# Patient Record
Sex: MALE | Race: WHITE | Employment: OTHER | URBAN - METROPOLITAN AREA
[De-identification: names, ages, dates, MRNs, and addresses within clinical notes are randomized per-mention and may not be internally consistent; named-entity substitution may affect disease eponyms.]

---

## 2023-01-09 NOTE — PROGRESS NOTES
[address]   Cardiac Consultation    Referring Provider:  Jack Richards MD     Chief Complaint   Patient presents with    New Patient    Coronary Artery Disease    Hypertension    Other     No new concerns        History of Present Illness:  Renato Keller 1940 is a previous patient of Dr. Carey Sibley and last seen on 10/10/2016. Referred from Dr. Adrianne Rosales. He has a PMH significant for CAD s/p CABG 2005 (cath 2009 noted Patent LIMA to mid-LAD, Patent SVG to RPDA, Patent SVG to RPL1, Patent SVG to OM1 in 2005), HTN, HLD, DM II, aortic stenosis, CHF, angina pectoris and bilateral Carotid stenosis  Stress Echo 7/9/2012 noted asymptomatic + ROXANA for ischemia; Negative stress echo. Lina-Myoview 6/24/2013 EF=49%; small fixed defect in the inferior wall consistent with old infarct. Carotid Duplex 8/19/2015 noted <50% in the right and left external carotid artery. He presents today with 4-pronged cane. He walks stooped over slightly. He reports that he feels good. He saw cards doc in [place] but does not recall name or place? No records to review. He describes likely AS and consideration for TAVR but was told he didn't need it. Vague historian. He has been losing weight over the past 4-5 years. He notes that he is deconditioned and feels a little weak. He notes that that his BP is usually high. He just moved back from living in [place] for 6 yrs. He notes that he previously Dr. Veto Olea when he lived in [place]. EKG today NSR 64; prolonged 1st degree AVB; anterior infarct; IVCD; ST abnl lateral leads consider ischemia. Patient with no complaints of chest pain, SOB, palpitations, dizziness, edema, or orthopnea/PND. Past Medical History:   has a past medical history of CAD (coronary artery disease), Diabetes mellitus ([address] Hypertension, and Unspecified cerebral artery occlusion with cerebral infarction.     Surgical History:   has a past surgical history that includes Coronary angioplasty with stent; Cardiac surgery (2002); hernia repair; and eye surgery (Left, 11/17/2016). Social History:   reports that he has never smoked. He has never used smokeless tobacco. He reports current alcohol use. He reports that he does not use drugs. He is single in lives in [facility]. He has a daughter lives in [place]. Never smoked or did drugs. Family History:  family history includes Diabetes in his mother. Father passed from breathing issues worked in mines  Mother passed from DM 1      Home Medications:  Prior to Admission medications    Medication Sig Start Date End Date Taking? Authorizing Provider   amLODIPine (NORVASC) 5 MG tablet TAKE 1 TABLET BY MOUTH TWICE DAILY 1/3/23  Yes Historical Provider, MD   atorvastatin (LIPITOR) 10 MG tablet TAKE 1 TABLET BY MOUTH ONCE DAILY 1/3/23  Yes Historical Provider, MD   carvedilol (COREG) 3.125 MG tablet TAKE 1 TABLET BY MOUTH TWICE DAILY WITH FOOD 1/3/23  Yes Historical Provider, MD   JANUVIA 25 MG tablet  1/4/23  Yes Historical Provider, MD   aspirin 325 MG tablet Take 325 mg by mouth daily   Yes Historical Provider, MD   zinc gluconate 50 MG tablet Take 50 mg by mouth daily   Yes Historical Provider, MD   quiNINE Sulfate POWD by Does not apply route   Yes Historical Provider, MD   Multiple Vitamins-Minerals (THERAPEUTIC MULTIVITAMIN-MINERALS) tablet Take 1 tablet by mouth daily   Yes Historical Provider, MD        Allergies:  Isosorbide nitrate and Norvasc [amlodipine]     Review of Systems:   Constitutional: there has been no unanticipated weight loss. There's been no change in energy level, sleep pattern, or activity level. Eyes: No visual changes or diplopia. No scleral icterus. ENT: No Headaches, hearing loss or vertigo. No mouth sores or sore throat. Cardiovascular: Reviewed in HPI  Respiratory: No cough or wheezing, no sputum production. No hematemesis. Gastrointestinal: No abdominal pain, appetite loss, blood in stools.  No change in bowel or bladder habits.  Genitourinary: No dysuria, trouble voiding, or hematuria.  Musculoskeletal:  No gait disturbance, weakness or joint complaints.  Integumentary: No rash or pruritis.  Neurological: No headache, diplopia, change in muscle strength, numbness or tingling. No change in gait, balance, coordination, mood, affect, memory, mentation, behavior.  Psychiatric: No anxiety, no depression.  Endocrine: No malaise, fatigue or temperature intolerance. No excessive thirst, fluid intake, or urination. No tremor.  Hematologic/Lymphatic: No abnormal bruising or bleeding, blood clots or swollen lymph nodes.  Allergic/Immunologic: No nasal congestion or hives.      Physical Examination:    Vitals:    01/12/23 1254   BP: (!) 167/70   Pulse: 74   Temp: 98.6 °F (37 °C)   SpO2: 93%        Constitutional and General Appearance: NAD   Respiratory:  Normal excursion and expansion without use of accessory muscles  Resp Auscultation: Normal breath sounds without dullness Soft breatht sounds over the left base.  Cardiovascular:  The apical impulses not displaced  Heart tones are crisp and normal  Cervical veins are not engorged  The carotid upstroke is normal in amplitude and contour without delay or bruit  Normal S1S2, No S3, 2/6 PATI over left sternal border that radiates into carotids.   Peripheral pulses are symmetrical and full  There is no clubbing, cyanosis of the extremities.  No edema  Femoral Arteries: 2+ and equal  Pedal Pulses: 2+ and equal   Abdomen:  No masses or tenderness  Liver/Spleen: No Abnormalities Noted  Neurological/Psychiatric:  Alert and oriented in all spheres  Moves all extremities well  Exhibits normal gait balance and coordination  No abnormalities of mood, affect, memory, mentation, or behavior are noted  Skin:  Skin: warm and dry.    LABS:    Lab Results   Component Value Date     (L) 03/10/2016    K 3.6 03/10/2016    CL 91 (L) 03/10/2016    CO2 29 03/10/2016    BUN 18 03/10/2016    CREATININE 1.2  03/10/2016    GLUCOSE 119 (H) 03/10/2016    CALCIUM 9.1 03/10/2016    PROT 6.5 03/10/2016    LABALBU 4.3 03/10/2016    BILITOT 0.6 03/10/2016    ALKPHOS 77 03/10/2016    AST 22 03/10/2016    ALT 18 03/10/2016    LABGLOM 59 (A) 03/10/2016    GFRAA >60 03/10/2016    AGRATIO 2.0 03/10/2016    GLOB 2.2 03/10/2016     Lab Results   Component Value Date    WBC 8.0 03/10/2016    HGB 11.8 (L) 03/10/2016    HCT 34.5 (L) 03/10/2016    MCV 91.7 03/10/2016     03/10/2016     No results found for: TSH, L0VSQPL, N5AXHUF, THYROIDAB, FT3, T4FREE  No results found for: CHOL  No results found for: TRIG  No results found for: HDL  No results found for: LDLCHOLESTEROL, LDLCALC  No results found for: LABVLDL, VLDL  No results found for: CHOLHDLRATIO  No results found for: LABA1C      Assessment:         1. Primary hypertension: Suboptimal blood pressure control and will need adjustment of antihypertensive medical regimen. Will add lisinopril 10mg qd (on 5mg prior in 2016) and continue norvasc 5mg BID, coreg 3.125mg BID,. 2. Coronary artery disease involving native coronary artery of native heart, unspecified whether angina present: S/P remote CABG and cath in 2009 with patent grafts. There are no concerning symptoms for angina currently. Continue adult aspirin (he does not want baby dose) and lipitor 10mg daily. 3. Murmur, cardiac: Likely aortic stenosis and he reports what sounds like possible TAVR consideration but ultimately told he did not need it. No concerning symptoms at this time. Reports no testing for 2-3 years. Will check ECHO. 4.     Lipids: Need to check FLP near future and adjust PRN. Continue lipitor 10mg daily for now. Plan:  Start lisinopril 10 mg daily  Check fasting labs in 1 week at [facility]  I recommend ECHO to evaluate LV function and size, wall motion, and valves for any structural abnormalities.      Plan to follow up 4 months    This note was scribed in the presence of Mendoza Archer MD by Karen Philip RN. I, Dr. Shivam Romero, personally performed the services described in this documentation, as scribed by the above signed scribe in my presence. It is both accurate and complete to my knowledge. I agree with the details independently gathered by the clinical support staff, while the remaining scribed note accurately describes my personal service to the patient. Cost of prescription medications and patient compliance have been reviewed with patient. All questions answered. Thank you for allowing me to participate in the care of this individual.    Bhavin Martínez.  Serene Eldridge M.D., [facility]

## 2023-01-12 ENCOUNTER — OFFICE VISIT (OUTPATIENT)
Dept: CARDIOLOGY CLINIC | Age: 83
End: 2023-01-12

## 2023-01-12 VITALS
SYSTOLIC BLOOD PRESSURE: 167 MMHG | DIASTOLIC BLOOD PRESSURE: 70 MMHG | HEIGHT: 62 IN | OXYGEN SATURATION: 93 % | TEMPERATURE: 98.6 F | BODY MASS INDEX: 28.34 KG/M2 | HEART RATE: 74 BPM | WEIGHT: 154 LBS

## 2023-01-12 DIAGNOSIS — I25.10 CORONARY ARTERY DISEASE INVOLVING NATIVE CORONARY ARTERY OF NATIVE HEART, UNSPECIFIED WHETHER ANGINA PRESENT: ICD-10-CM

## 2023-01-12 DIAGNOSIS — I25.10 CORONARY ARTERY DISEASE INVOLVING NATIVE HEART WITHOUT ANGINA PECTORIS, UNSPECIFIED VESSEL OR LESION TYPE: Primary | ICD-10-CM

## 2023-01-12 DIAGNOSIS — R01.1 MURMUR, CARDIAC: ICD-10-CM

## 2023-01-12 DIAGNOSIS — R01.1 CARDIAC MURMUR: ICD-10-CM

## 2023-01-12 DIAGNOSIS — I10 PRIMARY HYPERTENSION: ICD-10-CM

## 2023-01-12 RX ORDER — ASPIRIN 325 MG
325 TABLET ORAL DAILY
COMMUNITY

## 2023-01-12 RX ORDER — AMLODIPINE BESYLATE 5 MG/1
TABLET ORAL
COMMUNITY
Start: 2023-01-03

## 2023-01-12 RX ORDER — M-VIT,TX,IRON,MINS/CALC/FOLIC 27MG-0.4MG
1 TABLET ORAL DAILY
COMMUNITY

## 2023-01-12 RX ORDER — SITAGLIPTIN 25 MG/1
TABLET, FILM COATED ORAL
COMMUNITY
Start: 2023-01-04

## 2023-01-12 RX ORDER — LISINOPRIL 10 MG/1
10 TABLET ORAL DAILY
Qty: 30 TABLET | Refills: 5 | Status: SHIPPED | OUTPATIENT
Start: 2023-01-12

## 2023-01-12 RX ORDER — CARVEDILOL 3.12 MG/1
TABLET ORAL
COMMUNITY
Start: 2023-01-03

## 2023-01-12 RX ORDER — ATORVASTATIN CALCIUM 10 MG/1
TABLET, FILM COATED ORAL
COMMUNITY
Start: 2023-01-03

## 2023-01-12 RX ORDER — ZINC GLUCONATE 50 MG
50 TABLET ORAL DAILY
COMMUNITY

## 2023-01-12 NOTE — PATIENT INSTRUCTIONS
Plan:  Start lisinopril 10 mg daily  Check fasting labs in 1 week at [facility]  I recommend ECHO to evaluate LV function and size, wall motion, and valves for any structural abnormalities. Your provider has ordered testing for further evaluation. An order/prescription has been included in your paper work. To schedule outpatient testing, contact Central Scheduling by calling Shidonni ([phone]).         Plan to follow up 4 months

## 2023-01-24 ENCOUNTER — HOSPITAL ENCOUNTER (OUTPATIENT)
Age: 83
Discharge: HOME | End: 2023-01-24
Payer: MEDICARE

## 2023-01-24 ENCOUNTER — OFFICE VISIT (OUTPATIENT)
Dept: SURGERY | Age: 83
End: 2023-01-24
Payer: MEDICARE

## 2023-01-24 VITALS
WEIGHT: 160 LBS | BODY MASS INDEX: 29.44 KG/M2 | DIASTOLIC BLOOD PRESSURE: 76 MMHG | SYSTOLIC BLOOD PRESSURE: 130 MMHG | HEIGHT: 62 IN

## 2023-01-24 DIAGNOSIS — K64.4 RESIDUAL HEMORRHOIDAL SKIN TAGS: Primary | ICD-10-CM

## 2023-01-24 DIAGNOSIS — I25.10 CORONARY ARTERY DISEASE INVOLVING NATIVE HEART WITHOUT ANGINA PECTORIS, UNSPECIFIED VESSEL OR LESION TYPE: ICD-10-CM

## 2023-01-24 DIAGNOSIS — R01.1 MURMUR, CARDIAC: ICD-10-CM

## 2023-01-24 DIAGNOSIS — I10 PRIMARY HYPERTENSION: ICD-10-CM

## 2023-01-24 LAB
A/G RATIO: 1.8 (ref 1.1–2.2)
ALBUMIN SERPL-MCNC: 4.3 G/DL (ref 3.4–5)
ALP BLD-CCNC: 85 U/L (ref 40–129)
ALT SERPL-CCNC: 19 U/L (ref 10–40)
ANION GAP SERPL CALCULATED.3IONS-SCNC: 16 MMOL/L (ref 3–16)
AST SERPL-CCNC: 24 U/L (ref 15–37)
BASOPHILS ABSOLUTE: 0.1 K/UL (ref 0–0.2)
BASOPHILS RELATIVE PERCENT: 0.8 %
BILIRUB SERPL-MCNC: 0.6 MG/DL (ref 0–1)
BUN BLDV-MCNC: 31 MG/DL (ref 7–20)
CALCIUM SERPL-MCNC: 9.7 MG/DL (ref 8.3–10.6)
CHLORIDE BLD-SCNC: 104 MMOL/L (ref 99–110)
CHOLESTEROL, TOTAL: 125 MG/DL (ref 0–199)
CO2: 23 MMOL/L (ref 21–32)
CREAT SERPL-MCNC: 1.5 MG/DL (ref 0.8–1.3)
EOSINOPHILS ABSOLUTE: 0.3 K/UL (ref 0–0.6)
EOSINOPHILS RELATIVE PERCENT: 3.5 %
GFR SERPL CREATININE-BSD FRML MDRD: 45 ML/MIN/{1.73_M2}
GLUCOSE BLD-MCNC: 101 MG/DL (ref 70–99)
HCT VFR BLD CALC: 32.3 % (ref 40.5–52.5)
HDLC SERPL-MCNC: 59 MG/DL (ref 40–60)
HEMOGLOBIN: 10.9 G/DL (ref 13.5–17.5)
LDL CHOLESTEROL CALCULATED: 48 MG/DL
LYMPHOCYTES ABSOLUTE: 1.1 K/UL (ref 1–5.1)
LYMPHOCYTES RELATIVE PERCENT: 14.6 %
MCH RBC QN AUTO: 32.9 PG (ref 26–34)
MCHC RBC AUTO-ENTMCNC: 33.6 G/DL (ref 31–36)
MCV RBC AUTO: 97.9 FL (ref 80–100)
MONOCYTES ABSOLUTE: 0.8 K/UL (ref 0–1.3)
MONOCYTES RELATIVE PERCENT: 9.6 %
NEUTROPHILS ABSOLUTE: 5.6 K/UL (ref 1.7–7.7)
NEUTROPHILS RELATIVE PERCENT: 71.5 %
PDW BLD-RTO: 14 % (ref 12.4–15.4)
PLATELET # BLD: 139 K/UL (ref 135–450)
PMV BLD AUTO: 8.7 FL (ref 5–10.5)
POTASSIUM SERPL-SCNC: 4.3 MMOL/L (ref 3.5–5.1)
RBC # BLD: 3.3 M/UL (ref 4.2–5.9)
SODIUM BLD-SCNC: 143 MMOL/L (ref 136–145)
T4 FREE: 1.3 NG/DL (ref 0.9–1.8)
TOTAL PROTEIN: 6.7 G/DL (ref 6.4–8.2)
TRIGL SERPL-MCNC: 89 MG/DL (ref 0–150)
TSH REFLEX FT4: 5.39 UIU/ML (ref 0.27–4.2)
VLDLC SERPL CALC-MCNC: 18 MG/DL
WBC # BLD: 7.8 K/UL (ref 4–11)

## 2023-01-24 PROCEDURE — 84443 ASSAY THYROID STIM HORMONE: CPT

## 2023-01-24 PROCEDURE — 3075F SYST BP GE 130 - 139MM HG: CPT | Performed by: SURGERY

## 2023-01-24 PROCEDURE — G8427 DOCREV CUR MEDS BY ELIG CLIN: HCPCS | Performed by: SURGERY

## 2023-01-24 PROCEDURE — 85025 COMPLETE CBC W/AUTO DIFF WBC: CPT

## 2023-01-24 PROCEDURE — 84439 ASSAY OF FREE THYROXINE: CPT

## 2023-01-24 PROCEDURE — G8484 FLU IMMUNIZE NO ADMIN: HCPCS | Performed by: SURGERY

## 2023-01-24 PROCEDURE — 80053 COMPREHEN METABOLIC PANEL: CPT

## 2023-01-24 PROCEDURE — 99202 OFFICE O/P NEW SF 15 MIN: CPT | Performed by: SURGERY

## 2023-01-24 PROCEDURE — 1036F TOBACCO NON-USER: CPT | Performed by: SURGERY

## 2023-01-24 PROCEDURE — G8417 CALC BMI ABV UP PARAM F/U: HCPCS | Performed by: SURGERY

## 2023-01-24 PROCEDURE — 3078F DIAST BP <80 MM HG: CPT | Performed by: SURGERY

## 2023-01-24 PROCEDURE — 80061 LIPID PANEL: CPT

## 2023-01-24 PROCEDURE — 1123F ACP DISCUSS/DSCN MKR DOCD: CPT | Performed by: SURGERY

## 2023-01-24 PROCEDURE — 36415 COLL VENOUS BLD VENIPUNCTURE: CPT

## 2023-01-24 NOTE — PROGRESS NOTES
New Patient Via Joey Kilgore MD    [address], [address] [facility]  [place], [facility]  [phone]    Trinh Morris   YOB: 1940    Date of Visit:  1/24/2023      Anurag Mulligan MD    Chief Complaint: Hemorrhoid    HPI: Patient presents for evaluation of a hemorrhoid. He states he has had it for years. It does not bother him. It does not get inflamed. It does not bleed. He has no pain related to this.   He states that she just an extra piece of skin there that he thought maybe he should have removed    Allergies   Allergen Reactions    Isosorbide Nitrate Other (See Comments)     Headache    Norvasc [Amlodipine] Rash     Outpatient Medications Marked as Taking for the 1/24/23 encounter (Office Visit) with Chuck Fong MD   Medication Sig Dispense Refill    amLODIPine (NORVASC) 5 MG tablet TAKE 1 TABLET BY MOUTH TWICE DAILY      atorvastatin (LIPITOR) 10 MG tablet TAKE 1 TABLET BY MOUTH ONCE DAILY      carvedilol (COREG) 3.125 MG tablet TAKE 1 TABLET BY MOUTH TWICE DAILY WITH FOOD      JANUVIA 25 MG tablet       aspirin 325 MG tablet Take 325 mg by mouth daily      zinc gluconate 50 MG tablet Take 50 mg by mouth daily      quiNINE Sulfate POWD by Does not apply route      Multiple Vitamins-Minerals (THERAPEUTIC MULTIVITAMIN-MINERALS) tablet Take 1 tablet by mouth daily      lisinopril (PRINIVIL;ZESTRIL) 10 MG tablet Take 1 tablet by mouth daily 30 tablet 5       Past Medical History:   Diagnosis Date    CAD (coronary artery disease)     Diabetes mellitus ([address]     Hypertension     Unspecified cerebral artery occlusion with cerebral infarction      Past Surgical History:   Procedure Laterality Date    CARDIAC SURGERY  2002    5 vessel bypass    CORONARY ANGIOPLASTY WITH STENT PLACEMENT      EYE SURGERY Left 11/17/2016    cataract removal with lens implant    HERNIA REPAIR       Family History   Problem Relation Age of Onset Diabetes Mother      Social History     Socioeconomic History    Marital status:      Spouse name: Not on file    Number of children: Not on file    Years of education: Not on file    Highest education level: Not on file   Occupational History    Not on file   Tobacco Use    Smoking status: Never    Smokeless tobacco: Never   Substance and Sexual Activity    Alcohol use: Yes     Comment: 1 x year    Drug use: No    Sexual activity: Not on file   Other Topics Concern    Not on file   Social History Narrative    Not on file     Social Determinants of Health     Financial Resource Strain: Not on file   Food Insecurity: Not on file   Transportation Needs: Not on file   Physical Activity: Not on file   Stress: Not on file   Social Connections: Not on file   Intimate Partner Violence: Not on file   Housing Stability: Not on file          Vitals:    01/24/23 1027   BP: 130/76   Site: Left Upper Arm   Position: Sitting   Cuff Size: Large Adult   Weight: 160 lb (72.6 kg)   Height: 5' 2\" (1.575 m)     Body mass index is 29.26 kg/m². Wt Readings from Last 3 Encounters:   01/24/23 160 lb (72.6 kg)   01/12/23 154 lb (69.9 kg)   11/14/16 175 lb (79.4 kg)     BP Readings from Last 3 Encounters:   01/24/23 130/76   01/12/23 (!) 167/70   11/17/16 (!) 196/83        REVIEW OF SYSTEMS:  CONSTITUTIONAL:  negative  HEENT:  Negative  RESPIRATORY:  negative  CARDIOVASCULAR:  negative  GASTROINTESTINAL:  negative  GENITOURINARY:  negative  HEMATOLOGIC/LYMPHATIC:  negative  ENDOCRINE:  Negative  NEUROLOGICAL:  Negative  * All other ROS reviewed and negative.      PE:  Constitutional:  Well developed, well nourished, no acute distress, non-toxic appearance   Eyes:  PERRL, conjunctiva normal   HENT:  Atraumatic, external ears normal, nose normal. Neck- normal range of motion, no tenderness, supple   Respiratory:  No respiratory distress, normal breath sounds, no rales, no wheezing   Cardiovascular:  Normal rate, normal rhythm  GI: Bowel sounds positive, soft, nontender. On perianal exam he has an exterior hemorrhoidal skin tag that is not inflamed  :  No costovertebral angle tenderness   Integument:  Well hydrated, no rash   Lymphatic:  No lymphadenopathy noted   Neurologic:  Alert & oriented x 3, no focal deficits noted   Psychiatric:  Speech and behavior appropriate       DATA:  N/A      Assessment:  1. Residual hemorrhoidal skin tags        Plan: We discussed excision of the hemorrhoidal skin tag. I explained that it is much more than a simple snip that he thought it would entail. If it is not bothering him, I do not recommend surgery for this.   He will continue his routine care and follow-up as needed

## 2023-01-26 ENCOUNTER — TELEPHONE (OUTPATIENT)
Dept: CARDIOLOGY CLINIC | Age: 83
End: 2023-01-26

## 2023-01-26 PROBLEM — H90.2 CONDUCTIVE HEARING LOSS: Status: ACTIVE | Noted: 2023-01-26

## 2023-01-26 PROBLEM — N40.0 ENLARGED PROSTATE: Status: ACTIVE | Noted: 2023-01-26

## 2023-01-26 NOTE — TELEPHONE ENCOUNTER
----- Message from Zaria Mercado MD sent at 1/25/2023  5:14 PM EST -----  Blood test shows he is slightly dehydrated drink more fluids  He is anemic slightly please have him see Dr Ruth Ann Ashley his PCP re that.

## 2023-01-26 NOTE — TELEPHONE ENCOUNTER
Patient informed and VU. CBC routed to pcp. Patient gave me information of [facility] in [address]  [phone] to obtain past medical records.

## 2023-01-27 ENCOUNTER — TELEPHONE (OUTPATIENT)
Dept: CARDIOLOGY CLINIC | Age: 83
End: 2023-01-27

## 2023-01-27 NOTE — TELEPHONE ENCOUNTER
----- Message from Joseph Mohr MD sent at 1/26/2023  2:11 PM EST -----  We'll contact him. Iron studies are normal. He has anemia of chronic diease, likely from chronic renal insufficiency.  Am going to refer to Dr. Camila Stanley    ----- Message -----  From: Pablo Winkler RN  Sent: 1/26/2023  11:40 AM EST  To: Joseph Mohr MD

## 2023-02-21 ENCOUNTER — HOSPITAL ENCOUNTER (OUTPATIENT)
Dept: NON INVASIVE DIAGNOSTICS | Age: 83
Discharge: HOME | End: 2023-02-21
Payer: MEDICARE

## 2023-02-21 DIAGNOSIS — I25.10 CORONARY ARTERY DISEASE INVOLVING NATIVE HEART WITHOUT ANGINA PECTORIS, UNSPECIFIED VESSEL OR LESION TYPE: ICD-10-CM

## 2023-02-21 DIAGNOSIS — R01.1 MURMUR, CARDIAC: ICD-10-CM

## 2023-02-21 DIAGNOSIS — I10 PRIMARY HYPERTENSION: ICD-10-CM

## 2023-02-21 LAB
LV EF: 63 %
LVEF MODALITY: NORMAL

## 2023-02-21 PROCEDURE — 93306 TTE W/DOPPLER COMPLETE: CPT

## 2023-02-22 ENCOUNTER — TELEPHONE (OUTPATIENT)
Dept: CARDIOLOGY CLINIC | Age: 83
End: 2023-02-22

## 2023-02-22 NOTE — TELEPHONE ENCOUNTER
Pt returned call and is requesting returned call from [facility] to discuss.  Pt wondering if smm could set up a time for pt to call to speak to him due to pt not answering unknown numbers, please advise

## 2023-02-22 NOTE — TELEPHONE ENCOUNTER
----- Message from Frances Garcia MD sent at 2/21/2023  7:28 PM EST -----  Please let Mr. Parveen Devlinws know that I tried to call him but no answer and did not want to just leave . His ECHO shows severe aortic stenosis and regurgitation. Valve is severely tight and leaky and significantly diseased. I recommend TAVR and would refer to TAVR clinic at [facility] if he is willing. Let me know and I am glad to talk in person if he wants. Just let me know again. Thanks.

## 2023-02-23 NOTE — TELEPHONE ENCOUNTER
02/23-Called ([phone]) spoke to pt, pt scheduled for 03/14/2023 at 9:30 am w/DCE at [facility]. Pt has multiple questions I think someone from medical staff needs to intervene & help explain what this appointment is truly for. Pt is very confused.

## 2023-02-24 ENCOUNTER — TELEPHONE (OUTPATIENT)
Dept: CARDIOLOGY CLINIC | Age: 83
End: 2023-02-24

## 2023-02-24 NOTE — TELEPHONE ENCOUNTER
Pt has already been scheduled for 03/14/2023 at 9:30 am at [facility] w/DCE

## 2023-02-24 NOTE — TELEPHONE ENCOUNTER
I called and went over the ECHO results and explained TAVR to him. Please call him and make appt for TAVR clinic at [facility]. He is older and will need explaining of things but I believe he can ultimately understand. He is willing to do TAVR. Please keep me informed of when and by whom he will be seen. Thanks.

## 2023-02-24 NOTE — TELEPHONE ENCOUNTER
Fernando Mccall    9:34 AM  Note    2/24 Pt called Angela Ohara regarding recent ECHO that was performed. He was informed that he has to have a heart valve replaced and he stated that he is scared to death. Pt would like to speak with SMM personally to ask questions and to state his concerns about the next steps. Dr. Lilia Clemons, I spoke with him and answered some of his questions,  but I told him that you would call him to help ease his worries and better explain the procedure and results to him.

## 2023-02-24 NOTE — TELEPHONE ENCOUNTER
2/24 Pt called [phone] regarding recent ECHO that was performed. He was informed that he has to have a heart valve replaced and he stated that he is scared to death. Pt would like to speak with SMM personally to ask questions and to state his concerns about the next steps.

## 2023-03-13 NOTE — PROGRESS NOTES
[address]  H+P  Consult  OP Visit  FU Visit   CC/HPI   CC Followup visit for cardiac conditions detailed in assessment and plan below. Intervention CABG   General Doing fair. Concerned with worsening symptoms last few months. Cardiac Sx -CP, -syncope, +SOB, +dizziness, +edema, +orthopnea, +pnd   HISTORY/ALLERGY/ROS   MEDHx  has a past medical history of CAD (coronary artery disease), Diabetes mellitus ([address] Hypertension, and Unspecified cerebral artery occlusion with cerebral infarction. SURGHx  has a past surgical history that includes Coronary angioplasty with stent; Cardiac surgery (2002); hernia repair; and eye surgery (Left, 11/17/2016). SOCHx  reports that he has never smoked. He has never used smokeless tobacco. He reports current alcohol use. He reports that he does not use drugs. FAMHx family history includes Diabetes in his mother. ALLERG Isosorbide nitrate and Norvasc [amlodipine]   ROS Full ROS obtained and negative except as mentioned in HPI   MEDICATIONS   Current Outpatient Medications   Medication Sig Dispense Refill    amLODIPine (NORVASC) 5 MG tablet TAKE 1 TABLET BY MOUTH TWICE DAILY      atorvastatin (LIPITOR) 10 MG tablet TAKE 1 TABLET BY MOUTH ONCE DAILY      carvedilol (COREG) 3.125 MG tablet TAKE 1 TABLET BY MOUTH TWICE DAILY WITH FOOD      JANUVIA 25 MG tablet       aspirin 325 MG tablet Take 325 mg by mouth daily      zinc gluconate 50 MG tablet Take 50 mg by mouth daily      quiNINE Sulfate POWD by Does not apply route      Multiple Vitamins-Minerals (THERAPEUTIC MULTIVITAMIN-MINERALS) tablet Take 1 tablet by mouth daily      lisinopril (PRINIVIL;ZESTRIL) 10 MG tablet Take 1 tablet by mouth daily 30 tablet 5     No current facility-administered medications for this visit.       PHYSICAL EXAM   Vitals BP (!) 166/84   Pulse 81   Ht 5' 2\" (1.575 m)   Wt 162 lb (73.5 kg)   SpO2 92%   BMI 29.63 kg/m²    Gen Alert, coop, no distress Heart  RRR, 3/6   Head NC, AT, no abnorm Abd  Soft, NT, +BS, no mass, no OM   Eyes PER, conj/corn clear Ext  Ext nl, AT, no C/C/E   Nose Nares nl, no drain, NT Pulse Decr bilat   Throat Lips, mucosa, tongue nl Skin Col/text/turg nl, no vis rash/les   Neck S/S, TM, NT, no bruit/JVD Psych Nl mood and affect   Lung Basilar rales bilat Lymph   No cervical or axillary LA   Ch wall NT, no deform Neuro  Nl gross M/S exam      COMPLIANCE   Discussed and counseled on diet, exercise, weight loss, smoking, alcohol, drugs. All questions answered. CODING   SCI (77137) - 30-39 mins spent reviewing hx/tests/consults, performing exam, counseling/educating, ordering meds/tests/procedures, referring/communicating w/PCP/consultants, documenting in EMR, interpreting results, communicating medical information and plan with family. SCRIBE ATTESTATION   Nurse Scribe Attestation  Opal Kelly am scribing for and in the presence of Payton Harrison MD.   Signed, Kait Holbrook RN. 3/13/2023 3:25 PM    Doctor Kait Holbrook is working as a scribe for and in the presence of randee Harrison MD). Working as a scribe, Kait Holbrook may have prepopulated components of this note with my historical  intellectual property under my direct supervision. Any additions to this intellectual property were performed in my presence and at my direction. Furthermore, the content and accuracy of this note have been reviewed by randee Harrison MD). ASSESSMENT AND PLAN   *AS    Date EF Detail   Sx     Onset: Onset: recent  Duration: months  Temporal: Worsening   CHF Type   Acute on chronic diastolic   NYHA   III   FRAME   CHF Dx (2 Major or 1 Major+2 Minor)  Major: PND/Orthop  Minor: PAMELA, IZQUIERDO   CHF Meds   ACE/ARB/ARNI, ASA, CCB, STATIN   TTE 2/23  65% AS MG 57, Mod-severe AI   Plan     Comprehensive discussion regarding options including Med v TAVR v SAVR  AVR workup including but not limited to:   Angiography - LHC (R/B/O discussed)  Imaging - CTA C/A/P (Risk MADELINE discussed), Carotid US  Consultations - CT Surgery and Dentist  Further recommendations regarding AVR variety pending diagnostic testing  Appreciate referral from Dr. Alvarado Rm    *CAD   Date EF Results   Sx   As above   Hx 2005  CABG   [facility] 2009  Patient LIMA-mid LAD, SVG-RPDA, SVG-RPL1, SVG-OM1,   MPI 7/12 6/13   49% (STTE) negative  Fixed defect c/w scar   Plan   Continue aggressive medical treatment at doses above   *HTN  Status Controlled   Plan Continue current antihypertensives at doses above   *FOLLOWUP  Pending testing

## 2023-03-14 ENCOUNTER — TELEPHONE (OUTPATIENT)
Dept: CARDIOLOGY CLINIC | Age: 83
End: 2023-03-14

## 2023-03-14 ENCOUNTER — OFFICE VISIT (OUTPATIENT)
Dept: CARDIOLOGY CLINIC | Age: 83
End: 2023-03-14
Payer: MEDICARE

## 2023-03-14 VITALS
HEART RATE: 81 BPM | HEIGHT: 62 IN | WEIGHT: 162 LBS | SYSTOLIC BLOOD PRESSURE: 166 MMHG | BODY MASS INDEX: 29.81 KG/M2 | OXYGEN SATURATION: 92 % | DIASTOLIC BLOOD PRESSURE: 84 MMHG

## 2023-03-14 DIAGNOSIS — R42 DIZZINESS: ICD-10-CM

## 2023-03-14 DIAGNOSIS — I10 ESSENTIAL HYPERTENSION: ICD-10-CM

## 2023-03-14 DIAGNOSIS — I25.83 CORONARY ARTERY DISEASE DUE TO LIPID RICH PLAQUE: ICD-10-CM

## 2023-03-14 DIAGNOSIS — I25.10 CORONARY ARTERY DISEASE DUE TO LIPID RICH PLAQUE: ICD-10-CM

## 2023-03-14 DIAGNOSIS — I35.0 AORTIC VALVE STENOSIS, NONRHEUMATIC: Primary | ICD-10-CM

## 2023-03-14 PROCEDURE — G8484 FLU IMMUNIZE NO ADMIN: HCPCS | Performed by: INTERNAL MEDICINE

## 2023-03-14 PROCEDURE — G8417 CALC BMI ABV UP PARAM F/U: HCPCS | Performed by: INTERNAL MEDICINE

## 2023-03-14 PROCEDURE — 3077F SYST BP >= 140 MM HG: CPT | Performed by: INTERNAL MEDICINE

## 2023-03-14 PROCEDURE — G8427 DOCREV CUR MEDS BY ELIG CLIN: HCPCS | Performed by: INTERNAL MEDICINE

## 2023-03-14 PROCEDURE — 3079F DIAST BP 80-89 MM HG: CPT | Performed by: INTERNAL MEDICINE

## 2023-03-14 PROCEDURE — 1036F TOBACCO NON-USER: CPT | Performed by: INTERNAL MEDICINE

## 2023-03-14 PROCEDURE — 1123F ACP DISCUSS/DSCN MKR DOCD: CPT | Performed by: INTERNAL MEDICINE

## 2023-03-14 PROCEDURE — 99214 OFFICE O/P EST MOD 30 MIN: CPT | Performed by: INTERNAL MEDICINE

## 2023-03-14 NOTE — TELEPHONE ENCOUNTER
Paras Rawls, can you schedule Mr. Dorian Trent for a cath with whoever Dr. Candice Bernal prefers. He has no dye allergy, on no blood thinners. Frances, can you schedule him for TAVR CTA chest/abd/pelvis and carotid US at [facility] about a week before of after cath? He has no port and lives at home. Labs ordered.      Thank you, Kimberly CARO

## 2023-03-27 NOTE — TELEPHONE ENCOUNTER
Spoke with patient. Patient is scheduled with Dr. Shivam Wynne for Left Heart Cath on 23 at [facility], arrival time of 9:30am to the Cath Lab. Please have patient arrive to the main entrance of [facility] and check in with the registration desk. Please call patient regarding medication instructions. Remind patient to be NPO after midnight (8 hours prior). Do not apply lotions/creams on skin the day of procedure.     Cardiac Testin/20/23 (arrive @ 12:30pm) to [facility] Main Entrance ([address] [facility])  1:30pm CTA Chest, Abdomen and Pelvis  2pm Carotid Ultrasound  You will have lab work 1st  Do not eat or drink 4 hours prior  No caffeine 12 hours prior  Bring a list of medications

## 2023-03-29 ENCOUNTER — TELEPHONE (OUTPATIENT)
Dept: CARDIOTHORACIC SURGERY | Age: 83
End: 2023-03-29

## 2023-03-30 ENCOUNTER — TELEPHONE (OUTPATIENT)
Dept: CARDIOTHORACIC SURGERY | Age: 83
End: 2023-03-30

## 2023-03-30 NOTE — TELEPHONE ENCOUNTER
Spoke with patient regarding schedule of TAVR consult on 4/27/2023 at 11:00 am with Dr. Cleo Kulkarni.  I have e-mailed this information to the patient at [email or web address].

## 2023-04-08 ENCOUNTER — HOSPITAL ENCOUNTER (INPATIENT)
Age: 83
LOS: 6 days | Discharge: HOME | DRG: 291 | End: 2023-04-14
Attending: STUDENT IN AN ORGANIZED HEALTH CARE EDUCATION/TRAINING PROGRAM | Admitting: INTERNAL MEDICINE
Payer: MEDICARE

## 2023-04-08 ENCOUNTER — APPOINTMENT (OUTPATIENT)
Dept: GENERAL RADIOLOGY | Age: 83
DRG: 291 | End: 2023-04-08
Payer: MEDICARE

## 2023-04-08 DIAGNOSIS — R09.02 HYPOXIA: Primary | ICD-10-CM

## 2023-04-08 DIAGNOSIS — R09.89 PULMONARY VASCULAR CONGESTION: ICD-10-CM

## 2023-04-08 DIAGNOSIS — J90 PLEURAL EFFUSION: ICD-10-CM

## 2023-04-08 DIAGNOSIS — R77.8 ELEVATED TROPONIN: ICD-10-CM

## 2023-04-08 PROBLEM — I50.1 PULMONARY EDEMA CARDIAC CAUSE (HCC): Status: ACTIVE | Noted: 2023-04-08

## 2023-04-08 LAB
ALBUMIN SERPL-MCNC: 4.1 G/DL (ref 3.4–5)
ALBUMIN/GLOB SERPL: 1.8 {RATIO} (ref 1.1–2.2)
ALP SERPL-CCNC: 103 U/L (ref 40–129)
ALT SERPL-CCNC: 42 U/L (ref 10–40)
ANION GAP SERPL CALCULATED.3IONS-SCNC: 12 MMOL/L (ref 3–16)
ANION GAP SERPL CALCULATED.3IONS-SCNC: 13 MMOL/L (ref 3–16)
AST SERPL-CCNC: 32 U/L (ref 15–37)
BASE EXCESS BLDV CALC-SCNC: -3.3 MMOL/L (ref -3–3)
BASOPHILS # BLD: 0.1 K/UL (ref 0–0.2)
BASOPHILS NFR BLD: 0.5 %
BILIRUB SERPL-MCNC: 1 MG/DL (ref 0–1)
BUN SERPL-MCNC: 27 MG/DL (ref 7–20)
BUN SERPL-MCNC: 29 MG/DL (ref 7–20)
CALCIUM SERPL-MCNC: 8.7 MG/DL (ref 8.3–10.6)
CALCIUM SERPL-MCNC: 8.8 MG/DL (ref 8.3–10.6)
CHLORIDE SERPL-SCNC: 88 MMOL/L (ref 99–110)
CHLORIDE SERPL-SCNC: 88 MMOL/L (ref 99–110)
CO2 BLDV-SCNC: 23 MMOL/L
CO2 SERPL-SCNC: 22 MMOL/L (ref 21–32)
CO2 SERPL-SCNC: 25 MMOL/L (ref 21–32)
COHGB MFR BLDV: 1.7 % (ref 0–1.5)
CREAT SERPL-MCNC: 1.3 MG/DL (ref 0.8–1.3)
CREAT SERPL-MCNC: 1.3 MG/DL (ref 0.8–1.3)
DEPRECATED RDW RBC AUTO: 14.3 % (ref 12.4–15.4)
EKG ATRIAL RATE: 58 BPM
EKG DIAGNOSIS: NORMAL
EKG Q-T INTERVAL: 456 MS
EKG QRS DURATION: 148 MS
EKG QTC CALCULATION (BAZETT): 509 MS
EKG R AXIS: -21 DEGREES
EKG T AXIS: 138 DEGREES
EKG VENTRICULAR RATE: 75 BPM
EOSINOPHIL # BLD: 0 K/UL (ref 0–0.6)
EOSINOPHIL NFR BLD: 0.4 %
FLUAV RNA RESP QL NAA+PROBE: NOT DETECTED
FLUBV RNA RESP QL NAA+PROBE: NOT DETECTED
GFR SERPLBLD CREATININE-BSD FMLA CKD-EPI: 54 ML/MIN/{1.73_M2}
GFR SERPLBLD CREATININE-BSD FMLA CKD-EPI: 54 ML/MIN/{1.73_M2}
GLUCOSE BLD-MCNC: 135 MG/DL (ref 70–99)
GLUCOSE BLD-MCNC: 194 MG/DL (ref 70–99)
GLUCOSE SERPL-MCNC: 148 MG/DL (ref 70–99)
GLUCOSE SERPL-MCNC: 155 MG/DL (ref 70–99)
HCO3 BLDV-SCNC: 22.1 MMOL/L (ref 23–29)
HCT VFR BLD AUTO: 32.5 % (ref 40.5–52.5)
HGB BLD-MCNC: 11.2 G/DL (ref 13.5–17.5)
LACTATE BLDV-SCNC: 1.5 MMOL/L (ref 0.4–1.9)
LYMPHOCYTES # BLD: 1.3 K/UL (ref 1–5.1)
LYMPHOCYTES NFR BLD: 11.4 %
MCH RBC QN AUTO: 32.9 PG (ref 26–34)
MCHC RBC AUTO-ENTMCNC: 34.3 G/DL (ref 31–36)
MCV RBC AUTO: 95.9 FL (ref 80–100)
METHGB MFR BLDV: 0.7 %
MONOCYTES # BLD: 1 K/UL (ref 0–1.3)
MONOCYTES NFR BLD: 8.2 %
NEUTROPHILS # BLD: 9.4 K/UL (ref 1.7–7.7)
NEUTROPHILS NFR BLD: 79.5 %
NT-PROBNP SERPL-MCNC: ABNORMAL PG/ML (ref 0–449)
O2 THERAPY: ABNORMAL
PCO2 BLDV: 41.3 MMHG (ref 40–50)
PERFORMED ON: ABNORMAL
PERFORMED ON: ABNORMAL
PH BLDV: 7.35 [PH] (ref 7.35–7.45)
PLATELET # BLD AUTO: 286 K/UL (ref 135–450)
PMV BLD AUTO: 6.9 FL (ref 5–10.5)
PO2 BLDV: 39.1 MMHG (ref 25–40)
POTASSIUM SERPL-SCNC: 3.6 MMOL/L (ref 3.5–5.1)
POTASSIUM SERPL-SCNC: 4 MMOL/L (ref 3.5–5.1)
PROCALCITONIN SERPL IA-MCNC: 0.1 NG/ML (ref 0–0.15)
PROT SERPL-MCNC: 6.4 G/DL (ref 6.4–8.2)
RBC # BLD AUTO: 3.39 M/UL (ref 4.2–5.9)
SAO2 % BLDV: 68 %
SARS-COV-2 RNA RESP QL NAA+PROBE: NOT DETECTED
SODIUM SERPL-SCNC: 122 MMOL/L (ref 136–145)
SODIUM SERPL-SCNC: 126 MMOL/L (ref 136–145)
SPECIMEN STATUS: NORMAL
TROPONIN T SERPL-MCNC: 0.12 NG/ML
TROPONIN T SERPL-MCNC: 0.12 NG/ML
TROPONIN T SERPL-MCNC: 0.13 NG/ML
TROPONIN T SERPL-MCNC: 0.14 NG/ML
WBC # BLD AUTO: 11.8 K/UL (ref 4–11)

## 2023-04-08 PROCEDURE — 83036 HEMOGLOBIN GLYCOSYLATED A1C: CPT

## 2023-04-08 PROCEDURE — 84145 PROCALCITONIN (PCT): CPT

## 2023-04-08 PROCEDURE — 2580000003 HC RX 258: Performed by: INTERNAL MEDICINE

## 2023-04-08 PROCEDURE — 84484 ASSAY OF TROPONIN QUANT: CPT

## 2023-04-08 PROCEDURE — 80053 COMPREHEN METABOLIC PANEL: CPT

## 2023-04-08 PROCEDURE — 93005 ELECTROCARDIOGRAM TRACING: CPT | Performed by: STUDENT IN AN ORGANIZED HEALTH CARE EDUCATION/TRAINING PROGRAM

## 2023-04-08 PROCEDURE — 85025 COMPLETE CBC W/AUTO DIFF WBC: CPT

## 2023-04-08 PROCEDURE — 94761 N-INVAS EAR/PLS OXIMETRY MLT: CPT

## 2023-04-08 PROCEDURE — 6370000000 HC RX 637 (ALT 250 FOR IP): Performed by: STUDENT IN AN ORGANIZED HEALTH CARE EDUCATION/TRAINING PROGRAM

## 2023-04-08 PROCEDURE — 6360000002 HC RX W HCPCS: Performed by: INTERNAL MEDICINE

## 2023-04-08 PROCEDURE — 83880 ASSAY OF NATRIURETIC PEPTIDE: CPT

## 2023-04-08 PROCEDURE — 83605 ASSAY OF LACTIC ACID: CPT

## 2023-04-08 PROCEDURE — 99285 EMERGENCY DEPT VISIT HI MDM: CPT

## 2023-04-08 PROCEDURE — 82803 BLOOD GASES ANY COMBINATION: CPT

## 2023-04-08 PROCEDURE — 87636 SARSCOV2 & INF A&B AMP PRB: CPT

## 2023-04-08 PROCEDURE — 96374 THER/PROPH/DIAG INJ IV PUSH: CPT

## 2023-04-08 PROCEDURE — 2700000000 HC OXYGEN THERAPY PER DAY

## 2023-04-08 PROCEDURE — 80061 LIPID PANEL: CPT

## 2023-04-08 PROCEDURE — 36415 COLL VENOUS BLD VENIPUNCTURE: CPT

## 2023-04-08 PROCEDURE — 71045 X-RAY EXAM CHEST 1 VIEW: CPT

## 2023-04-08 PROCEDURE — 6360000002 HC RX W HCPCS

## 2023-04-08 PROCEDURE — 87040 BLOOD CULTURE FOR BACTERIA: CPT

## 2023-04-08 PROCEDURE — 6370000000 HC RX 637 (ALT 250 FOR IP): Performed by: INTERNAL MEDICINE

## 2023-04-08 PROCEDURE — 93010 ELECTROCARDIOGRAM REPORT: CPT | Performed by: INTERNAL MEDICINE

## 2023-04-08 PROCEDURE — 83540 ASSAY OF IRON: CPT

## 2023-04-08 PROCEDURE — 83550 IRON BINDING TEST: CPT

## 2023-04-08 PROCEDURE — 84443 ASSAY THYROID STIM HORMONE: CPT

## 2023-04-08 PROCEDURE — 1200000000 HC SEMI PRIVATE

## 2023-04-08 RX ORDER — SODIUM CHLORIDE 0.9 % (FLUSH) 0.9 %
5-40 SYRINGE (ML) INJECTION PRN
Status: DISCONTINUED | OUTPATIENT
Start: 2023-04-08 | End: 2023-04-14 | Stop reason: HOSPADM

## 2023-04-08 RX ORDER — POTASSIUM CHLORIDE 7.45 MG/ML
10 INJECTION INTRAVENOUS PRN
Status: DISCONTINUED | OUTPATIENT
Start: 2023-04-08 | End: 2023-04-14 | Stop reason: HOSPADM

## 2023-04-08 RX ORDER — SODIUM CHLORIDE 0.9 % (FLUSH) 0.9 %
5-40 SYRINGE (ML) INJECTION EVERY 12 HOURS SCHEDULED
Status: DISCONTINUED | OUTPATIENT
Start: 2023-04-08 | End: 2023-04-14 | Stop reason: HOSPADM

## 2023-04-08 RX ORDER — ACETAMINOPHEN 650 MG/1
650 SUPPOSITORY RECTAL EVERY 6 HOURS PRN
Status: DISCONTINUED | OUTPATIENT
Start: 2023-04-08 | End: 2023-04-14 | Stop reason: HOSPADM

## 2023-04-08 RX ORDER — INSULIN LISPRO 100 [IU]/ML
0-4 INJECTION, SOLUTION INTRAVENOUS; SUBCUTANEOUS NIGHTLY
Status: DISCONTINUED | OUTPATIENT
Start: 2023-04-08 | End: 2023-04-14 | Stop reason: HOSPADM

## 2023-04-08 RX ORDER — POLYETHYLENE GLYCOL 3350 17 G/17G
17 POWDER, FOR SOLUTION ORAL DAILY PRN
Status: DISCONTINUED | OUTPATIENT
Start: 2023-04-08 | End: 2023-04-14 | Stop reason: HOSPADM

## 2023-04-08 RX ORDER — LISINOPRIL 10 MG/1
10 TABLET ORAL DAILY
Status: DISCONTINUED | OUTPATIENT
Start: 2023-04-08 | End: 2023-04-14 | Stop reason: HOSPADM

## 2023-04-08 RX ORDER — SODIUM CHLORIDE 9 MG/ML
INJECTION, SOLUTION INTRAVENOUS PRN
Status: DISCONTINUED | OUTPATIENT
Start: 2023-04-08 | End: 2023-04-14 | Stop reason: HOSPADM

## 2023-04-08 RX ORDER — DEXTROSE MONOHYDRATE 100 MG/ML
INJECTION, SOLUTION INTRAVENOUS CONTINUOUS PRN
Status: DISCONTINUED | OUTPATIENT
Start: 2023-04-08 | End: 2023-04-14 | Stop reason: HOSPADM

## 2023-04-08 RX ORDER — ALOGLIPTIN 6.25 MG/1
6.25 TABLET, FILM COATED ORAL DAILY
Status: DISCONTINUED | OUTPATIENT
Start: 2023-04-08 | End: 2023-04-14 | Stop reason: HOSPADM

## 2023-04-08 RX ORDER — POTASSIUM CHLORIDE 20 MEQ/1
40 TABLET, EXTENDED RELEASE ORAL PRN
Status: DISCONTINUED | OUTPATIENT
Start: 2023-04-08 | End: 2023-04-14 | Stop reason: HOSPADM

## 2023-04-08 RX ORDER — AMLODIPINE BESYLATE 5 MG/1
5 TABLET ORAL 2 TIMES DAILY
Status: DISCONTINUED | OUTPATIENT
Start: 2023-04-08 | End: 2023-04-14 | Stop reason: HOSPADM

## 2023-04-08 RX ORDER — MAGNESIUM SULFATE IN WATER 40 MG/ML
2000 INJECTION, SOLUTION INTRAVENOUS PRN
Status: DISCONTINUED | OUTPATIENT
Start: 2023-04-08 | End: 2023-04-14 | Stop reason: HOSPADM

## 2023-04-08 RX ORDER — ATORVASTATIN CALCIUM 10 MG/1
10 TABLET, FILM COATED ORAL DAILY
Status: DISCONTINUED | OUTPATIENT
Start: 2023-04-08 | End: 2023-04-14 | Stop reason: HOSPADM

## 2023-04-08 RX ORDER — FUROSEMIDE 10 MG/ML
40 INJECTION INTRAMUSCULAR; INTRAVENOUS 2 TIMES DAILY
Status: DISCONTINUED | OUTPATIENT
Start: 2023-04-08 | End: 2023-04-09

## 2023-04-08 RX ORDER — FUROSEMIDE 10 MG/ML
20 INJECTION INTRAMUSCULAR; INTRAVENOUS ONCE
Status: COMPLETED | OUTPATIENT
Start: 2023-04-08 | End: 2023-04-08

## 2023-04-08 RX ORDER — INSULIN LISPRO 100 [IU]/ML
0-8 INJECTION, SOLUTION INTRAVENOUS; SUBCUTANEOUS
Status: DISCONTINUED | OUTPATIENT
Start: 2023-04-08 | End: 2023-04-14 | Stop reason: HOSPADM

## 2023-04-08 RX ORDER — ENOXAPARIN SODIUM 100 MG/ML
40 INJECTION SUBCUTANEOUS DAILY
Status: DISCONTINUED | OUTPATIENT
Start: 2023-04-08 | End: 2023-04-09

## 2023-04-08 RX ORDER — CARVEDILOL 3.12 MG/1
3.12 TABLET ORAL 2 TIMES DAILY WITH MEALS
Status: DISCONTINUED | OUTPATIENT
Start: 2023-04-08 | End: 2023-04-14 | Stop reason: HOSPADM

## 2023-04-08 RX ORDER — PROCHLORPERAZINE EDISYLATE 5 MG/ML
10 INJECTION INTRAMUSCULAR; INTRAVENOUS EVERY 6 HOURS PRN
Status: DISCONTINUED | OUTPATIENT
Start: 2023-04-08 | End: 2023-04-14 | Stop reason: HOSPADM

## 2023-04-08 RX ORDER — ASPIRIN 325 MG
325 TABLET ORAL DAILY
Status: DISCONTINUED | OUTPATIENT
Start: 2023-04-08 | End: 2023-04-09

## 2023-04-08 RX ORDER — ACETAMINOPHEN 325 MG/1
650 TABLET ORAL EVERY 6 HOURS PRN
Status: DISCONTINUED | OUTPATIENT
Start: 2023-04-08 | End: 2023-04-14 | Stop reason: HOSPADM

## 2023-04-08 RX ORDER — IPRATROPIUM BROMIDE AND ALBUTEROL SULFATE 2.5; .5 MG/3ML; MG/3ML
1 SOLUTION RESPIRATORY (INHALATION) ONCE
Status: COMPLETED | OUTPATIENT
Start: 2023-04-08 | End: 2023-04-08

## 2023-04-08 RX ADMIN — CARVEDILOL 3.12 MG: 3.12 TABLET, FILM COATED ORAL at 16:43

## 2023-04-08 RX ADMIN — ASPIRIN 325 MG: 325 TABLET ORAL at 16:43

## 2023-04-08 RX ADMIN — AMLODIPINE BESYLATE 5 MG: 5 TABLET ORAL at 21:14

## 2023-04-08 RX ADMIN — ATORVASTATIN CALCIUM 10 MG: 10 TABLET, FILM COATED ORAL at 16:43

## 2023-04-08 RX ADMIN — FUROSEMIDE 40 MG: 10 INJECTION, SOLUTION INTRAMUSCULAR; INTRAVENOUS at 18:08

## 2023-04-08 RX ADMIN — Medication 10 ML: at 21:15

## 2023-04-08 RX ADMIN — ALOGLIPTIN 6.25 MG: 6.25 TABLET, FILM COATED ORAL at 18:08

## 2023-04-08 RX ADMIN — LISINOPRIL 10 MG: 10 TABLET ORAL at 16:43

## 2023-04-08 RX ADMIN — FUROSEMIDE 20 MG: 10 INJECTION, SOLUTION INTRAMUSCULAR; INTRAVENOUS at 12:20

## 2023-04-08 RX ADMIN — ENOXAPARIN SODIUM 40 MG: 100 INJECTION SUBCUTANEOUS at 16:43

## 2023-04-08 RX ADMIN — IPRATROPIUM BROMIDE AND ALBUTEROL SULFATE 1 AMPULE: 2.5; .5 SOLUTION RESPIRATORY (INHALATION) at 11:36

## 2023-04-08 ASSESSMENT — ENCOUNTER SYMPTOMS
SHORTNESS OF BREATH: 1
COUGH: 1

## 2023-04-08 ASSESSMENT — PAIN - FUNCTIONAL ASSESSMENT: PAIN_FUNCTIONAL_ASSESSMENT: NONE - DENIES PAIN

## 2023-04-09 LAB
ANION GAP SERPL CALCULATED.3IONS-SCNC: 11 MMOL/L (ref 3–16)
ANTI-XA UNFRAC HEPARIN: 0.37 IU/ML (ref 0.3–0.7)
ANTI-XA UNFRAC HEPARIN: 0.47 IU/ML (ref 0.3–0.7)
APTT BLD: 38.5 SEC (ref 22.7–35.9)
BUN SERPL-MCNC: 30 MG/DL (ref 7–20)
CALCIUM SERPL-MCNC: 8 MG/DL (ref 8.3–10.6)
CHLORIDE SERPL-SCNC: 91 MMOL/L (ref 99–110)
CHOLEST SERPL-MCNC: 85 MG/DL (ref 0–199)
CO2 SERPL-SCNC: 25 MMOL/L (ref 21–32)
CREAT SERPL-MCNC: 1.5 MG/DL (ref 0.8–1.3)
DEPRECATED RDW RBC AUTO: 14.4 % (ref 12.4–15.4)
EST. AVERAGE GLUCOSE BLD GHB EST-MCNC: 108.3 MG/DL
GFR SERPLBLD CREATININE-BSD FMLA CKD-EPI: 45 ML/MIN/{1.73_M2}
GLUCOSE BLD-MCNC: 118 MG/DL (ref 70–99)
GLUCOSE BLD-MCNC: 125 MG/DL (ref 70–99)
GLUCOSE BLD-MCNC: 151 MG/DL (ref 70–99)
GLUCOSE BLD-MCNC: 157 MG/DL (ref 70–99)
GLUCOSE SERPL-MCNC: 131 MG/DL (ref 70–99)
HBA1C MFR BLD: 5.4 %
HCT VFR BLD AUTO: 30.1 % (ref 40.5–52.5)
HDLC SERPL-MCNC: 47 MG/DL (ref 40–60)
HGB BLD-MCNC: 10.6 G/DL (ref 13.5–17.5)
INR PPP: 1.31 (ref 0.84–1.16)
IRON SATN MFR SERPL: 23 % (ref 20–50)
IRON SERPL-MCNC: 66 UG/DL (ref 59–158)
LDLC SERPL CALC-MCNC: 22 MG/DL
MAGNESIUM SERPL-MCNC: 1.9 MG/DL (ref 1.8–2.4)
MCH RBC QN AUTO: 33.4 PG (ref 26–34)
MCHC RBC AUTO-ENTMCNC: 35.1 G/DL (ref 31–36)
MCV RBC AUTO: 95.1 FL (ref 80–100)
PERFORMED ON: ABNORMAL
PLATELET # BLD AUTO: 256 K/UL (ref 135–450)
PMV BLD AUTO: 6.9 FL (ref 5–10.5)
POTASSIUM SERPL-SCNC: 3.3 MMOL/L (ref 3.5–5.1)
PROTHROMBIN TIME: 16.3 SEC (ref 11.5–14.8)
RBC # BLD AUTO: 3.17 M/UL (ref 4.2–5.9)
SODIUM SERPL-SCNC: 127 MMOL/L (ref 136–145)
SODIUM SERPL-SCNC: 131 MMOL/L (ref 136–145)
SODIUM UR-SCNC: 61 MMOL/L
TIBC SERPL-MCNC: 293 UG/DL (ref 260–445)
TRIGL SERPL-MCNC: 78 MG/DL (ref 0–150)
TSH SERPL DL<=0.005 MIU/L-ACNC: 2.67 UIU/ML (ref 0.27–4.2)
VLDLC SERPL CALC-MCNC: 16 MG/DL
WBC # BLD AUTO: 9 K/UL (ref 4–11)

## 2023-04-09 PROCEDURE — 6370000000 HC RX 637 (ALT 250 FOR IP): Performed by: INTERNAL MEDICINE

## 2023-04-09 PROCEDURE — 85520 HEPARIN ASSAY: CPT

## 2023-04-09 PROCEDURE — 85610 PROTHROMBIN TIME: CPT

## 2023-04-09 PROCEDURE — 80048 BASIC METABOLIC PNL TOTAL CA: CPT

## 2023-04-09 PROCEDURE — 83935 ASSAY OF URINE OSMOLALITY: CPT

## 2023-04-09 PROCEDURE — 2700000000 HC OXYGEN THERAPY PER DAY

## 2023-04-09 PROCEDURE — 1200000000 HC SEMI PRIVATE

## 2023-04-09 PROCEDURE — 6360000002 HC RX W HCPCS: Performed by: INTERNAL MEDICINE

## 2023-04-09 PROCEDURE — 85027 COMPLETE CBC AUTOMATED: CPT

## 2023-04-09 PROCEDURE — 99222 1ST HOSP IP/OBS MODERATE 55: CPT | Performed by: INTERNAL MEDICINE

## 2023-04-09 PROCEDURE — 83735 ASSAY OF MAGNESIUM: CPT

## 2023-04-09 PROCEDURE — 36415 COLL VENOUS BLD VENIPUNCTURE: CPT

## 2023-04-09 PROCEDURE — 94761 N-INVAS EAR/PLS OXIMETRY MLT: CPT

## 2023-04-09 PROCEDURE — 84295 ASSAY OF SERUM SODIUM: CPT

## 2023-04-09 PROCEDURE — 84300 ASSAY OF URINE SODIUM: CPT

## 2023-04-09 PROCEDURE — 2580000003 HC RX 258: Performed by: INTERNAL MEDICINE

## 2023-04-09 PROCEDURE — 85730 THROMBOPLASTIN TIME PARTIAL: CPT

## 2023-04-09 RX ORDER — HEPARIN SODIUM 10000 [USP'U]/100ML
12 INJECTION, SOLUTION INTRAVENOUS CONTINUOUS
Status: DISCONTINUED | OUTPATIENT
Start: 2023-04-09 | End: 2023-04-10

## 2023-04-09 RX ORDER — ASPIRIN 81 MG/1
81 TABLET, CHEWABLE ORAL DAILY
Status: DISCONTINUED | OUTPATIENT
Start: 2023-04-10 | End: 2023-04-14 | Stop reason: HOSPADM

## 2023-04-09 RX ORDER — HEPARIN SODIUM 1000 [USP'U]/ML
2000 INJECTION, SOLUTION INTRAVENOUS; SUBCUTANEOUS PRN
Status: DISCONTINUED | OUTPATIENT
Start: 2023-04-09 | End: 2023-04-14

## 2023-04-09 RX ORDER — HEPARIN SODIUM 1000 [USP'U]/ML
4000 INJECTION, SOLUTION INTRAVENOUS; SUBCUTANEOUS PRN
Status: DISCONTINUED | OUTPATIENT
Start: 2023-04-09 | End: 2023-04-14

## 2023-04-09 RX ORDER — HEPARIN SODIUM 1000 [USP'U]/ML
60 INJECTION, SOLUTION INTRAVENOUS; SUBCUTANEOUS ONCE
Status: DISCONTINUED | OUTPATIENT
Start: 2023-04-09 | End: 2023-04-09 | Stop reason: DRUGHIGH

## 2023-04-09 RX ADMIN — CARVEDILOL 3.12 MG: 3.12 TABLET, FILM COATED ORAL at 08:29

## 2023-04-09 RX ADMIN — AMLODIPINE BESYLATE 5 MG: 5 TABLET ORAL at 21:13

## 2023-04-09 RX ADMIN — FUROSEMIDE 40 MG: 10 INJECTION, SOLUTION INTRAMUSCULAR; INTRAVENOUS at 08:29

## 2023-04-09 RX ADMIN — CARVEDILOL 3.12 MG: 3.12 TABLET, FILM COATED ORAL at 16:26

## 2023-04-09 RX ADMIN — HEPARIN SODIUM 12 UNITS/KG/HR: 10000 INJECTION, SOLUTION INTRAVENOUS at 10:24

## 2023-04-09 RX ADMIN — AMLODIPINE BESYLATE 5 MG: 5 TABLET ORAL at 08:30

## 2023-04-09 RX ADMIN — POTASSIUM CHLORIDE 40 MEQ: 1500 TABLET, EXTENDED RELEASE ORAL at 08:41

## 2023-04-09 RX ADMIN — ATORVASTATIN CALCIUM 10 MG: 10 TABLET, FILM COATED ORAL at 08:30

## 2023-04-09 RX ADMIN — ENOXAPARIN SODIUM 40 MG: 100 INJECTION SUBCUTANEOUS at 08:30

## 2023-04-09 RX ADMIN — LISINOPRIL 10 MG: 10 TABLET ORAL at 08:30

## 2023-04-09 RX ADMIN — Medication 10 ML: at 08:30

## 2023-04-09 RX ADMIN — Medication 10 ML: at 21:28

## 2023-04-09 RX ADMIN — ALOGLIPTIN 6.25 MG: 6.25 TABLET, FILM COATED ORAL at 08:41

## 2023-04-09 RX ADMIN — ASPIRIN 325 MG: 325 TABLET ORAL at 08:29

## 2023-04-09 ASSESSMENT — PAIN SCALES - GENERAL
PAINLEVEL_OUTOF10: 0

## 2023-04-10 PROBLEM — I35.0 SEVERE AORTIC VALVE STENOSIS: Status: ACTIVE | Noted: 2023-04-10

## 2023-04-10 LAB
ANION GAP SERPL CALCULATED.3IONS-SCNC: 10 MMOL/L (ref 3–16)
ANTI-XA UNFRAC HEPARIN: 0.25 IU/ML (ref 0.3–0.7)
ANTI-XA UNFRAC HEPARIN: 0.42 IU/ML (ref 0.3–0.7)
ANTI-XA UNFRAC HEPARIN: 0.46 IU/ML (ref 0.3–0.7)
ANTI-XA UNFRAC HEPARIN: 0.52 IU/ML (ref 0.3–0.7)
BUN SERPL-MCNC: 32 MG/DL (ref 7–20)
CALCIUM SERPL-MCNC: 8.6 MG/DL (ref 8.3–10.6)
CHLORIDE SERPL-SCNC: 94 MMOL/L (ref 99–110)
CO2 SERPL-SCNC: 27 MMOL/L (ref 21–32)
CREAT SERPL-MCNC: 1.4 MG/DL (ref 0.8–1.3)
GFR SERPLBLD CREATININE-BSD FMLA CKD-EPI: 49 ML/MIN/{1.73_M2}
GLUCOSE BLD-MCNC: 111 MG/DL (ref 70–99)
GLUCOSE BLD-MCNC: 146 MG/DL (ref 70–99)
GLUCOSE BLD-MCNC: 151 MG/DL (ref 70–99)
GLUCOSE BLD-MCNC: 176 MG/DL (ref 70–99)
GLUCOSE SERPL-MCNC: 109 MG/DL (ref 70–99)
MAGNESIUM SERPL-MCNC: 1.9 MG/DL (ref 1.8–2.4)
PERFORMED ON: ABNORMAL
POTASSIUM SERPL-SCNC: 3.7 MMOL/L (ref 3.5–5.1)
SODIUM SERPL-SCNC: 131 MMOL/L (ref 136–145)

## 2023-04-10 PROCEDURE — 6370000000 HC RX 637 (ALT 250 FOR IP): Performed by: INTERNAL MEDICINE

## 2023-04-10 PROCEDURE — 1200000000 HC SEMI PRIVATE

## 2023-04-10 PROCEDURE — 2700000000 HC OXYGEN THERAPY PER DAY

## 2023-04-10 PROCEDURE — 2580000003 HC RX 258: Performed by: INTERNAL MEDICINE

## 2023-04-10 PROCEDURE — 36415 COLL VENOUS BLD VENIPUNCTURE: CPT

## 2023-04-10 PROCEDURE — 80048 BASIC METABOLIC PNL TOTAL CA: CPT

## 2023-04-10 PROCEDURE — 6360000002 HC RX W HCPCS: Performed by: INTERNAL MEDICINE

## 2023-04-10 PROCEDURE — 99232 SBSQ HOSP IP/OBS MODERATE 35: CPT | Performed by: INTERNAL MEDICINE

## 2023-04-10 PROCEDURE — 94761 N-INVAS EAR/PLS OXIMETRY MLT: CPT

## 2023-04-10 PROCEDURE — 85520 HEPARIN ASSAY: CPT

## 2023-04-10 PROCEDURE — 99223 1ST HOSP IP/OBS HIGH 75: CPT | Performed by: NURSE PRACTITIONER

## 2023-04-10 PROCEDURE — 83735 ASSAY OF MAGNESIUM: CPT

## 2023-04-10 RX ORDER — HEPARIN SODIUM 10000 [USP'U]/100ML
1140 INJECTION, SOLUTION INTRAVENOUS CONTINUOUS
Status: DISCONTINUED | OUTPATIENT
Start: 2023-04-10 | End: 2023-04-14

## 2023-04-10 RX ORDER — HEPARIN SODIUM 1000 [USP'U]/ML
2000 INJECTION, SOLUTION INTRAVENOUS; SUBCUTANEOUS ONCE
Status: COMPLETED | OUTPATIENT
Start: 2023-04-10 | End: 2023-04-10

## 2023-04-10 RX ADMIN — LISINOPRIL 10 MG: 10 TABLET ORAL at 08:27

## 2023-04-10 RX ADMIN — ASPIRIN 81 MG: 81 TABLET, CHEWABLE ORAL at 08:27

## 2023-04-10 RX ADMIN — CARVEDILOL 3.12 MG: 3.12 TABLET, FILM COATED ORAL at 08:27

## 2023-04-10 RX ADMIN — ALOGLIPTIN 6.25 MG: 6.25 TABLET, FILM COATED ORAL at 12:10

## 2023-04-10 RX ADMIN — Medication 10 ML: at 20:48

## 2023-04-10 RX ADMIN — CARVEDILOL 3.12 MG: 3.12 TABLET, FILM COATED ORAL at 18:08

## 2023-04-10 RX ADMIN — ATORVASTATIN CALCIUM 10 MG: 10 TABLET, FILM COATED ORAL at 08:27

## 2023-04-10 RX ADMIN — HEPARIN SODIUM 1000 UNITS/HR: 10000 INJECTION, SOLUTION INTRAVENOUS at 06:26

## 2023-04-10 RX ADMIN — AMLODIPINE BESYLATE 5 MG: 5 TABLET ORAL at 08:27

## 2023-04-10 RX ADMIN — HEPARIN SODIUM 2000 UNITS: 1000 INJECTION INTRAVENOUS; SUBCUTANEOUS at 06:25

## 2023-04-10 RX ADMIN — AMLODIPINE BESYLATE 5 MG: 5 TABLET ORAL at 20:47

## 2023-04-10 ASSESSMENT — PAIN SCALES - GENERAL
PAINLEVEL_OUTOF10: 0
PAINLEVEL_OUTOF10: 0

## 2023-04-10 NOTE — PLAN OF CARE
Problem: Discharge Planning  Goal: Discharge to home or other facility with appropriate resources  Outcome: Progressing  Flowsheets (Taken 4/9/2023 1422 by Jatinder Mtz, RN)  Discharge to home or other facility with appropriate resources:   Identify barriers to discharge with patient and caregiver   Arrange for needed discharge resources and transportation as appropriate   Identify discharge learning needs (meds, wound care, etc)     Problem: Safety - Adult  Goal: Free from fall injury  Outcome: Progressing     Problem: Pain  Goal: Verbalizes/displays adequate comfort level or baseline comfort level  Outcome: Progressing  Flowsheets (Taken 4/9/2023 1623 by Jatinder Mtz, RN)  Verbalizes/displays adequate comfort level or baseline comfort level:   Encourage patient to monitor pain and request assistance   Assess pain using appropriate pain scale   Administer analgesics based on type and severity of pain and evaluate response   Implement non-pharmacological measures as appropriate and evaluate response     Problem: ABCDS Injury Assessment  Goal: Absence of physical injury  Outcome: Progressing

## 2023-04-10 NOTE — DISCHARGE INSTRUCTIONS
Heart Failure Resources:    Heart Failure Interactive Workbook:   Go to [email or web address].com/aha-heartfailure for a Free Heart Failure Interactive Workbook provided by [employer]. This interactive workbook will provide information on Healthier Living with Heart Failure. Please copy and paste link into search bar. Use your mouse to scroll through the pages. HF Hortense clif:   Heart Failure Free smart phone clif available for iPhone and Android download. Use your phone to track sodium intake, fluid intake, symptoms, and weight. Low Sodium Diet:  Go to www. [employer] website for H5 which is Low Sodium! [employer] is a dialysis company, but this website offers free seasonal cookbooks. Each quarter, they will release 25-30 new recipes with a breakdown of calories, sodium, and glucose. Recipes:   Go to www.[employer]/recipes website for free recipes. Home Exercise Program:     Identification of Green/yellow/Red zones: You should be able to identify when you feel good (green zone), if you have 1-2 symptoms of HF (yellow zone), or if you are in need of medical attention (red zone). In your CHF education folder you were provided a stop light tool to outline this information. We want to you to rate your exertion levels: Our therapy team has discussed means of identification with you such as the \"Agustin scale. \"  The Agustin rating scale ranges from 6 to 20, where 6 means \"no exertion at all\" and 20 means \"maximal exertion. \" The goal is to use this to gauge how much effort it is taking for you to do your normal daily tasks. You should be able to recognize when too much exertion is being expended. Elements of Energy Conservation:   Prioritize/Plan: Decide what needs to be done today, and what can wait for a later date, write to do lists, Plan ahead to avoid extra trips, Gather supplies and equipment needed before starting an activity.    Position: Avoid tiring and awkward posture

## 2023-04-10 NOTE — CARE COORDINATION
Case Management Assessment  Initial Evaluation    Date/Time of Evaluation: 4/10/2023 4:27 PM  Assessment Completed by: Misty Allred RN    If patient is discharged prior to next notation, then this note serves as note for discharge by case management. Patient Name: Lovina Cranker                   YOB: 1940  Diagnosis: Pleural effusion [J90]  Pulmonary edema cardiac cause ([address] [I50.1]  Hypoxia [R09.02]  Elevated troponin [R77.8]  Pulmonary vascular congestion [R09.89]                   Date / Time: 4/8/2023 10:57 AM    Patient Admission Status: Inpatient   Readmission Risk (Low < 19, Mod (19-27), High > 27): Readmission Risk Score: 14.2    Current PCP: Daljit Gutierrez MD  PCP verified by CM? Yes    Chart Reviewed: Yes      History Provided by: Patient  Patient Orientation: Alert and Oriented, Person, Place, Situation, Self    Patient Cognition: Alert    Hospitalization in the last 30 days (Readmission):  No    If yes, Readmission Assessment in  Navigator will be completed. Advance Directives:      Code Status: Full Code   Patient's Primary Decision Maker is: Legal Next of Kin      Discharge Planning:    Patient lives with: Alone Type of Home: Apartment  Primary Care Giver: Self  Patient Support Systems include: Family Members   Current Financial resources: Medicare  Current community resources: None  Current services prior to admission: None            Current DME: [facility]             Type of Home Care services:  None    ADLS  Prior functional level: Independent in ADLs/IADLs  Current functional level: Independent in ADLs/IADLs    PT AM-PAC:   /24  OT AM-PAC:   /24    Family can provide assistance at DC: Yes  Would you like Case Management to discuss the discharge plan with any other family members/significant others, and if so, who? Yes  Plans to Return to Present Housing: Yes  Potential Assistance needed at discharge:  Other (Comment) (following for needs)            Potential DME:

## 2023-04-11 ENCOUNTER — APPOINTMENT (OUTPATIENT)
Dept: VASCULAR LAB | Age: 83
DRG: 291 | End: 2023-04-11
Payer: MEDICARE

## 2023-04-11 LAB
ANION GAP SERPL CALCULATED.3IONS-SCNC: 8 MMOL/L (ref 3–16)
ANTI-XA UNFRAC HEPARIN: 0.43 IU/ML (ref 0.3–0.7)
BUN SERPL-MCNC: 34 MG/DL (ref 7–20)
CALCIUM SERPL-MCNC: 8.5 MG/DL (ref 8.3–10.6)
CHLORIDE SERPL-SCNC: 95 MMOL/L (ref 99–110)
CO2 SERPL-SCNC: 27 MMOL/L (ref 21–32)
CREAT SERPL-MCNC: 1.7 MG/DL (ref 0.8–1.3)
DEPRECATED RDW RBC AUTO: 14.3 % (ref 12.4–15.4)
GFR SERPLBLD CREATININE-BSD FMLA CKD-EPI: 39 ML/MIN/{1.73_M2}
GLUCOSE BLD-MCNC: 112 MG/DL (ref 70–99)
GLUCOSE BLD-MCNC: 128 MG/DL (ref 70–99)
GLUCOSE BLD-MCNC: 132 MG/DL (ref 70–99)
GLUCOSE BLD-MCNC: 236 MG/DL (ref 70–99)
GLUCOSE SERPL-MCNC: 115 MG/DL (ref 70–99)
HCT VFR BLD AUTO: 27.2 % (ref 40.5–52.5)
HGB BLD-MCNC: 9.5 G/DL (ref 13.5–17.5)
MAGNESIUM SERPL-MCNC: 2.1 MG/DL (ref 1.8–2.4)
MCH RBC QN AUTO: 33.3 PG (ref 26–34)
MCHC RBC AUTO-ENTMCNC: 34.8 G/DL (ref 31–36)
MCV RBC AUTO: 95.8 FL (ref 80–100)
NT-PROBNP SERPL-MCNC: ABNORMAL PG/ML (ref 0–449)
OSMOLALITY UR: 386 MOSM/KG (ref 390–1070)
PERFORMED ON: ABNORMAL
PLATELET # BLD AUTO: 210 K/UL (ref 135–450)
PMV BLD AUTO: 6.9 FL (ref 5–10.5)
POTASSIUM SERPL-SCNC: 4 MMOL/L (ref 3.5–5.1)
RBC # BLD AUTO: 2.84 M/UL (ref 4.2–5.9)
SODIUM SERPL-SCNC: 130 MMOL/L (ref 136–145)
WBC # BLD AUTO: 7 K/UL (ref 4–11)

## 2023-04-11 PROCEDURE — 1200000000 HC SEMI PRIVATE

## 2023-04-11 PROCEDURE — 6370000000 HC RX 637 (ALT 250 FOR IP): Performed by: INTERNAL MEDICINE

## 2023-04-11 PROCEDURE — 93880 EXTRACRANIAL BILAT STUDY: CPT

## 2023-04-11 PROCEDURE — 83880 ASSAY OF NATRIURETIC PEPTIDE: CPT

## 2023-04-11 PROCEDURE — 2580000003 HC RX 258: Performed by: INTERNAL MEDICINE

## 2023-04-11 PROCEDURE — 85027 COMPLETE CBC AUTOMATED: CPT

## 2023-04-11 PROCEDURE — 99232 SBSQ HOSP IP/OBS MODERATE 35: CPT | Performed by: INTERNAL MEDICINE

## 2023-04-11 PROCEDURE — 36415 COLL VENOUS BLD VENIPUNCTURE: CPT

## 2023-04-11 PROCEDURE — 6360000002 HC RX W HCPCS: Performed by: INTERNAL MEDICINE

## 2023-04-11 PROCEDURE — 85520 HEPARIN ASSAY: CPT

## 2023-04-11 PROCEDURE — 80048 BASIC METABOLIC PNL TOTAL CA: CPT

## 2023-04-11 PROCEDURE — 83735 ASSAY OF MAGNESIUM: CPT

## 2023-04-11 RX ORDER — SODIUM CHLORIDE 9 MG/ML
INJECTION, SOLUTION INTRAVENOUS CONTINUOUS
Status: ACTIVE | OUTPATIENT
Start: 2023-04-11 | End: 2023-04-11

## 2023-04-11 RX ADMIN — ATORVASTATIN CALCIUM 10 MG: 10 TABLET, FILM COATED ORAL at 08:34

## 2023-04-11 RX ADMIN — SODIUM CHLORIDE: 9 INJECTION, SOLUTION INTRAVENOUS at 12:09

## 2023-04-11 RX ADMIN — HEPARIN SODIUM 1000 UNITS/HR: 10000 INJECTION, SOLUTION INTRAVENOUS at 14:54

## 2023-04-11 RX ADMIN — ALOGLIPTIN 6.25 MG: 6.25 TABLET, FILM COATED ORAL at 08:34

## 2023-04-11 RX ADMIN — LISINOPRIL 10 MG: 10 TABLET ORAL at 08:34

## 2023-04-11 RX ADMIN — CARVEDILOL 3.12 MG: 3.12 TABLET, FILM COATED ORAL at 08:34

## 2023-04-11 RX ADMIN — CARVEDILOL 3.12 MG: 3.12 TABLET, FILM COATED ORAL at 16:55

## 2023-04-11 RX ADMIN — Medication 10 ML: at 19:45

## 2023-04-11 RX ADMIN — ASPIRIN 81 MG: 81 TABLET, CHEWABLE ORAL at 08:34

## 2023-04-11 RX ADMIN — AMLODIPINE BESYLATE 5 MG: 5 TABLET ORAL at 19:45

## 2023-04-11 RX ADMIN — AMLODIPINE BESYLATE 5 MG: 5 TABLET ORAL at 08:34

## 2023-04-11 ASSESSMENT — PAIN SCALES - GENERAL
PAINLEVEL_OUTOF10: 0
PAINLEVEL_OUTOF10: 0

## 2023-04-11 NOTE — PLAN OF CARE
Problem: Discharge Planning  Goal: Discharge to home or other facility with appropriate resources  4/11/2023 0925 by Queen Samy RN  Outcome: Progressing  4/11/2023 0044 by Neelam Ramirez RN  Outcome: Progressing     Problem: Safety - Adult  Goal: Free from fall injury  4/11/2023 0925 by Queen Samy RN  Outcome: Progressing  4/11/2023 0044 by Neelam Ramirez RN  Outcome: Progressing     Problem: Pain  Goal: Verbalizes/displays adequate comfort level or baseline comfort level  4/11/2023 0925 by Queen Samy RN  Outcome: Progressing  4/11/2023 0044 by Neelam Ramirez RN  Outcome: Progressing     Problem: ABCDS Injury Assessment  Goal: Absence of physical injury  4/11/2023 0925 by Queen Samy RN  Outcome: Progressing  4/11/2023 0044 by Neelam Ramirez RN  Outcome: Progressing

## 2023-04-12 ENCOUNTER — APPOINTMENT (OUTPATIENT)
Dept: ULTRASOUND IMAGING | Age: 83
DRG: 291 | End: 2023-04-12
Payer: MEDICARE

## 2023-04-12 ENCOUNTER — APPOINTMENT (OUTPATIENT)
Dept: GENERAL RADIOLOGY | Age: 83
DRG: 291 | End: 2023-04-12
Payer: MEDICARE

## 2023-04-12 LAB
ANION GAP SERPL CALCULATED.3IONS-SCNC: 8 MMOL/L (ref 3–16)
ANTI-XA UNFRAC HEPARIN: 0.34 IU/ML (ref 0.3–0.7)
BACTERIA BLD CULT ORG #2: NORMAL
BACTERIA BLD CULT: NORMAL
BUN SERPL-MCNC: 38 MG/DL (ref 7–20)
CALCIUM SERPL-MCNC: 8.4 MG/DL (ref 8.3–10.6)
CHLORIDE SERPL-SCNC: 94 MMOL/L (ref 99–110)
CO2 SERPL-SCNC: 28 MMOL/L (ref 21–32)
CREAT SERPL-MCNC: 1.4 MG/DL (ref 0.8–1.3)
DEPRECATED RDW RBC AUTO: 14.2 % (ref 12.4–15.4)
GFR SERPLBLD CREATININE-BSD FMLA CKD-EPI: 49 ML/MIN/{1.73_M2}
GLUCOSE BLD-MCNC: 108 MG/DL (ref 70–99)
GLUCOSE BLD-MCNC: 129 MG/DL (ref 70–99)
GLUCOSE BLD-MCNC: 138 MG/DL (ref 70–99)
GLUCOSE BLD-MCNC: 183 MG/DL (ref 70–99)
GLUCOSE SERPL-MCNC: 106 MG/DL (ref 70–99)
HCT VFR BLD AUTO: 26 % (ref 40.5–52.5)
HGB BLD-MCNC: 9 G/DL (ref 13.5–17.5)
MAGNESIUM SERPL-MCNC: 2.2 MG/DL (ref 1.8–2.4)
MCH RBC QN AUTO: 33.2 PG (ref 26–34)
MCHC RBC AUTO-ENTMCNC: 34.6 G/DL (ref 31–36)
MCV RBC AUTO: 96 FL (ref 80–100)
PERFORMED ON: ABNORMAL
PLATELET # BLD AUTO: 195 K/UL (ref 135–450)
PMV BLD AUTO: 7.3 FL (ref 5–10.5)
POTASSIUM SERPL-SCNC: 4.2 MMOL/L (ref 3.5–5.1)
RBC # BLD AUTO: 2.71 M/UL (ref 4.2–5.9)
SODIUM SERPL-SCNC: 130 MMOL/L (ref 136–145)
WBC # BLD AUTO: 6.3 K/UL (ref 4–11)

## 2023-04-12 PROCEDURE — 0W993ZZ DRAINAGE OF RIGHT PLEURAL CAVITY, PERCUTANEOUS APPROACH: ICD-10-PCS | Performed by: RADIOLOGY

## 2023-04-12 PROCEDURE — 2580000003 HC RX 258: Performed by: INTERNAL MEDICINE

## 2023-04-12 PROCEDURE — 83735 ASSAY OF MAGNESIUM: CPT

## 2023-04-12 PROCEDURE — 71045 X-RAY EXAM CHEST 1 VIEW: CPT

## 2023-04-12 PROCEDURE — 2580000003 HC RX 258: Performed by: NURSE PRACTITIONER

## 2023-04-12 PROCEDURE — 85520 HEPARIN ASSAY: CPT

## 2023-04-12 PROCEDURE — 32555 ASPIRATE PLEURA W/ IMAGING: CPT

## 2023-04-12 PROCEDURE — 85027 COMPLETE CBC AUTOMATED: CPT

## 2023-04-12 PROCEDURE — 99233 SBSQ HOSP IP/OBS HIGH 50: CPT | Performed by: NURSE PRACTITIONER

## 2023-04-12 PROCEDURE — 6360000002 HC RX W HCPCS: Performed by: INTERNAL MEDICINE

## 2023-04-12 PROCEDURE — 6370000000 HC RX 637 (ALT 250 FOR IP): Performed by: INTERNAL MEDICINE

## 2023-04-12 PROCEDURE — 80048 BASIC METABOLIC PNL TOTAL CA: CPT

## 2023-04-12 PROCEDURE — 36415 COLL VENOUS BLD VENIPUNCTURE: CPT

## 2023-04-12 PROCEDURE — 1200000000 HC SEMI PRIVATE

## 2023-04-12 RX ORDER — SODIUM CHLORIDE 9 MG/ML
INJECTION, SOLUTION INTRAVENOUS CONTINUOUS
Status: ACTIVE | OUTPATIENT
Start: 2023-04-12 | End: 2023-04-12

## 2023-04-12 RX ADMIN — CARVEDILOL 3.12 MG: 3.12 TABLET, FILM COATED ORAL at 09:45

## 2023-04-12 RX ADMIN — AMLODIPINE BESYLATE 5 MG: 5 TABLET ORAL at 09:45

## 2023-04-12 RX ADMIN — LISINOPRIL 10 MG: 10 TABLET ORAL at 09:45

## 2023-04-12 RX ADMIN — ATORVASTATIN CALCIUM 10 MG: 10 TABLET, FILM COATED ORAL at 09:45

## 2023-04-12 RX ADMIN — Medication 10 ML: at 20:27

## 2023-04-12 RX ADMIN — HEPARIN SODIUM 1000 UNITS/HR: 10000 INJECTION, SOLUTION INTRAVENOUS at 20:33

## 2023-04-12 RX ADMIN — ALOGLIPTIN 6.25 MG: 6.25 TABLET, FILM COATED ORAL at 09:45

## 2023-04-12 RX ADMIN — AMLODIPINE BESYLATE 5 MG: 5 TABLET ORAL at 20:27

## 2023-04-12 RX ADMIN — SODIUM CHLORIDE: 9 INJECTION, SOLUTION INTRAVENOUS at 10:36

## 2023-04-12 RX ADMIN — CARVEDILOL 3.12 MG: 3.12 TABLET, FILM COATED ORAL at 16:56

## 2023-04-12 RX ADMIN — ASPIRIN 81 MG: 81 TABLET, CHEWABLE ORAL at 09:45

## 2023-04-12 ASSESSMENT — PAIN SCALES - GENERAL: PAINLEVEL_OUTOF10: 0

## 2023-04-12 NOTE — CARE COORDINATION
Spoke with RN and MD.  Plan is for patient to potentially get left heart cath tomorrow pending creatinine. He is still on a heparin drip. Patient will likely transfer to [facility] for a TAVR. Patient is from home and was independent prior to admission. Patient did not anticipate any needs from CM at initial assessment but CM will continue to follow in the event that needs arise.

## 2023-04-12 NOTE — PLAN OF CARE
Problem: Discharge Planning  Goal: Discharge to home or other facility with appropriate resources  4/12/2023 1012 by Corry Wolf RN  Outcome: Progressing  4/12/2023 0014 by Kota Morris RN  Outcome: Progressing     Problem: Safety - Adult  Goal: Free from fall injury  4/12/2023 1012 by Corry Wolf RN  Outcome: Progressing  4/12/2023 0014 by Kota Morris RN  Outcome: Progressing     Problem: Pain  Goal: Verbalizes/displays adequate comfort level or baseline comfort level  4/12/2023 1012 by Corry Wolf RN  Outcome: Progressing  4/12/2023 0014 by Kota Morris RN  Outcome: Progressing     Problem: ABCDS Injury Assessment  Goal: Absence of physical injury  4/12/2023 0014 by Kota Morris RN  Outcome: Progressing

## 2023-04-13 ENCOUNTER — APPOINTMENT (OUTPATIENT)
Dept: CARDIAC CATH/INVASIVE PROCEDURES | Age: 83
DRG: 291 | End: 2023-04-13
Payer: MEDICARE

## 2023-04-13 ENCOUNTER — APPOINTMENT (OUTPATIENT)
Dept: CT IMAGING | Age: 83
DRG: 291 | End: 2023-04-13
Payer: MEDICARE

## 2023-04-13 ENCOUNTER — APPOINTMENT (OUTPATIENT)
Dept: GENERAL RADIOLOGY | Age: 83
DRG: 291 | End: 2023-04-13
Payer: MEDICARE

## 2023-04-13 LAB
ANION GAP SERPL CALCULATED.3IONS-SCNC: 9 MMOL/L (ref 3–16)
ANTI-XA UNFRAC HEPARIN: 0.24 IU/ML (ref 0.3–0.7)
ANTI-XA UNFRAC HEPARIN: 0.35 IU/ML (ref 0.3–0.7)
ANTI-XA UNFRAC HEPARIN: 0.44 IU/ML (ref 0.3–0.7)
BUN SERPL-MCNC: 35 MG/DL (ref 7–20)
CALCIUM SERPL-MCNC: 8.7 MG/DL (ref 8.3–10.6)
CHLORIDE SERPL-SCNC: 97 MMOL/L (ref 99–110)
CO2 SERPL-SCNC: 25 MMOL/L (ref 21–32)
CREAT SERPL-MCNC: 1.3 MG/DL (ref 0.8–1.3)
DEPRECATED RDW RBC AUTO: 14.6 % (ref 12.4–15.4)
GFR SERPLBLD CREATININE-BSD FMLA CKD-EPI: 54 ML/MIN/{1.73_M2}
GLUCOSE BLD-MCNC: 130 MG/DL (ref 70–99)
GLUCOSE BLD-MCNC: 135 MG/DL (ref 70–99)
GLUCOSE BLD-MCNC: 164 MG/DL (ref 70–99)
GLUCOSE BLD-MCNC: 182 MG/DL (ref 70–99)
GLUCOSE SERPL-MCNC: 105 MG/DL (ref 70–99)
HCT VFR BLD AUTO: 27.1 % (ref 40.5–52.5)
HGB BLD-MCNC: 9.4 G/DL (ref 13.5–17.5)
MAGNESIUM SERPL-MCNC: 2.1 MG/DL (ref 1.8–2.4)
MCH RBC QN AUTO: 33.5 PG (ref 26–34)
MCHC RBC AUTO-ENTMCNC: 34.9 G/DL (ref 31–36)
MCV RBC AUTO: 96 FL (ref 80–100)
PERFORMED ON: ABNORMAL
PLATELET # BLD AUTO: 173 K/UL (ref 135–450)
PMV BLD AUTO: 7.1 FL (ref 5–10.5)
POTASSIUM SERPL-SCNC: 4.3 MMOL/L (ref 3.5–5.1)
RBC # BLD AUTO: 2.82 M/UL (ref 4.2–5.9)
SODIUM SERPL-SCNC: 131 MMOL/L (ref 136–145)
WBC # BLD AUTO: 6.5 K/UL (ref 4–11)

## 2023-04-13 PROCEDURE — 85520 HEPARIN ASSAY: CPT

## 2023-04-13 PROCEDURE — 83735 ASSAY OF MAGNESIUM: CPT

## 2023-04-13 PROCEDURE — 99232 SBSQ HOSP IP/OBS MODERATE 35: CPT | Performed by: NURSE PRACTITIONER

## 2023-04-13 PROCEDURE — 80048 BASIC METABOLIC PNL TOTAL CA: CPT

## 2023-04-13 PROCEDURE — 6370000000 HC RX 637 (ALT 250 FOR IP): Performed by: INTERNAL MEDICINE

## 2023-04-13 PROCEDURE — 36415 COLL VENOUS BLD VENIPUNCTURE: CPT

## 2023-04-13 PROCEDURE — 71045 X-RAY EXAM CHEST 1 VIEW: CPT

## 2023-04-13 PROCEDURE — 74174 CTA ABD&PLVS W/CONTRAST: CPT

## 2023-04-13 PROCEDURE — 1200000000 HC SEMI PRIVATE

## 2023-04-13 PROCEDURE — 6360000002 HC RX W HCPCS: Performed by: INTERNAL MEDICINE

## 2023-04-13 PROCEDURE — 2700000000 HC OXYGEN THERAPY PER DAY

## 2023-04-13 PROCEDURE — 85027 COMPLETE CBC AUTOMATED: CPT

## 2023-04-13 PROCEDURE — 6360000004 HC RX CONTRAST MEDICATION: Performed by: INTERNAL MEDICINE

## 2023-04-13 PROCEDURE — 94761 N-INVAS EAR/PLS OXIMETRY MLT: CPT

## 2023-04-13 RX ORDER — HEPARIN SODIUM 1000 [USP'U]/ML
2000 INJECTION, SOLUTION INTRAVENOUS; SUBCUTANEOUS ONCE
Status: COMPLETED | OUTPATIENT
Start: 2023-04-13 | End: 2023-04-13

## 2023-04-13 RX ADMIN — ASPIRIN 81 MG: 81 TABLET, CHEWABLE ORAL at 08:44

## 2023-04-13 RX ADMIN — ATORVASTATIN CALCIUM 10 MG: 10 TABLET, FILM COATED ORAL at 08:44

## 2023-04-13 RX ADMIN — ALOGLIPTIN 6.25 MG: 6.25 TABLET, FILM COATED ORAL at 08:44

## 2023-04-13 RX ADMIN — POLYETHYLENE GLYCOL 3350 17 G: 17 POWDER, FOR SOLUTION ORAL at 20:28

## 2023-04-13 RX ADMIN — IOPAMIDOL 150 ML: 755 INJECTION, SOLUTION INTRAVENOUS at 12:27

## 2023-04-13 RX ADMIN — AMLODIPINE BESYLATE 5 MG: 5 TABLET ORAL at 20:27

## 2023-04-13 RX ADMIN — HEPARIN SODIUM 1140 UNITS/HR: 10000 INJECTION, SOLUTION INTRAVENOUS at 20:36

## 2023-04-13 RX ADMIN — AMLODIPINE BESYLATE 5 MG: 5 TABLET ORAL at 08:46

## 2023-04-13 RX ADMIN — CARVEDILOL 3.12 MG: 3.12 TABLET, FILM COATED ORAL at 08:46

## 2023-04-13 RX ADMIN — LISINOPRIL 10 MG: 10 TABLET ORAL at 08:46

## 2023-04-13 RX ADMIN — HEPARIN SODIUM 2000 UNITS: 1000 INJECTION INTRAVENOUS; SUBCUTANEOUS at 07:32

## 2023-04-13 ASSESSMENT — PAIN SCALES - GENERAL
PAINLEVEL_OUTOF10: 0
PAINLEVEL_OUTOF10: 0
PAINLEVEL_OUTOF10: 4

## 2023-04-13 ASSESSMENT — PAIN DESCRIPTION - LOCATION: LOCATION: SHOULDER;BACK

## 2023-04-13 ASSESSMENT — PAIN DESCRIPTION - PAIN TYPE: TYPE: CHRONIC PAIN

## 2023-04-13 ASSESSMENT — PAIN DESCRIPTION - ORIENTATION: ORIENTATION: RIGHT;LEFT

## 2023-04-13 ASSESSMENT — PAIN DESCRIPTION - DESCRIPTORS: DESCRIPTORS: ACHING

## 2023-04-13 NOTE — PLAN OF CARE
Problem: Chronic Conditions and Co-morbidities  Goal: Patient's chronic conditions and co-morbidity symptoms are monitored and maintained or improved  Outcome: Progressing    Patient's EF (Ejection Fraction) is greater than 40%    Heart Failure Medications:  Diuretics[de-identified] None    (One of the following REQUIRED for EF </= 40%/SYSTOLIC FAILURE but MAY be used in EF% >40%/DIASTOLIC FAILURE)        ACE[de-identified] Lisinopril        ARB[de-identified] None         ARNI[de-identified] None    (Beta Blockers)  NON- Evidenced Based Beta Blocker (for EF% >40%/DIASTOLIC FAILURE): None    Evidenced Based Beta Blocker::(REQUIRED for EF% <40%/SYSTOLIC FAILURE) Carvedilol- Coreg  . .................................................................................................................................................. Patient's weights and intake/output reviewed: Yes    Patient's Last Weight: 157 lbs obtained by standing scale. Difference of 1 lbs less than last documented weight. Intake/Output Summary (Last 24 hours) at 4/13/2023 0500  Last data filed at 4/13/2023 0442  Gross per 24 hour   Intake 220 ml   Output 1100 ml   Net -880 ml       Daily Weight log at bedside and being used: Jumo Provided: yes    Comorbidities Reviewed Yes    Patient has a past medical history of CAD (coronary artery disease), Diabetes mellitus ([address] Hypertension, and Unspecified cerebral artery occlusion with cerebral infarction. >>For CHF and Comorbidity documentation on Education Time and Topics, please see Education Tab    Progressive Mobility Assessment:  What is this patient's Current Level of Mobility?: Ambulatory- with Assistance  How was this patient Mobilized today?: Edge of Bed, Up to Chair, Bedside Commode,  Up to Toilet/Shower, Up in Room, and Up in Hallway, ambulated 10 ft                 With Whom? Nurse and Self                 Level of Difficulty/Assistance: 1x Assist     Pt sitting in bed at this time on room air.  Pt denies shortness

## 2023-04-13 NOTE — PLAN OF CARE
Problem: Discharge Planning  Goal: Discharge to home or other facility with appropriate resources  4/12/2023 2310 by Kavita Davenport RN  Outcome: Progressing  Flowsheets (Taken 4/12/2023 2300)  Discharge to home or other facility with appropriate resources: Identify barriers to discharge with patient and caregiver  Problem: Safety - Adult  Goal: Free from fall injury  4/12/2023 2310 by Kavita Davenport RN  Outcome: Progressing  Flowsheets (Taken 4/12/2023 2306)  Free From Fall Injury: Instruct family/caregiver on patient safety  Problem: Pain  Goal: Verbalizes/displays adequate comfort level or baseline comfort level

## 2023-04-13 NOTE — PLAN OF CARE
Problem: Discharge Planning  Goal: Discharge to home or other facility with appropriate resources  4/13/2023 1019 by Vaughn Joseph RN  Outcome: Progressing     Problem: Safety - Adult  Goal: Free from fall injury  4/13/2023 1019 by Vaughn Joseph RN  Outcome: Progressing     Problem: Pain  Goal: Verbalizes/displays adequate comfort level or baseline comfort level  Outcome: Progressing     Problem: Chronic Conditions and Co-morbidities  Goal: Patient's chronic conditions and co-morbidity symptoms are monitored and maintained or improved  4/13/2023 1019 by aVughn Joseph RN  Outcome: Progressing  Note: Pt will have accuchecks before meals and at bedtime with sliding scale insulin in place for coverage. Will continue to monitor for signs and symptoms of hypoglycemia and hyperglycemia throughout shift.

## 2023-04-14 ENCOUNTER — APPOINTMENT (OUTPATIENT)
Dept: CARDIAC CATH/INVASIVE PROCEDURES | Age: 83
DRG: 291 | End: 2023-04-14
Payer: MEDICARE

## 2023-04-14 VITALS
DIASTOLIC BLOOD PRESSURE: 51 MMHG | SYSTOLIC BLOOD PRESSURE: 100 MMHG | WEIGHT: 157.3 LBS | HEART RATE: 58 BPM | OXYGEN SATURATION: 96 % | BODY MASS INDEX: 22.02 KG/M2 | HEIGHT: 71 IN | TEMPERATURE: 98.1 F | RESPIRATION RATE: 16 BRPM

## 2023-04-14 LAB
ANION GAP SERPL CALCULATED.3IONS-SCNC: 11 MMOL/L (ref 3–16)
ANTI-XA UNFRAC HEPARIN: 0.44 IU/ML (ref 0.3–0.7)
BUN SERPL-MCNC: 29 MG/DL (ref 7–20)
CALCIUM SERPL-MCNC: 8.6 MG/DL (ref 8.3–10.6)
CHLORIDE SERPL-SCNC: 96 MMOL/L (ref 99–110)
CO2 SERPL-SCNC: 23 MMOL/L (ref 21–32)
CREAT SERPL-MCNC: 1.2 MG/DL (ref 0.8–1.3)
DEPRECATED RDW RBC AUTO: 14.5 % (ref 12.4–15.4)
GFR SERPLBLD CREATININE-BSD FMLA CKD-EPI: 59 ML/MIN/{1.73_M2}
GLUCOSE BLD-MCNC: 108 MG/DL (ref 70–99)
GLUCOSE BLD-MCNC: 117 MG/DL (ref 70–99)
GLUCOSE BLD-MCNC: 181 MG/DL (ref 70–99)
GLUCOSE SERPL-MCNC: 107 MG/DL (ref 70–99)
HCT VFR BLD AUTO: 27.1 % (ref 40.5–52.5)
HGB BLD-MCNC: 9.5 G/DL (ref 13.5–17.5)
MCH RBC QN AUTO: 33.5 PG (ref 26–34)
MCHC RBC AUTO-ENTMCNC: 34.9 G/DL (ref 31–36)
MCV RBC AUTO: 95.9 FL (ref 80–100)
NT-PROBNP SERPL-MCNC: ABNORMAL PG/ML (ref 0–449)
PERFORMED ON: ABNORMAL
PLATELET # BLD AUTO: 163 K/UL (ref 135–450)
PMV BLD AUTO: 7.5 FL (ref 5–10.5)
POTASSIUM SERPL-SCNC: 4.3 MMOL/L (ref 3.5–5.1)
RBC # BLD AUTO: 2.82 M/UL (ref 4.2–5.9)
SODIUM SERPL-SCNC: 130 MMOL/L (ref 136–145)
WBC # BLD AUTO: 7.3 K/UL (ref 4–11)

## 2023-04-14 PROCEDURE — 2580000003 HC RX 258: Performed by: INTERNAL MEDICINE

## 2023-04-14 PROCEDURE — 6370000000 HC RX 637 (ALT 250 FOR IP): Performed by: INTERNAL MEDICINE

## 2023-04-14 PROCEDURE — 85027 COMPLETE CBC AUTOMATED: CPT

## 2023-04-14 PROCEDURE — 85520 HEPARIN ASSAY: CPT

## 2023-04-14 PROCEDURE — 36415 COLL VENOUS BLD VENIPUNCTURE: CPT

## 2023-04-14 PROCEDURE — 80048 BASIC METABOLIC PNL TOTAL CA: CPT

## 2023-04-14 PROCEDURE — 83880 ASSAY OF NATRIURETIC PEPTIDE: CPT

## 2023-04-14 RX ORDER — SODIUM CHLORIDE 9 MG/ML
INJECTION, SOLUTION INTRAVENOUS PRN
Status: DISCONTINUED | OUTPATIENT
Start: 2023-04-14 | End: 2023-04-14 | Stop reason: HOSPADM

## 2023-04-14 RX ORDER — SODIUM CHLORIDE 0.9 % (FLUSH) 0.9 %
5-40 SYRINGE (ML) INJECTION EVERY 12 HOURS SCHEDULED
Status: DISCONTINUED | OUTPATIENT
Start: 2023-04-14 | End: 2023-04-14 | Stop reason: HOSPADM

## 2023-04-14 RX ORDER — FUROSEMIDE 20 MG/1
20 TABLET ORAL DAILY
Qty: 30 TABLET | Refills: 0 | Status: SHIPPED | OUTPATIENT
Start: 2023-04-14

## 2023-04-14 RX ORDER — SODIUM CHLORIDE 0.9 % (FLUSH) 0.9 %
5-40 SYRINGE (ML) INJECTION PRN
Status: DISCONTINUED | OUTPATIENT
Start: 2023-04-14 | End: 2023-04-14 | Stop reason: HOSPADM

## 2023-04-14 RX ORDER — ASPIRIN 325 MG
325 TABLET ORAL ONCE
Status: COMPLETED | OUTPATIENT
Start: 2023-04-14 | End: 2023-04-14

## 2023-04-14 RX ORDER — LORAZEPAM 0.5 MG/1
0.5 TABLET ORAL
Status: DISCONTINUED | OUTPATIENT
Start: 2023-04-14 | End: 2023-04-14 | Stop reason: HOSPADM

## 2023-04-14 RX ORDER — CARVEDILOL 3.12 MG/1
3.12 TABLET ORAL 2 TIMES DAILY WITH MEALS
Qty: 60 TABLET | Refills: 0 | Status: SHIPPED | OUTPATIENT
Start: 2023-04-14

## 2023-04-14 RX ORDER — ASPIRIN 81 MG/1
81 TABLET, CHEWABLE ORAL DAILY
Qty: 30 TABLET | Refills: 0 | Status: SHIPPED | OUTPATIENT
Start: 2023-04-15

## 2023-04-14 RX ORDER — ONDANSETRON 2 MG/ML
4 INJECTION INTRAMUSCULAR; INTRAVENOUS EVERY 6 HOURS PRN
Status: DISCONTINUED | OUTPATIENT
Start: 2023-04-14 | End: 2023-04-14 | Stop reason: HOSPADM

## 2023-04-14 RX ADMIN — AMLODIPINE BESYLATE 5 MG: 5 TABLET ORAL at 08:31

## 2023-04-14 RX ADMIN — Medication 10 ML: at 08:32

## 2023-04-14 RX ADMIN — CARVEDILOL 3.12 MG: 3.12 TABLET, FILM COATED ORAL at 08:31

## 2023-04-14 RX ADMIN — LISINOPRIL 10 MG: 10 TABLET ORAL at 08:31

## 2023-04-14 RX ADMIN — ASPIRIN 325 MG: 325 TABLET ORAL at 08:31

## 2023-04-14 RX ADMIN — ALOGLIPTIN 6.25 MG: 6.25 TABLET, FILM COATED ORAL at 08:36

## 2023-04-14 RX ADMIN — ATORVASTATIN CALCIUM 10 MG: 10 TABLET, FILM COATED ORAL at 08:31

## 2023-04-14 RX ADMIN — APIXABAN 5 MG: 5 TABLET, FILM COATED ORAL at 10:36

## 2023-04-14 ASSESSMENT — PAIN SCALES - GENERAL
PAINLEVEL_OUTOF10: 0
PAINLEVEL_OUTOF10: 0

## 2023-04-14 NOTE — PROGRESS NOTES
Discharge instructions given/explained, all questions answered, IV and telemetry removed, discharged to home in stable condition with all belongings via private transport.   Alfonso Torres RN  4/14/2023
[facility] - Daily Progress/Follow-up Note      Admit Date:  4/8/2023    CHIEF COMPLAINT  CHF, new A-fib      INTERVAL HISTORY:  Mr. Isaac Harris reports no chest pain, improved shortness of breath since admission and improved lethargy. Denies any other new symptoms. He remains in A-fib based on telemetry but rate controlled. Patient denies chest pain/heaviness/pressure, palpitations, orthopnea, edema, lightheadedness, syncope. REVIEW OF SYSTEMS  Constitutional: there has been no unanticipated weight loss. There's been no change in energy level, sleep pattern, or activity level. Eyes: No visual changes or diplopia. No scleral icterus. ENT: No Headaches, hearing loss or vertigo. No mouth sores or sore throat. Cardiovascular: Reviewed in HPI  Respiratory: + Dyspnea at rest and exertion improved, + mild orthopnea, no cough or wheezing, no sputum production. No hematemesis. Gastrointestinal: No abdominal pain, appetite loss, blood in stools. No change in bowel or bladder habits. Genitourinary: No dysuria, trouble voiding, or hematuria. Musculoskeletal:  No gait disturbance, weakness or joint complaints. Integumentary: No rash or pruritis. Neurological: No headache, diplopia, change in muscle strength, numbness or tingling. No change in gait, balance, coordination, mood, affect, memory, mentation, behavior. Psychiatric: No anxiety, no depression. Endocrine: No malaise, fatigue or temperature intolerance. No excessive thirst, fluid intake, or urination. No tremor. Hematologic/Lymphatic: No abnormal bruising or bleeding, blood clots or swollen lymph nodes. Allergic/Immunologic: No nasal congestion or hives. CURRENT CARDIAC MEDICATIONS  Amlodipine 5 twice daily  Aspirin 81  Lipitor 10  Coreg 3.125 twice daily  IV heparin infusion  Lisinopril 10 daily    Family, medical and social history reviewed and updated as necessary.     VITALS  BP (!) 134/46   Pulse 70   Temp 99 °F (37.2 °C)
Hospitalist Progress Note      PCP: Justin Clancy MD    Date of Admission: 4/8/2023    Chief Complaint: SOB    Hospital Course:   78 y.o. male who presented to [facility] with acute shortness of breath that started 24 hours ago and accompanied by blood-tinged sputum with cough that he noticed earlier today. Decided to come to the emergency department when he saw the blood. Shortness of breath is chronic. Has not significantly resolved since early March when he had an upper respiratory viral illness. Patient states he has advanced aortic valve stenosis and is having some evaluation so he can undergo TAVR at [facility].  Work-up is not completed and he does not know at what stage he is at this point. Felt better with oxygen and diuretics. Had orthopnea at the time of evaluation in the ED but currently able to lay flat. No chest pain. No other accompanying symptoms     Nothing else that makes the patient feel better or worse  When he was short of breath, the shortness of breath got worse with walking a few steps, very mild exertion. Subjective: SOB improved today. No new complaints.        Medications:  Reviewed    Infusion Medications    heparin (PORCINE) Infusion 1,000 Units/hr (04/10/23 1330)    dextrose      sodium chloride       Scheduled Medications    aspirin  81 mg Oral Daily    amLODIPine  5 mg Oral BID    atorvastatin  10 mg Oral Daily    carvedilol  3.125 mg Oral BID WC    alogliptin  6.25 mg Oral Daily    lisinopril  10 mg Oral Daily    sodium chloride flush  5-40 mL IntraVENous 2 times per day    insulin lispro  0-8 Units SubCUTAneous TID WC    insulin lispro  0-4 Units SubCUTAneous Nightly     PRN Meds: heparin (porcine), heparin (porcine), glucose, dextrose bolus **OR** dextrose bolus, glucagon (rDNA), dextrose, sodium chloride flush, sodium chloride, polyethylene glycol, acetaminophen **OR** acetaminophen, potassium chloride **OR** potassium alternative oral
Hospitalist Progress Note      PCP: Michael Harper MD    Date of Admission: 4/8/2023    Chief Complaint: SOB    Hospital Course:   78 y.o. male who presented to [facility] with acute shortness of breath that started 24 hours ago and accompanied by blood-tinged sputum with cough that he noticed earlier today. Decided to come to the emergency department when he saw the blood. Shortness of breath is chronic. Has not significantly resolved since early March when he had an upper respiratory viral illness. Patient states he has advanced aortic valve stenosis and is having some evaluation so he can undergo TAVR at [facility].  Work-up is not completed and he does not know at what stage he is at this point. Felt better with oxygen and diuretics. Had orthopnea at the time of evaluation in the ED but currently able to lay flat. No chest pain. No other accompanying symptoms     Nothing else that makes the patient feel better or worse  When he was short of breath, the shortness of breath got worse with walking a few steps, very mild exertion. Subjective: SOB improved today. No new complaints.        Medications:  Reviewed    Infusion Medications    sodium chloride      heparin (PORCINE) Infusion 1,000 Units/hr (04/11/23 9274)    dextrose      sodium chloride       Scheduled Medications    aspirin  81 mg Oral Daily    amLODIPine  5 mg Oral BID    atorvastatin  10 mg Oral Daily    carvedilol  3.125 mg Oral BID WC    alogliptin  6.25 mg Oral Daily    lisinopril  10 mg Oral Daily    sodium chloride flush  5-40 mL IntraVENous 2 times per day    insulin lispro  0-8 Units SubCUTAneous TID WC    insulin lispro  0-4 Units SubCUTAneous Nightly     PRN Meds: heparin (porcine), heparin (porcine), glucose, dextrose bolus **OR** dextrose bolus, glucagon (rDNA), dextrose, sodium chloride flush, sodium chloride, polyethylene glycol, acetaminophen **OR** acetaminophen, potassium chloride **OR** potassium
Hospitalist Progress Note      PCP: Sydnee Iverson MD    Date of Admission: 4/8/2023    Chief Complaint: SOB    Hospital Course:   78 y.o. male who presented to [facility] with acute shortness of breath that started 24 hours ago and accompanied by blood-tinged sputum with cough that he noticed earlier today. Decided to come to the emergency department when he saw the blood. Shortness of breath is chronic. Has not significantly resolved since early March when he had an upper respiratory viral illness. Patient states he has advanced aortic valve stenosis and is having some evaluation so he can undergo TAVR at [facility].  Work-up is not completed and he does not know at what stage he is at this point. Felt better with oxygen and diuretics. Had orthopnea at the time of evaluation in the ED but currently able to lay flat. No chest pain. No other accompanying symptoms     Nothing else that makes the patient feel better or worse  When he was short of breath, the shortness of breath got worse with walking a few steps, very mild exertion. Subjective: SOB improved today. No new complaints.        Medications:  Reviewed    Infusion Medications    heparin (PORCINE) Infusion 1,140 Units/hr (04/13/23 0727)    dextrose      sodium chloride       Scheduled Medications    aspirin  81 mg Oral Daily    amLODIPine  5 mg Oral BID    atorvastatin  10 mg Oral Daily    carvedilol  3.125 mg Oral BID WC    alogliptin  6.25 mg Oral Daily    lisinopril  10 mg Oral Daily    sodium chloride flush  5-40 mL IntraVENous 2 times per day    insulin lispro  0-8 Units SubCUTAneous TID WC    insulin lispro  0-4 Units SubCUTAneous Nightly     PRN Meds: heparin (porcine), heparin (porcine), glucose, dextrose bolus **OR** dextrose bolus, glucagon (rDNA), dextrose, sodium chloride flush, sodium chloride, polyethylene glycol, acetaminophen **OR** acetaminophen, potassium chloride **OR** potassium alternative oral
Hospitalist Progress Note      PCP: Zara Patino MD    Date of Admission: 4/8/2023    Chief Complaint: SOB    Hospital Course:   78 y.o. male who presented to [facility] with acute shortness of breath that started 24 hours ago and accompanied by blood-tinged sputum with cough that he noticed earlier today. Decided to come to the emergency department when he saw the blood. Shortness of breath is chronic. Has not significantly resolved since early March when he had an upper respiratory viral illness. Patient states he has advanced aortic valve stenosis and is having some evaluation so he can undergo TAVR at [facility].  Work-up is not completed and he does not know at what stage he is at this point. Felt better with oxygen and diuretics. Had orthopnea at the time of evaluation in the ED but currently able to lay flat. No chest pain. No other accompanying symptoms     Nothing else that makes the patient feel better or worse  When he was short of breath, the shortness of breath got worse with walking a few steps, very mild exertion. Subjective: SOB improved today. No new complaints.        Medications:  Reviewed    Infusion Medications    sodium chloride 125 mL/hr at 04/11/23 1209    heparin (PORCINE) Infusion 1,000 Units/hr (04/11/23 0830)    dextrose      sodium chloride       Scheduled Medications    aspirin  81 mg Oral Daily    amLODIPine  5 mg Oral BID    atorvastatin  10 mg Oral Daily    carvedilol  3.125 mg Oral BID WC    alogliptin  6.25 mg Oral Daily    lisinopril  10 mg Oral Daily    sodium chloride flush  5-40 mL IntraVENous 2 times per day    insulin lispro  0-8 Units SubCUTAneous TID WC    insulin lispro  0-4 Units SubCUTAneous Nightly     PRN Meds: heparin (porcine), heparin (porcine), glucose, dextrose bolus **OR** dextrose bolus, glucagon (rDNA), dextrose, sodium chloride flush, sodium chloride, polyethylene glycol, acetaminophen **OR** acetaminophen, potassium
[address]   Daily Progress Note      Admit Date:  4/8/2023    Reason for follow up visit: CHF; New onset atrial fib    CC: \"I feel pretty good today. \"    77 y/o male with PMH significant for CVA, type II diabetes mellitus, HTN, CAD/S/P CABG (2002) and aortic stenosis who was admitted to [facility] with c/o SOB which had been worsening over the 2 weeks prior to admit. He developed a cough productive of blood tinged sputum. In ED he was hypoxic with sats in the 70's and with edema in his lower extremities. He was noted to have CHF and R pleural effusion on CXR. He was diuresed and developed SAE. He has severe aortic stenosis and plan is for eventual TAVR. He is to undergo CTA cardiac and R and L heart cath once his Cr improves. Cardiac cath planned for 4/14. Interval History:  Pt. seen and examined; records reviewed  BP stable. Denies chest pain or SOB  CTA cardiac performed today  Cr 1.3 this AM  Net diuresis -5.4L since admit    Subjective:  Pt with no acute overnight cardiac events. Denies chest pain, SOB, cough, palpitations or dizziness    Objective:   /61   Pulse 73   Temp 98.1 °F (36.7 °C) (Oral)   Resp 16   Ht 5' 11\" (1.803 m)   Wt 157 lb 4.8 oz (71.4 kg)   SpO2 91%   BMI 21.94 kg/m²     Intake/Output Summary (Last 24 hours) at 4/13/2023 0854  Last data filed at 4/13/2023 0442  Gross per 24 hour   Intake 170 ml   Output 800 ml   Net -630 ml     Wt Readings from Last 3 Encounters:   04/13/23 157 lb 4.8 oz (71.4 kg)   03/14/23 162 lb (73.5 kg)   01/24/23 160 lb (72.6 kg)       Physical Exam:  General: In no acute distress. Awake, alert, and oriented X4. Resting in bed. Skin:  Warm and dry. Neck:  Supple. No JVD appreciated. + delayed carotid upstroke  Chest: Lungs with diminished breath sounds bilaterally. No wheezes/rhonchi/rales  Cardiovascular:  irreg; III/VI AS murmur which radiates to the carotids   Abdomen:  soft, nontender, +bowel sounds. Extremities:  No LE edema.  No clubbing
Noted  purplish discoloration, Stage 2 pressure Injury on the coccyx area, sacral mepelex border applied.
Patient returned to room at this time. [facility] notified.
Patient to CT at this time. [id number] notified.
Physician Progress Note      PATIENT:               Viridiana Marin  CSN #:                  [id number]  :                       1940  ADMIT DATE:       2023 10:57 AM  DISCH DATE:  Megha Cortes  PROVIDER #:        Carlos Wyatt MD          QUERY TEXT:    Pt admitted with \"Cardiogenic pulmonary edema\". Cardiology notes- \"Severe   aortic stenosis - Diastolic heart failure. If possible, please document in   progress notes and discharge summary further specificity regarding the type   and acuity of CHF:      The medical record reflects the following:  Risk Factors: severe aortic stenosis and regurg-CAD (coronary artery disease),   Diabetes mellitus ([facility] [address] Hypertension, and Unspecified cerebral artery   occlusion with cerebral infarction  Clinical Indicators: BNP is elevated at 18,900, ED-\"He does appear fluid   overloaded with pitting edema of his bilateral lower extremities. He has   rhonchi bilaterally throughout lung fields. Chest x-ray shows pulmonary   vascular congestion and a right-sided pleural effusion. Troponin is elevated   at 0.13. \" Cards-\"Severe aortic stenosis - likely primary source of symptoms   along with pleural effusion- Symptoms somewhat improved with diuresis. Grade   III diastolic dysfunction with elevated filling pressure\"  Treatment: Cardiology consult, Initially received 20 mg Lasix x 1 dose IV with   > 1L net negative but slight increase in creatinine and BUN.   He received   additional 40 mg IV Lasix yesterday, serial labs, cxr, supportive care, daily   wts, I&O's    Thank-You, Kacy Crawley RN, BSN, CCDS  Options provided:  -- Acute Diastolic CHF/HFpEF  -- Acute on Chronic Diastolic CHF/HFpEF  -- Chronic Diastolic CHF/HFpEF  -- Other - I will add my own diagnosis  -- Disagree - Not applicable / Not valid  -- Disagree - Clinically unable to determine / Unknown  -- Refer to Clinical Documentation Reviewer    PROVIDER RESPONSE TEXT:    This patient has chronic diastolic
RADIOLOGY:  Patient status post ultrasound guided [<right>] sided thoracentesis. Patient tolerated the procedure well. Full report to follow.
Reviewed thora with Dr Lazarus Senters. Heparin drip to be held for 3 hours prior to thora.  Nurse is aware
[facility] Prpgress Note              CC:Hyponatremia  HPI  78-year-old male past medical history of hypertension, diabetes, aortic valve stenosis coronary artery disease who presented to the hospital on 4/8/2023 with complaints of coughing with hemoptysis going on for the last 24 hours before presentation. Patient has a history of shortness of breath since a viral infection a month ago.   Patient was noted to be hyponatremic and nephrology is being consulted for that     Patient sodium was noted to be as low as 122 on admission, hence the nephrology consultation  SUBJECTIVE  Feels fine today, no new c/o  SOC: no visitors    Scheduled Meds:   aspirin  81 mg Oral Daily    amLODIPine  5 mg Oral BID    atorvastatin  10 mg Oral Daily    carvedilol  3.125 mg Oral BID WC    alogliptin  6.25 mg Oral Daily    lisinopril  10 mg Oral Daily    sodium chloride flush  5-40 mL IntraVENous 2 times per day    insulin lispro  0-8 Units SubCUTAneous TID WC    insulin lispro  0-4 Units SubCUTAneous Nightly     Continuous Infusions:   heparin (PORCINE) Infusion 1,000 Units/hr (04/11/23 1454)    dextrose      sodium chloride       PRN Meds:heparin (porcine), heparin (porcine), glucose, dextrose bolus **OR** dextrose bolus, glucagon (rDNA), dextrose, sodium chloride flush, sodium chloride, polyethylene glycol, acetaminophen **OR** acetaminophen, potassium chloride **OR** potassium alternative oral replacement **OR** potassium chloride, magnesium sulfate, prochlorperazine      Objective:      Physical Exam  Wt Readings from Last 3 Encounters:   04/12/23 158 lb 12.8 oz (72 kg)   03/14/23 162 lb (73.5 kg)   01/24/23 160 lb (72.6 kg)     Temp Readings from Last 3 Encounters:   04/12/23 99.3 °F (37.4 °C) (Oral)   01/12/23 98.6 °F (37 °C)   11/17/16 97 °F (36.1 °C) (Temporal)     BP Readings from Last 3 Encounters:   04/12/23 (!) 135/51   03/14/23 (!) 166/84   01/24/23 130/76     Pulse Readings
[facility] Prpgress Note              CC:Hyponatremia  HPI  78-year-old male past medical history of hypertension, diabetes, aortic valve stenosis coronary artery disease who presented to the hospital on 4/8/2023 with complaints of coughing with hemoptysis going on for the last 24 hours before presentation. Patient has a history of shortness of breath since a viral infection a month ago.   Patient was noted to be hyponatremic and nephrology is being consulted for that     Patient sodium was noted to be as low as 122 on admission, hence the nephrology consultation  SUBJECTIVE  Feels fine, no new c/o  SOC: no visitors    Scheduled Meds:   aspirin  81 mg Oral Daily    amLODIPine  5 mg Oral BID    atorvastatin  10 mg Oral Daily    carvedilol  3.125 mg Oral BID WC    alogliptin  6.25 mg Oral Daily    lisinopril  10 mg Oral Daily    sodium chloride flush  5-40 mL IntraVENous 2 times per day    insulin lispro  0-8 Units SubCUTAneous TID WC    insulin lispro  0-4 Units SubCUTAneous Nightly     Continuous Infusions:   heparin (PORCINE) Infusion 1,000 Units/hr (04/10/23 0626)    dextrose      sodium chloride       PRN Meds:heparin (porcine), heparin (porcine), glucose, dextrose bolus **OR** dextrose bolus, glucagon (rDNA), dextrose, sodium chloride flush, sodium chloride, polyethylene glycol, acetaminophen **OR** acetaminophen, potassium chloride **OR** potassium alternative oral replacement **OR** potassium chloride, magnesium sulfate, prochlorperazine      Objective:      Physical Exam  Wt Readings from Last 3 Encounters:   04/10/23 155 lb 14.4 oz (70.7 kg)   03/14/23 162 lb (73.5 kg)   01/24/23 160 lb (72.6 kg)     Temp Readings from Last 3 Encounters:   04/10/23 99 °F (37.2 °C) (Oral)   01/12/23 98.6 °F (37 °C)   11/17/16 97 °F (36.1 °C) (Temporal)     BP Readings from Last 3 Encounters:   04/10/23 (!) 134/46   03/14/23 (!) 166/84   01/24/23 130/76     Pulse Readings from
Encounters:   04/11/23 59   03/14/23 81   01/12/23 74    Gen:  awake, nad    Skin: no rash, turgor wnl  Heent:  eomi, mmm  Neck: supple, No jvd noted   Cardiovascular: Irregular, S1, S2  Respiratory: CTAB , symmetric movement  Abdomen:  soft , non tender   Ext: nontraumatic , no pedal edema  Psychiatric: at baseline  Musculoskeletal:  moving extremities          Lab Review   Lab Results   Component Value Date    WBC 7.0 04/11/2023    HGB 9.5 (L) 04/11/2023    HCT 27.2 (L) 04/11/2023    MCV 95.8 04/11/2023     04/11/2023     Lab Results   Component Value Date/Time     04/11/2023 07:46 AM    K 4.0 04/11/2023 07:46 AM    K 3.6 04/08/2023 05:25 PM    CL 95 04/11/2023 07:46 AM    CO2 27 04/11/2023 07:46 AM    BUN 34 04/11/2023 07:46 AM    CREATININE 1.7 04/11/2023 07:46 AM    GLUCOSE 115 04/11/2023 07:46 AM    CALCIUM 8.5 04/11/2023 07:46 AM            Patient Active Problem List    Diagnosis Date Noted    Conductive hearing loss 01/26/2023    Enlarged prostate 01/26/2023    Cardiac murmur 01/12/2023    Hypertension     CAD (coronary artery disease)     S/P CABG (coronary artery bypass graft) 10/10/2016    Carotid stenosis, bilateral 06/03/2012    Combined systolic and diastolic heart failure ([facility] [address] 06/03/2012    Diabetes mellitus ([facility] [address] 10/07/2010    Hyperlipidemia 10/07/2010    Osteoarthrosis 10/07/2010    Cerebrovascular accident ([facility] [address] 03/05/2010    Severe aortic valve stenosis 04/10/2023    Pulmonary edema cardiac cause ([facility] [address] 04/08/2023       ASSESSMENT AND PLAN        Hyponatremia:   - sodium on presentation 122-slowly improving now     - most likely due to fluid overload. ?  In setting of heart failure   - on po fluid restriction  Encourage PO protein intake. Hypokalemia   supplement as needed     #Acute heart failure exacerbation with pulmonary edema  -improved, holding Lasix.      #Severe aortic stenosis  Plans per primary          # Hypertension :  -Well-controlled
Readings from Last 3 Encounters:   04/13/23 73   03/14/23 81   01/12/23 74    Gen:  awake, nad    Skin: no rash, turgor wnl  Heent:  eomi, mmm  Neck: supple, No jvd noted   Cardiovascular: Irregular, S1, S2  Respiratory: CTAB , symmetric movement  Abdomen:  soft , non tender   Ext: nontraumatic , no pedal edema  Psychiatric: at baseline  Musculoskeletal:  moving extremities          Lab Review   Lab Results   Component Value Date    WBC 6.5 04/13/2023    HGB 9.4 (L) 04/13/2023    HCT 27.1 (L) 04/13/2023    MCV 96.0 04/13/2023     04/13/2023     Lab Results   Component Value Date/Time     04/13/2023 06:06 AM    K 4.3 04/13/2023 06:06 AM    K 3.6 04/08/2023 05:25 PM    CL 97 04/13/2023 06:06 AM    CO2 25 04/13/2023 06:06 AM    BUN 35 04/13/2023 06:06 AM    CREATININE 1.3 04/13/2023 06:06 AM    GLUCOSE 105 04/13/2023 06:06 AM    CALCIUM 8.7 04/13/2023 06:06 AM            Patient Active Problem List    Diagnosis Date Noted    Conductive hearing loss 01/26/2023    Enlarged prostate 01/26/2023    Cardiac murmur 01/12/2023    Hypertension     CAD (coronary artery disease)     S/P CABG (coronary artery bypass graft) 10/10/2016    Carotid stenosis, bilateral 06/03/2012    Combined systolic and diastolic heart failure ([address] 06/03/2012    Diabetes mellitus ([address] 10/07/2010    Hyperlipidemia 10/07/2010    Osteoarthrosis 10/07/2010    Cerebrovascular accident ([address] 03/05/2010    Severe aortic valve stenosis 04/10/2023    Pulmonary edema cardiac cause ([address] 04/08/2023       ASSESSMENT AND PLAN        Hyponatremia:   - sodium on presentation 122-slowly improving now     - most likely due to fluid overload. ?  In setting of heart failure   - on po fluid restriction  Encourage PO protein intake. Hypokalemia   supplement as needed     #Acute heart failure exacerbation with pulmonary edema  -improved, holding Lasix.    -S/P R thoracentesis 4/12    #Severe aortic stenosis  Plans per cardiology (cardiac cath pre
04/08/2023 11:12 AM    LABGLOM 39 04/11/2023 07:46 AM    GLUCOSE 115 04/11/2023 07:46 AM    PROT 6.4 04/08/2023 11:12 AM    CALCIUM 8.5 04/11/2023 07:46 AM    BILITOT 1.0 04/08/2023 11:12 AM    ALKPHOS 103 04/08/2023 11:12 AM    AST 32 04/08/2023 11:12 AM    ALT 42 04/08/2023 11:12 AM     Lab Results   Component Value Date    TROPONINI 0.14 (H) 04/08/2023     Lab Results   Component Value Date    CHOL 85 04/08/2023    CHOL 125 01/24/2023     Lab Results   Component Value Date    TRIG 78 04/08/2023    TRIG 89 01/24/2023     Lab Results   Component Value Date    LDLCALC 22 04/08/2023    [address] 01/24/2023         CARDIAC STUDIES    EKG -atrial fibrillation, left bundle branch block     Echo - 2/2023  Left ventricular systolic function is normal with ejection fraction   estimated at 60-65 %. No regional wall motion abnormalities are noted. There is mild concentric left ventricular hypertrophy. Grade III diastolic dysfunction with elevated filling pressure. Biatrial enlargement. Heavy mitral annular calcification is present. Mild mitral regurgitation and stenosis. The maximal transaortic velocity is 4.87m/s which gives peak pressure   gradient= 95mmHg and mean pressure gradient= 57mmHg which is c/w severe   aortic stenosis. Moderate-to-severe aortic regurgitation is present. Mild tricuspid regurgitation. Normal systolic pulmonary artery pressure (SPAP) estimated at 39 mmHg (RA   pressure 8 mmHg). 6- [address] HAKAN, Severe AS     Chest - x-ray - large right-sided pleural effusion    Stress - n/a     Cath - pending     High complexity/medical decision making due to extensive data review, extensive history review, independent review of data. High risk due to acute illness, evaluation of drug-drug interactions, medication management and diagnostic interventions.         ASSESSMENT & PLAN:     Severe aortic stenosis - likely primary source of symptoms along with pleural
stenosis  Plans per cardiology (cardiac cath pre TAVR)  Had CTA yesterday  Plans for outpatient cardiac cath noted       # Hypertension :  -Well-controlled
regarding thoracentesis  -ok to hold heparin for procedure    6) SAE  -diuretics on hold  -gentle hydration today  -nephrology following    7) Chronic anemia  -iron deficiency + aortic stenosis  -has been on iron before  -has followed with [facility]    Discussed cardiac cath/angiogram/PCI with patient. Discussed indications for procedure, risks and complications (including bleeding, infection, kidney injury, CVA, MI, dissection and death). Verbalizes understanding and agrees to proceed. Will plan to proceed once Cr has improved. Will keep him NPO after midnight for possible cardiac cath tomorrow if Cr improved. Discussed plan with Dr. Sabra Gonsalez and Dr. Chilo Nolan    Time Based Itemization  A total of 50 minutes was spent on today's patient encounter.   If applicable, non-patient-facing activities:  ( x)Preparing to see the patient and reviewing records  ( ) Individual interpretation of results  ( X) Discussion or coordination of care with other health care professionals  ( x) Ordering of unique tests, medications, or procedures  ( x) Documentation within the EHR     MDM: High    Electronically signed by CARL Mitchell CNP on 4/12/2023 at 8:38 AM

## 2023-04-14 NOTE — CONSULTS
Comprehensive Nutrition Assessment    Type and Reason for Visit:  Initial, Consult    Nutrition Recommendations/Plan:   Modify 4 cho/JENNIFER diet to 5 cho/JENNIFER diet  Continue ensure BID and encourage intake  Monitor nutrition adequacy, pertinent labs, bowel habits, wt changes, and clinical progress     Malnutrition Assessment:  Malnutrition Status: At risk for malnutrition (Comment) (weight loss) (04/10/23 1150)    Context:  Acute Illness       Nutrition Assessment:    Consult for CHF education and +IP screen: 79 yo male with PMH DM admitted for pulmonary edema from severe aortic stenosis. Cardiology following for possible TAVR. Pt currently on 4 cho controlled/JENNIFER diet and ensure TID; x4 % PO intakes per EMR. Wts difficult to assess due to limited hx but pt reports being below his usual wt. Pt refused diet education and handouts. Pt reported eating all of his meals and supplements and having an appetite. Will continue to monitor. Nutrition Related Findings:    Na 131. Generalized edema. Wound Type: None       Current Nutrition Intake & Therapies:    Average Meal Intake: %  Average Supplements Intake: %  ADULT ORAL NUTRITION SUPPLEMENT; Breakfast, Lunch, Dinner; Standard High Calorie/High Protein Oral Supplement  ADULT DIET; Regular; 4 carb choices (60 gm/meal); No Added Salt (3-4 gm); 1000 ml    Anthropometric Measures:  Height: 5' 11\" (180.3 cm)  Ideal Body Weight (IBW): 172 lbs (78 kg)       Current Body Weight: 155 lb 13.8 oz (70.7 kg),   IBW.  Weight Source: Standing Scale  Current BMI (kg/m2): 21.7  Usual Body Weight: 160 lb (72.6 kg) (pt reported)  % Weight Change (Calculated): -2.6  Weight Adjustment For: No Adjustment                 BMI Categories: Underweight (BMI less than 22) age over 72    Estimated Daily Nutrient Needs:  Energy Requirements Based On: Kcal/kg (25-30)  Weight Used for Energy Requirements: Ideal  Energy (kcal/day): 8943-5247  Weight Used for Protein Requirements: Ideal
[address] FAILURE PROGRAM      Padmini Davis 1940    History:  Past Medical History:   Diagnosis Date    CAD (coronary artery disease)     Diabetes mellitus ([facility] [address]     Hypertension     Unspecified cerebral artery occlusion with cerebral infarction        ECHO:  2/2/23    EF 60-65%  HgA1C:4/8/23   5.4  Iron Saturation:  4/8/23 23  Ferritin:     ACE/ARB/ARNI: Lisinopril 10 mg daily   BB: Carvedilol 3.125 mg BID  Spironolactone:  Amlodipine 5 mg BID  SGLT2:    Last Hospital Admission:  7/12/12     Discharge plans: Likely Transfer to [facility] for TAVR    Advanced Directives: patient  full code    Chart review completed. Patient a 78year old male, admitted for pleural effusions. Hospital day 6, currently admitted on A2 level of care. Current recommendations are for TAVR. Pt undergoing preop testing and likely transfer to [facility] for surgery. CT scan completed yesterday. Next is cardiac cath today.        Patient recent weights and intake/output reviewed:    Patient Vitals for the past 96 hrs (Last 3 readings):   Weight   04/13/23 0411 157 lb 4.8 oz (71.4 kg)   04/12/23 0441 158 lb 12.8 oz (72 kg)   04/11/23 0404 156 lb 1.6 oz (70.8 kg)         Intake/Output Summary (Last 24 hours) at 4/14/2023 0808  Last data filed at 4/14/2023 0500  Gross per 24 hour   Intake 240 ml   Output 760 ml   Net -520 ml     Education Time: chart review completed    Pierce Roberson RN     4/14/2023 8:08 AM
Mild mitral regurgitation. Left Atrium   The left atrium is severely dilated. Aortic Valve   The aortic valve is thickened/calcified with decreased leaflet mobility   consistent with aortic stenosis. The maximal transaortic velocity is 4.87m/s which gives peak pressure   gradient= 95mmHg and mean pressure gradient= 57mmHg which is c/w severe   aortic stenosis. Moderate-to-severe aortic regurgitation is present. Aorta   The aortic root is normal in size. Right Ventricle   The right ventricle is normal in size. Right ventricular systolic function is bprderline normal.      Tricuspid Valve   Tricuspid valve is structurally normal.   Tricuspid valve leaflet mobility is normal.   Mild tricuspid regurgitation. Normal systolic pulmonary artery pressure (SPAP) estimated at 39 mmHg (RA   pressure 8 mmHg). Right Atrium   The right atrium is mildly dilated. Pulmonic Valve   The pulmonic valve is not well visualized. No evidence of pulmonic valve stenosis. No evidence of pulmonic valve regurgitation. Pericardial Effusion   No pericardial effusion noted. Pleural Effusion   No pleural effusion. Miscellaneous   The IVC is normal (< = 2.1 cm) and collapses > 50% with respiration   consistent with normal RA pressure (3 mmHg). No obvious masses, thrombi, or vegetations are noted.      M-Mode/2D Measurements (cm)      LV Diastolic Dimension: 0.34 cm LV Systolic Dimension: 1.82 cm   LV Septum Diastolic: 6.34 cm   LV PW Diastolic: 5.78 cm        AO Root Dimension: 3.5 cm                                      LA Area: 29.8 cm2   LVOT: 2.1 cm                    LA volume/Index: 102 ml /59 ml/m2     Doppler Measurements      AV Peak Velocity: 487 cm/s     MV Peak E-Wave: 124 cm/s   AV Peak Gradient: 94.87 mmHg   MV Peak A-Wave: 56.7 cm/s   AV Mean Gradient: 57 mmHg      MV E/A Ratio: 2.19   LVOT Peak Velocity: 98.6 cm/s  MV Mean Gradient: 5 mmHg   AV Area (Continuity):0.7 cm2   MV

## 2023-04-14 NOTE — PLAN OF CARE
Problem: Chronic Conditions and Co-morbidities  Goal: Patient's chronic conditions and co-morbidity symptoms are monitored and maintained or improved  Outcome: Progressing     Patient's EF (Ejection Fraction) is greater than 40%    Heart Failure Medications:  Diuretics[de-identified] None    (One of the following REQUIRED for EF </= 40%/SYSTOLIC FAILURE but MAY be used in EF% >40%/DIASTOLIC FAILURE)        ACE[de-identified] Lisinopril        ARB[de-identified] None         ARNI[de-identified] None    (Beta Blockers)  NON- Evidenced Based Beta Blocker (for EF% >40%/DIASTOLIC FAILURE): None    Evidenced Based Beta Blocker::(REQUIRED for EF% <40%/SYSTOLIC FAILURE) Carvedilol- Coreg  . .................................................................................................................................................. Patient's weights and intake/output reviewed: Yes    Patient's Last Weight: 157 lbs obtained by standing scale. Difference of 1 lbs less than last documented weight. Intake/Output Summary (Last 24 hours) at 4/14/2023 0640  Last data filed at 4/14/2023 0500  Gross per 24 hour   Intake 240 ml   Output 760 ml   Net -520 ml       Daily Weight log at bedside and being used: Vortal Provided: yes    Comorbidities Reviewed Yes    Patient has a past medical history of CAD (coronary artery disease), Diabetes mellitus ([address] Hypertension, and Unspecified cerebral artery occlusion with cerebral infarction. >>For CHF and Comorbidity documentation on Education Time and Topics, please see Education Tab    Progressive Mobility Assessment:  What is this patient's Current Level of Mobility?: Ambulatory- with Assistance  How was this patient Mobilized today?: Edge of Bed, Up to Chair, Bedside Commode,  Up to Toilet/Shower, and Up in Room, ambulated 10 ft                 With Whom? Nurse, PCA, and Self                 Level of Difficulty/Assistance: 1x Assist     Pt resting in bed at this time on room air.  Pt denies shortness of

## 2023-04-14 NOTE — PLAN OF CARE
Problem: Safety - Adult  Goal: Free from fall injury  4/13/2023 2230 by Candelario Waggoner RN  Outcome: Progressing  Flowsheets (Taken 4/13/2023 2230)  Free From Fall Injury:   Instruct family/caregiver on patient safety   Based on caregiver fall risk screen, instruct family/caregiver to ask for assistance with transferring infant if caregiver noted to have fall risk factors

## 2023-04-14 NOTE — DISCHARGE SUMMARY
Hospital Medicine Discharge Summary    Patient ID: Lovina Cranker      Patient's PCP: Daljit Gutierrez MD    Admit Date: 4/8/2023     Discharge Date: 4/14/2023      Admitting Provider: Yancy Deshpande MD     Discharge Provider: Raj Call MD     Discharge Diagnoses: Active Hospital Problems    Diagnosis     Severe aortic valve stenosis [I35.0]     Pulmonary edema cardiac cause (HCC) [I50.1]        The patient was seen and examined on day of discharge and this discharge summary is in conjunction with any daily progress note from day of discharge. Hospital Course:   78 y.o. male who presented to [facility] with acute shortness of breath that started 24 hours ago and accompanied by blood-tinged sputum with cough that he noticed earlier today. Decided to come to the emergency department when he saw the blood. Shortness of breath is chronic. Has not significantly resolved since early March when he had an upper respiratory viral illness. Patient states he has advanced aortic valve stenosis and is having some evaluation so he can undergo TAVR at [facility].  Work-up is not completed and he does not know at what stage he is at this point. Felt better with oxygen and diuretics. Had orthopnea at the time of evaluation in the ED but currently able to lay flat. No chest pain. No other accompanying symptoms     Nothing else that makes the patient feel better or worse  When he was short of breath, the shortness of breath got worse with walking a few steps, very mild exertion.     Pulmonary edema 2/2 aortic stenosis  - cardiology consulted  - CT surgery consulted  - CTA completed  - plan for [facility] as outpatient prior to TAVR  - s/p thoracentesis with 1.7L removed     Acute hypoxic respiratory failure  - 2/2 above  - weaned to room air     SAE  - 2/2 diuresis  - nephrology consulted  - improved with gentle diuresis  - continue to monitor     Hyponatremia  - 2/2 hypervolemia  - nephrology

## 2023-04-14 NOTE — CARE COORDINATION
CASE MANAGEMENT DISCHARGE SUMMARY      Discharge to: home with family, denies needs, per RN patient d/c home no transfer to [facility]    Precertification completed: [facility] Exemption Notification (HENS) completed: NA    IMM given: 4/14/23     New Durable Medical Equipment ordered/agency: NA    Transportation:    Family/car: family       Confirmed discharge plan with:     Patient: yes     Family:  yes, per patient          RN, name: Mendoza    Note: Discharging nurse to complete HILARIA, reconcile AVS, and place final copy with patient's discharge packet. RN to ensure that written prescriptions for  Level II medications are sent with patient to the facility as per protocol.

## 2023-04-17 ENCOUNTER — FOLLOWUP TELEPHONE ENCOUNTER (OUTPATIENT)
Dept: TELEMETRY | Age: 83
End: 2023-04-17

## 2023-04-17 NOTE — TELEPHONE ENCOUNTER
Care Transitions Initial Follow Up Call    Call within 2 business days of discharge: Yes     Patient: Vivian Avery Patient : 1940 MRN: [id number]    [unfilled]    RARS: Readmission Risk Score: 14.7       Spoke with: patient    Discharge department/facility: home    Non-face-to-face services provided:  Scheduled appointment with PCP-7day    Spoke to patient for hospital follow up. Pt states he is doing well since returning home however he is attempting to schedule cardiac cath that he needs to have prior to TAVR procedure. States that he would prefer the cath be doen at [facility]. He has placed call to Cardiology and is awaiting returned call.       Follow Up  Future Appointments   Date Time Provider Margo Nichols   2023  1:00 PM Manuel Meyers MD [facility] DALTON Spivey RN

## 2023-04-25 ENCOUNTER — HOSPITAL ENCOUNTER (OUTPATIENT)
Dept: CARDIAC CATH/INVASIVE PROCEDURES | Age: 83
Discharge: HOME | End: 2023-04-25
Attending: INTERNAL MEDICINE | Admitting: INTERNAL MEDICINE
Payer: MEDICARE

## 2023-04-25 VITALS — HEIGHT: 71 IN | BODY MASS INDEX: 20.86 KG/M2 | WEIGHT: 149 LBS

## 2023-04-25 PROBLEM — I35.0 SEVERE AORTIC STENOSIS: Status: ACTIVE | Noted: 2023-04-25

## 2023-04-25 PROBLEM — Z01.818 PRE-OP EVALUATION: Status: ACTIVE | Noted: 2023-04-25

## 2023-04-25 LAB
ANION GAP SERPL CALCULATED.3IONS-SCNC: 11 MMOL/L (ref 3–16)
BUN SERPL-MCNC: 38 MG/DL (ref 7–20)
CALCIUM SERPL-MCNC: 9.5 MG/DL (ref 8.3–10.6)
CHLORIDE SERPL-SCNC: 100 MMOL/L (ref 99–110)
CO2 SERPL-SCNC: 27 MMOL/L (ref 21–32)
CREAT SERPL-MCNC: 1.3 MG/DL (ref 0.8–1.3)
DEPRECATED RDW RBC AUTO: 14.1 % (ref 12.4–15.4)
EKG ATRIAL RATE: 37 BPM
EKG DIAGNOSIS: NORMAL
EKG Q-T INTERVAL: 460 MS
EKG QRS DURATION: 142 MS
EKG QTC CALCULATION (BAZETT): 496 MS
EKG R AXIS: -27 DEGREES
EKG T AXIS: 114 DEGREES
EKG VENTRICULAR RATE: 70 BPM
GFR SERPLBLD CREATININE-BSD FMLA CKD-EPI: 54 ML/MIN/{1.73_M2}
GLUCOSE SERPL-MCNC: 108 MG/DL (ref 70–99)
HCT VFR BLD AUTO: 33.3 % (ref 40.5–52.5)
HGB BLD-MCNC: 11.2 G/DL (ref 13.5–17.5)
MCH RBC QN AUTO: 32.5 PG (ref 26–34)
MCHC RBC AUTO-ENTMCNC: 33.6 G/DL (ref 31–36)
MCV RBC AUTO: 96.7 FL (ref 80–100)
PLATELET # BLD AUTO: 203 K/UL (ref 135–450)
PMV BLD AUTO: 7.3 FL (ref 5–10.5)
POTASSIUM SERPL-SCNC: 3.9 MMOL/L (ref 3.5–5.1)
RBC # BLD AUTO: 3.44 M/UL (ref 4.2–5.9)
SODIUM SERPL-SCNC: 138 MMOL/L (ref 136–145)
WBC # BLD AUTO: 7.3 K/UL (ref 4–11)

## 2023-04-25 PROCEDURE — 2709999900 HC NON-CHARGEABLE SUPPLY

## 2023-04-25 PROCEDURE — 6360000002 HC RX W HCPCS

## 2023-04-25 PROCEDURE — C1887 CATHETER, GUIDING: HCPCS

## 2023-04-25 PROCEDURE — 6370000000 HC RX 637 (ALT 250 FOR IP)

## 2023-04-25 PROCEDURE — 99999 PR OFFICE/OUTPT VISIT,PROCEDURE ONLY: CPT | Performed by: INTERNAL MEDICINE

## 2023-04-25 PROCEDURE — 93005 ELECTROCARDIOGRAM TRACING: CPT | Performed by: INTERNAL MEDICINE

## 2023-04-25 PROCEDURE — 6360000004 HC RX CONTRAST MEDICATION

## 2023-04-25 PROCEDURE — 93457 R HRT ART/GRFT ANGIO: CPT

## 2023-04-25 PROCEDURE — 80048 BASIC METABOLIC PNL TOTAL CA: CPT

## 2023-04-25 PROCEDURE — 85027 COMPLETE CBC AUTOMATED: CPT

## 2023-04-25 PROCEDURE — C1769 GUIDE WIRE: HCPCS

## 2023-04-25 PROCEDURE — C1894 INTRO/SHEATH, NON-LASER: HCPCS

## 2023-04-25 PROCEDURE — 2500000003 HC RX 250 WO HCPCS

## 2023-04-25 PROCEDURE — 80061 LIPID PANEL: CPT

## 2023-04-25 RX ORDER — FENTANYL CITRATE 50 UG/ML
INJECTION, SOLUTION INTRAMUSCULAR; INTRAVENOUS
Status: COMPLETED | OUTPATIENT
Start: 2023-04-25 | End: 2023-04-25

## 2023-04-25 RX ORDER — ATORVASTATIN CALCIUM 20 MG/1
20 TABLET, FILM COATED ORAL DAILY
Qty: 90 TABLET | Refills: 3 | Status: SHIPPED | OUTPATIENT
Start: 2023-04-25

## 2023-04-25 RX ORDER — ONDANSETRON 2 MG/ML
4 INJECTION INTRAMUSCULAR; INTRAVENOUS EVERY 6 HOURS PRN
Status: DISCONTINUED | OUTPATIENT
Start: 2023-04-25 | End: 2023-04-25 | Stop reason: HOSPADM

## 2023-04-25 RX ORDER — LORAZEPAM 0.5 MG/1
0.5 TABLET ORAL
Status: DISCONTINUED | OUTPATIENT
Start: 2023-04-25 | End: 2023-04-25 | Stop reason: HOSPADM

## 2023-04-25 RX ORDER — ASPIRIN 325 MG
325 TABLET ORAL ONCE
Status: DISCONTINUED | OUTPATIENT
Start: 2023-04-25 | End: 2023-04-25

## 2023-04-25 RX ORDER — ACETAMINOPHEN 325 MG/1
650 TABLET ORAL EVERY 4 HOURS PRN
Status: DISCONTINUED | OUTPATIENT
Start: 2023-04-25 | End: 2023-04-25 | Stop reason: HOSPADM

## 2023-04-25 RX ORDER — SODIUM CHLORIDE 9 MG/ML
INJECTION, SOLUTION INTRAVENOUS PRN
Status: DISCONTINUED | OUTPATIENT
Start: 2023-04-25 | End: 2023-04-25 | Stop reason: HOSPADM

## 2023-04-25 RX ORDER — ASPIRIN 81 MG/1
243 TABLET, CHEWABLE ORAL ONCE
Status: DISCONTINUED | OUTPATIENT
Start: 2023-04-25 | End: 2023-04-25

## 2023-04-25 RX ORDER — SODIUM CHLORIDE 0.9 % (FLUSH) 0.9 %
5-40 SYRINGE (ML) INJECTION EVERY 12 HOURS SCHEDULED
Status: DISCONTINUED | OUTPATIENT
Start: 2023-04-25 | End: 2023-04-25 | Stop reason: HOSPADM

## 2023-04-25 RX ORDER — MIDAZOLAM HYDROCHLORIDE 1 MG/ML
INJECTION INTRAMUSCULAR; INTRAVENOUS
Status: COMPLETED | OUTPATIENT
Start: 2023-04-25 | End: 2023-04-25

## 2023-04-25 RX ORDER — SODIUM CHLORIDE 0.9 % (FLUSH) 0.9 %
5-40 SYRINGE (ML) INJECTION PRN
Status: DISCONTINUED | OUTPATIENT
Start: 2023-04-25 | End: 2023-04-25 | Stop reason: HOSPADM

## 2023-04-25 RX ORDER — ASPIRIN 81 MG/1
81 TABLET, CHEWABLE ORAL ONCE
Status: COMPLETED | OUTPATIENT
Start: 2023-04-25 | End: 2023-04-25

## 2023-04-25 RX ORDER — SODIUM CHLORIDE 9 MG/ML
INJECTION, SOLUTION INTRAVENOUS CONTINUOUS
Status: ACTIVE | OUTPATIENT
Start: 2023-04-25 | End: 2023-04-25

## 2023-04-25 RX ORDER — ATORVASTATIN CALCIUM 40 MG/1
40 TABLET, FILM COATED ORAL DAILY
Qty: 90 TABLET | Refills: 3 | Status: SHIPPED | OUTPATIENT
Start: 2023-04-25 | End: 2023-04-25 | Stop reason: SDUPTHER

## 2023-04-25 RX ADMIN — ASPIRIN 81 MG: 81 TABLET, CHEWABLE ORAL at 12:29

## 2023-04-25 RX ADMIN — MIDAZOLAM HYDROCHLORIDE 0.5 MG: 1 INJECTION INTRAMUSCULAR; INTRAVENOUS at 13:28

## 2023-04-25 RX ADMIN — FENTANYL CITRATE 12.5 MCG: 50 INJECTION, SOLUTION INTRAMUSCULAR; INTRAVENOUS at 12:57

## 2023-04-25 RX ADMIN — SODIUM CHLORIDE: 9 INJECTION, SOLUTION INTRAVENOUS at 15:16

## 2023-04-25 RX ADMIN — FENTANYL CITRATE 25 MCG: 50 INJECTION, SOLUTION INTRAMUSCULAR; INTRAVENOUS at 14:22

## 2023-04-25 RX ADMIN — Medication 10 ML: at 12:27

## 2023-04-25 RX ADMIN — MIDAZOLAM HYDROCHLORIDE 0.5 MG: 1 INJECTION INTRAMUSCULAR; INTRAVENOUS at 12:57

## 2023-04-25 RX ADMIN — FENTANYL CITRATE 25 MCG: 50 INJECTION, SOLUTION INTRAMUSCULAR; INTRAVENOUS at 14:10

## 2023-04-25 RX ADMIN — FENTANYL CITRATE 12.5 MCG: 50 INJECTION, SOLUTION INTRAMUSCULAR; INTRAVENOUS at 13:28

## 2023-04-25 NOTE — H&P
Brief Pre-Op Note/Sedation Assessment      Dat Zuleta  1940  [id number]  5:36 PM    Planned Procedure: Cardiac Catheterization Procedure  Post Procedure Plan: Return to same level of care  Consent: I have discussed with the patient and/or the patient representative the indication, alternatives, and the possible risks and/or complications of the planned procedure and the anesthesia methods. The patient and/or patient representative appear to understand and agree to proceed. Chief Complaint:   Severe aortic stenosis, pre-operative evaluation    Indications for Cath Procedure:  Presentation:  Valvular Disease - Aortic Stenosis; Stenosis Severity: Severe and Pre-Operative Evaluation - Surgery Type: Cardiac Surgery, Functional Capacity: < 4 METS, Surgical Risk: Intermediate, Solid Organ Transplant Surgery:  No  2. Anginal Classification within 2 weeks:  No symptoms  3. Angina Symptoms Assessment:  Asymptomatic  4. Heart Failure Class within last 2 weeks:  Yes:  Heart Failure Type: Diastolic Severity:  Class III - Symptoms of HF on less-than-ordinary exertion  5. Cardiovascular Instability:  No    Prior Ischemic Workup/Eval:  Pre-Procedural Medications: Yes: ACE/ARB/ARNI, Aspirin, Beta Blockers, Ca Channel Blockers, and STATIN  2. Stress Test Completed? No    Does Patient need surgery? Cath Valve Surgery:  Yes:  Aortic Stenosis; Stenosis Severity: Severe Intermediate < 4 METS    Pre-Procedure Medical History:  Vital Signs:  Ht 5' 11\" (1.803 m)   Wt 149 lb (67.6 kg)   BMI 20.78 kg/m²     Allergies:     Allergies   Allergen Reactions    Isosorbide Nitrate Other (See Comments)     Headache    Norvasc [Amlodipine] Rash     Medications:    Current Facility-Administered Medications   Medication Dose Route Frequency Provider Last Rate Last Admin    sodium chloride flush 0.9 % injection 5-40 mL  5-40 mL IntraVENous 2 times per day Kevin Prather MD   10 mL at 04/25/23 1227    sodium chloride flush 0.9 %

## 2023-04-25 NOTE — PROCEDURES
CARDIAC CATHETERIZATION REPORT    Date of Procedure: 5/25/2023  : Jeffrey Mclaughlin DO  Primary Indication: Severe aortic stenosis, pre-operative evaluation    Procedures Performed:  1. Coronary angiography  2. Left subclavian angiography  3. LIMA angiography  4. Saphenous vein graft angiography  5. Right heart catheterization  6. Ultrasound-guided right femoral artery access  7. Ultrasound-guided right femoral vein access  8. Right femoral angiography  9. Moderate conscious sedation    Procedural Details:  Access: Local anesthetic was given and access was obtained in the right femoral artery using a micropuncture technique and ultrasound guidance and a 6F sheath was placed without difficulty. Access was also obtained in the right femoral vein using a micropuncture technique and ultrasound guidance and a 5F sheath was placed without difficulty. Diagnostic: A 5F De Jesus Alexander catheter was used to perform the right heart catheterization. 5F JR4 and 5F JL4 catheters were used to perform selective right and left coronary angiography, respectively. The 5F JR4 catheter was used to engage the SVG to diagonal and SVG to OM. A 5F AR1 catheter was used to engage the SVG to RPDA. The 5F JR4 catheter was also used to perform left subclavian angiography. A 5F ROLAND catheter was used to perform LIMA angiography. Hemostasis: At the end of the procedure, the right femoral arterial and right femoral venous sheaths were removed and manual pressure applied to maintain hemostasis. Findings:  Hemodynamics:     A.  Opening arterial pressure: 158/50 (81) mmHg                   1. RA: 7 mmHg                   2. RV: 50/7 mmHg                   3. PA: 50/18 (22) mmHg                   4. PCWP: 15 mmHg                   5. Saturations: AO 92%, RA 65%, PA 63%                   6. Luther CO: 6.2 L/min                   7. Luther CI: 3.23 L/min*m2                   8. Luther SVR: 955 dyne*sec/cm5                   9. Luther PVR: 219

## 2023-04-26 NOTE — DISCHARGE INSTRUCTIONS
Cath Lab at  [facility]    Discharge Instructions        4/25/2023  Zhanna Gamino   Date of Birth 1940     Activity:  No driving for 24 hours. No Tub baths, swimming or hot tubs for 5 days. In 24 hours you may remove dressing and shower. Wash site with soap and water and leave open to air  No lotions, powders or ointments near site for 5 days. No lifting over 5 pounds for 5 days. Return to work/school in 5 days unless instructed otherwise by your doctor. Diet:  Resume previous diet unless instructed otherwise by your doctor, if so general guidelines for a cardiac diet will be provided to you. Drink extra non-alcoholic/decaffienated fluids for first 24 hours after your procedure. Groin Management:  If you have stairs to walk up, pretend you have a cast on the affected leg walk up them without bending affected leg. When you go home go to the bed or sofa, do not get up except to go to the bathroom. Avoid strenuous activity for 5 days. For example, lifting heavy objects greater than 10 lbs, bicycling, jogging, climbing stairs, or walking long distances. If bleeding occurs from the site or a hematoma (lump), begins to increase in size, apply pressure directly over the site and call 911 to return to the hospital.     Special Instructions:  Report any coolness or numbness in the leg where the cath was done to the MD or call 911  Report any chills, fever, itching, red bumps or rash. Report any of the following to the MD: drainage from the cath site, redness and/or swelling at the site, increased tenderness at the site. If you are currently taking Metformin or Metformin combination medications for Diabetes, hold your dose for 48 hours after your procedure. Do not stop taking Plavix, Brilinta or Effient, without first consulting your cardiologist.      Sedation Discharge Instructions:   For the next 24 hours do not drive a car, operate machinery, power tools or

## 2023-04-27 ENCOUNTER — TELEPHONE (OUTPATIENT)
Dept: CARDIOLOGY CLINIC | Age: 83
End: 2023-04-27

## 2023-04-27 LAB
CHOLEST SERPL-MCNC: 119 MG/DL (ref 0–199)
HDLC SERPL-MCNC: 79 MG/DL (ref 40–60)
LDLC SERPL CALC-MCNC: 30 MG/DL
TRIGL SERPL-MCNC: 49 MG/DL (ref 0–150)
VLDLC SERPL CALC-MCNC: 10 MG/DL

## 2023-04-27 NOTE — TELEPHONE ENCOUNTER
----- Message from Renaldo Kowalski MD sent at 4/23/2023 12:53 PM EDT -----  Let patient know their lab tests are abnl, rec: f/u w/ pcp for this. Thanks.

## 2023-05-08 ENCOUNTER — TELEPHONE (OUTPATIENT)
Dept: CARDIOLOGY CLINIC | Age: 83
End: 2023-05-08

## 2023-05-08 NOTE — TELEPHONE ENCOUNTER
Pt states that he needs to have several teeth pulled. States he will call me with the date so that we can schedule his TAVR procedure once completed.  Kimberly CARO

## 2023-05-25 PROBLEM — Z01.818 PRE-OP EVALUATION: Status: RESOLVED | Noted: 2023-04-25 | Resolved: 2023-05-25

## 2023-05-30 ENCOUNTER — TELEPHONE (OUTPATIENT)
Dept: CARDIOLOGY | Age: 83
End: 2023-05-30

## 2023-05-30 NOTE — TELEPHONE ENCOUNTER
Pt returned call and does not have date set for dental work. He states he will call me once date is scheduled.  Kimberly CARO

## 2023-06-05 ENCOUNTER — TELEPHONE (OUTPATIENT)
Dept: CARDIOLOGY | Age: 83
End: 2023-06-05

## 2023-08-01 ENCOUNTER — TELEPHONE (OUTPATIENT)
Dept: CARDIOLOGY CLINIC | Age: 83
End: 2023-08-01

## 2023-08-01 NOTE — TELEPHONE ENCOUNTER
Pt called stating he has had all of his teeth pulled and is ready for TAVR.  Aware that he is scheduled for TAVR on 8/29/23 with PAT on 8/17/23 at 9:45am. [facility] RN

## 2023-08-14 NOTE — PROGRESS NOTES
FELECIA SANTIAGO   CHF Meds     ACE/ARB/ARNI, ASA, CCB, STATIN   TTE 2/23  65% AS MG 57, Mod-severe AI   Plan     TAVR soon   PAT today    *CAD    Date EF Results   Sx     As above   Hx 2005   CABG   [facility] 2009 4/23   Patient LIMA-mid LAD, SVG-RPDA, SVG-RPL1, SVG-OM1,  Natives 100%, L-LAD ok, S-OM 70%, S-D ok, S-RCA ok ([place])   MPI 7/12 6/13    49% (STTE) negative  Fixed defect c/w scar   Plan     Continue aggressive medical treatment at doses above   *HTN  Status Advice Plan   Controlled Diet/salt/xcise/wt counseling Continue meds at doses above   *FOLLOWUP  After TAVR

## 2023-08-17 ENCOUNTER — HOSPITAL ENCOUNTER (OUTPATIENT)
Dept: PREADMISSION TESTING | Age: 83
Discharge: HOME | End: 2023-08-17
Payer: MEDICARE

## 2023-08-17 ENCOUNTER — OFFICE VISIT (OUTPATIENT)
Dept: CARDIOLOGY CLINIC | Age: 83
End: 2023-08-17
Payer: MEDICARE

## 2023-08-17 ENCOUNTER — ANESTHESIA EVENT (OUTPATIENT)
Dept: OPERATING ROOM | Age: 83
DRG: 267 | End: 2023-08-17
Payer: MEDICARE

## 2023-08-17 VITALS
DIASTOLIC BLOOD PRESSURE: 58 MMHG | HEART RATE: 73 BPM | WEIGHT: 146 LBS | TEMPERATURE: 97.9 F | BODY MASS INDEX: 20.44 KG/M2 | HEIGHT: 71 IN | SYSTOLIC BLOOD PRESSURE: 137 MMHG | RESPIRATION RATE: 16 BRPM

## 2023-08-17 VITALS
OXYGEN SATURATION: 99 % | DIASTOLIC BLOOD PRESSURE: 70 MMHG | HEIGHT: 71 IN | HEART RATE: 64 BPM | WEIGHT: 146.6 LBS | BODY MASS INDEX: 20.52 KG/M2 | SYSTOLIC BLOOD PRESSURE: 130 MMHG

## 2023-08-17 DIAGNOSIS — I25.10 CORONARY ARTERY DISEASE DUE TO LIPID RICH PLAQUE: ICD-10-CM

## 2023-08-17 DIAGNOSIS — I10 ESSENTIAL HYPERTENSION: ICD-10-CM

## 2023-08-17 DIAGNOSIS — I35.0 AORTIC VALVE STENOSIS, NONRHEUMATIC: Primary | ICD-10-CM

## 2023-08-17 DIAGNOSIS — I25.83 CORONARY ARTERY DISEASE DUE TO LIPID RICH PLAQUE: ICD-10-CM

## 2023-08-17 LAB
ABO + RH BLD: NORMAL
ALBUMIN SERPL-MCNC: 4.8 G/DL (ref 3.4–5)
ALP SERPL-CCNC: 86 U/L (ref 40–129)
ALT SERPL-CCNC: 19 U/L (ref 10–40)
ANION GAP SERPL CALCULATED.3IONS-SCNC: 10 MMOL/L (ref 3–16)
AST SERPL-CCNC: 25 U/L (ref 15–37)
BACTERIA URNS QL MICRO: NORMAL /HPF
BASOPHILS # BLD: 0.1 K/UL (ref 0–0.2)
BASOPHILS NFR BLD: 1.3 %
BILIRUB DIRECT SERPL-MCNC: <0.2 MG/DL (ref 0–0.3)
BILIRUB INDIRECT SERPL-MCNC: NORMAL MG/DL (ref 0–1)
BILIRUB SERPL-MCNC: 0.6 MG/DL (ref 0–1)
BILIRUB UR QL STRIP.AUTO: NEGATIVE
BLD GP AB SCN SERPL QL: NORMAL
BUN SERPL-MCNC: 31 MG/DL (ref 7–20)
CALCIUM SERPL-MCNC: 10.1 MG/DL (ref 8.3–10.6)
CHLORIDE SERPL-SCNC: 101 MMOL/L (ref 99–110)
CLARITY UR: CLEAR
CO2 SERPL-SCNC: 26 MMOL/L (ref 21–32)
COLOR UR: YELLOW
CREAT SERPL-MCNC: 1.6 MG/DL (ref 0.8–1.3)
DEPRECATED RDW RBC AUTO: 14 % (ref 12.4–15.4)
EKG ATRIAL RATE: 69 BPM
EKG DIAGNOSIS: NORMAL
EKG P-R INTERVAL: 400 MS
EKG Q-T INTERVAL: 462 MS
EKG QRS DURATION: 140 MS
EKG QTC CALCULATION (BAZETT): 495 MS
EKG R AXIS: -30 DEGREES
EKG T AXIS: 116 DEGREES
EKG VENTRICULAR RATE: 69 BPM
EOSINOPHIL # BLD: 0.4 K/UL (ref 0–0.6)
EOSINOPHIL NFR BLD: 5.8 %
EPI CELLS #/AREA URNS AUTO: 0 /HPF (ref 0–5)
GFR SERPLBLD CREATININE-BSD FMLA CKD-EPI: 42 ML/MIN/{1.73_M2}
GLUCOSE SERPL-MCNC: 120 MG/DL (ref 70–99)
GLUCOSE UR STRIP.AUTO-MCNC: 250 MG/DL
HCT VFR BLD AUTO: 33.5 % (ref 40.5–52.5)
HGB BLD-MCNC: 11.1 G/DL (ref 13.5–17.5)
HGB UR QL STRIP.AUTO: NEGATIVE
HYALINE CASTS #/AREA URNS AUTO: 0 /LPF (ref 0–8)
INR PPP: 1.27 (ref 0.84–1.16)
KETONES UR STRIP.AUTO-MCNC: NEGATIVE MG/DL
LEUKOCYTE ESTERASE UR QL STRIP.AUTO: NEGATIVE
LYMPHOCYTES # BLD: 2 K/UL (ref 1–5.1)
LYMPHOCYTES NFR BLD: 30.9 %
MAGNESIUM SERPL-MCNC: 2.3 MG/DL (ref 1.8–2.4)
MCH RBC QN AUTO: 32.8 PG (ref 26–34)
MCHC RBC AUTO-ENTMCNC: 33.2 G/DL (ref 31–36)
MCV RBC AUTO: 98.9 FL (ref 80–100)
MONOCYTES # BLD: 0.7 K/UL (ref 0–1.3)
MONOCYTES NFR BLD: 10 %
NEUTROPHILS # BLD: 3.4 K/UL (ref 1.7–7.7)
NEUTROPHILS NFR BLD: 52 %
NITRITE UR QL STRIP.AUTO: NEGATIVE
PH UR STRIP.AUTO: 6.5 [PH] (ref 5–8)
PHOSPHATE SERPL-MCNC: 3.7 MG/DL (ref 2.5–4.9)
PLATELET # BLD AUTO: 186 K/UL (ref 135–450)
PMV BLD AUTO: 7.8 FL (ref 5–10.5)
POTASSIUM SERPL-SCNC: 4 MMOL/L (ref 3.5–5.1)
PROT SERPL-MCNC: 7.4 G/DL (ref 6.4–8.2)
PROT UR STRIP.AUTO-MCNC: ABNORMAL MG/DL
PROTHROMBIN TIME: 15.9 SEC (ref 11.5–14.8)
RBC # BLD AUTO: 3.38 M/UL (ref 4.2–5.9)
RBC CLUMPS #/AREA URNS AUTO: 0 /HPF (ref 0–4)
SODIUM SERPL-SCNC: 137 MMOL/L (ref 136–145)
SP GR UR STRIP.AUTO: 1.01 (ref 1–1.03)
UA COMPLETE W REFLEX CULTURE PNL UR: ABNORMAL
UA DIPSTICK W REFLEX MICRO PNL UR: YES
URN SPEC COLLECT METH UR: ABNORMAL
UROBILINOGEN UR STRIP-ACNC: 0.2 E.U./DL
WBC # BLD AUTO: 6.5 K/UL (ref 4–11)
WBC #/AREA URNS AUTO: 0 /HPF (ref 0–5)

## 2023-08-17 PROCEDURE — 85610 PROTHROMBIN TIME: CPT

## 2023-08-17 PROCEDURE — 87086 URINE CULTURE/COLONY COUNT: CPT

## 2023-08-17 PROCEDURE — G8427 DOCREV CUR MEDS BY ELIG CLIN: HCPCS | Performed by: INTERNAL MEDICINE

## 2023-08-17 PROCEDURE — 80076 HEPATIC FUNCTION PANEL: CPT

## 2023-08-17 PROCEDURE — 80069 RENAL FUNCTION PANEL: CPT

## 2023-08-17 PROCEDURE — 86850 RBC ANTIBODY SCREEN: CPT

## 2023-08-17 PROCEDURE — G8420 CALC BMI NORM PARAMETERS: HCPCS | Performed by: INTERNAL MEDICINE

## 2023-08-17 PROCEDURE — 1123F ACP DISCUSS/DSCN MKR DOCD: CPT | Performed by: INTERNAL MEDICINE

## 2023-08-17 PROCEDURE — 99214 OFFICE O/P EST MOD 30 MIN: CPT | Performed by: INTERNAL MEDICINE

## 2023-08-17 PROCEDURE — 86901 BLOOD TYPING SEROLOGIC RH(D): CPT

## 2023-08-17 PROCEDURE — 93010 ELECTROCARDIOGRAM REPORT: CPT | Performed by: INTERNAL MEDICINE

## 2023-08-17 PROCEDURE — 3078F DIAST BP <80 MM HG: CPT | Performed by: INTERNAL MEDICINE

## 2023-08-17 PROCEDURE — 85025 COMPLETE CBC W/AUTO DIFF WBC: CPT

## 2023-08-17 PROCEDURE — 86900 BLOOD TYPING SEROLOGIC ABO: CPT

## 2023-08-17 PROCEDURE — 83735 ASSAY OF MAGNESIUM: CPT

## 2023-08-17 PROCEDURE — 93005 ELECTROCARDIOGRAM TRACING: CPT | Performed by: INTERNAL MEDICINE

## 2023-08-17 PROCEDURE — 1036F TOBACCO NON-USER: CPT | Performed by: INTERNAL MEDICINE

## 2023-08-17 PROCEDURE — 3075F SYST BP GE 130 - 139MM HG: CPT | Performed by: INTERNAL MEDICINE

## 2023-08-17 PROCEDURE — 81001 URINALYSIS AUTO W/SCOPE: CPT

## 2023-08-17 PROCEDURE — 36415 COLL VENOUS BLD VENIPUNCTURE: CPT

## 2023-08-17 RX ORDER — DAPAGLIFLOZIN 10 MG/1
TABLET, FILM COATED ORAL
COMMUNITY
Start: 2023-08-14

## 2023-08-17 ASSESSMENT — PAIN SCALES - GENERAL: PAINLEVEL_OUTOF10: 0

## 2023-08-17 NOTE — PROGRESS NOTES
Pt here for PAT visit. Consents for procedure and blood signed by pt and in chart. Labs drawn and urine collected and sent to lab for testing as ordered. Pt seen by Dr. Jemma Do from Anesthesia and questions answered. Vitals stable and documented in flowsheet. Pt instructed to be NPO after midnight prior to procedure and states understanding. Pt provided with hibiclens and instructed to shower with entire bottle the night prior to procedure. Pt instructed to take the following medications the morning of procedure with a small sip of water: coreg. Pt instructed to stop taking eliquis prior to procedure on 8/24/23 and states understanding. EKG completed and results in chart. Pt instructed to arrive to the hospital the morning of procedure at 0700 on 8/29/23. Pt in good condition and okay for discharge. Pt denies further questions at time of discharge. Pt instructed to call Dr. Sheppard Bachelor office with any medical concerns or the heart office at [phone] with any further questions between now and day of procedure.

## 2023-08-18 LAB — BACTERIA UR CULT: NORMAL

## 2023-08-29 ENCOUNTER — HOSPITAL ENCOUNTER (INPATIENT)
Age: 83
LOS: 1 days | Discharge: HOME | DRG: 267 | End: 2023-08-30
Attending: INTERNAL MEDICINE | Admitting: INTERNAL MEDICINE
Payer: MEDICARE

## 2023-08-29 ENCOUNTER — ANESTHESIA (OUTPATIENT)
Dept: OPERATING ROOM | Age: 83
DRG: 267 | End: 2023-08-29
Payer: MEDICARE

## 2023-08-29 ENCOUNTER — TELEPHONE (OUTPATIENT)
Dept: CARDIOLOGY | Age: 83
End: 2023-08-29

## 2023-08-29 DIAGNOSIS — Z95.2 S/P TAVR (TRANSCATHETER AORTIC VALVE REPLACEMENT): Primary | ICD-10-CM

## 2023-08-29 PROBLEM — I35.0 AORTIC STENOSIS, SEVERE: Status: ACTIVE | Noted: 2023-08-29

## 2023-08-29 LAB
ABO + RH BLD: NORMAL
ACTIVATED CLOTTING TIME: 100 SEC (ref 99–130)
ACTIVATED CLOTTING TIME: 418 SEC (ref 99–130)
BASE EXCESS BLDA CALC-SCNC: 2 MMOL/L (ref -3–3)
BLD GP AB SCN SERPL QL: NORMAL
BLOOD BANK DISPENSE STATUS: NORMAL
BLOOD BANK PRODUCT CODE: NORMAL
BPU ID: NORMAL
CA-I BLD-SCNC: 1.21 MMOL/L (ref 1.12–1.32)
CO2 BLDA-SCNC: 28 MMOL/L
DESCRIPTION BLOOD BANK: NORMAL
EKG ATRIAL RATE: 48 BPM
EKG DIAGNOSIS: NORMAL
EKG Q-T INTERVAL: 464 MS
EKG QRS DURATION: 132 MS
EKG QTC CALCULATION (BAZETT): 478 MS
EKG R AXIS: -46 DEGREES
EKG T AXIS: 265 DEGREES
EKG VENTRICULAR RATE: 64 BPM
GLUCOSE BLD-MCNC: 110 MG/DL (ref 70–99)
GLUCOSE BLD-MCNC: 266 MG/DL (ref 70–99)
GLUCOSE BLD-MCNC: 97 MG/DL (ref 70–99)
HCO3 BLDA-SCNC: 26.5 MMOL/L (ref 21–29)
HCT VFR BLD AUTO: 25 % (ref 40.5–52.5)
HGB BLD CALC-MCNC: 8.7 GM/DL (ref 13.5–17.5)
LACTATE BLD-SCNC: <0.3 MMOL/L (ref 0.4–2)
LV EF: 55 %
LVEF MODALITY: NORMAL
NT-PROBNP SERPL-MCNC: 4361 PG/ML (ref 0–449)
PCO2 BLDA: 42.5 MM HG (ref 35–45)
PERFORMED ON: ABNORMAL
PH BLDA: 7.4 [PH] (ref 7.35–7.45)
PO2 BLDA: 464.5 MM HG (ref 75–108)
POC SAMPLE TYPE: ABNORMAL
POTASSIUM BLD-SCNC: 3.4 MMOL/L (ref 3.5–5.1)
SAO2 % BLDA: 100 % (ref 93–100)
SODIUM BLD-SCNC: 140 MMOL/L (ref 136–145)
TROPONIN, HIGH SENSITIVITY: 79 NG/L (ref 0–22)

## 2023-08-29 PROCEDURE — C1894 INTRO/SHEATH, NON-LASER: HCPCS

## 2023-08-29 PROCEDURE — 36415 COLL VENOUS BLD VENIPUNCTURE: CPT

## 2023-08-29 PROCEDURE — 6360000002 HC RX W HCPCS: Performed by: INTERNAL MEDICINE

## 2023-08-29 PROCEDURE — 82330 ASSAY OF CALCIUM: CPT

## 2023-08-29 PROCEDURE — 2060000000 HC ICU INTERMEDIATE R&B

## 2023-08-29 PROCEDURE — 3600000017 HC SURGERY HYBRID ADDL 15MIN

## 2023-08-29 PROCEDURE — C1889 IMPLANT/INSERT DEVICE, NOC: HCPCS

## 2023-08-29 PROCEDURE — 2580000003 HC RX 258: Performed by: NURSE ANESTHETIST, CERTIFIED REGISTERED

## 2023-08-29 PROCEDURE — 7100000011 HC PHASE II RECOVERY - ADDTL 15 MIN

## 2023-08-29 PROCEDURE — 3700000001 HC ADD 15 MINUTES (ANESTHESIA): Performed by: INTERNAL MEDICINE

## 2023-08-29 PROCEDURE — C1760 CLOSURE DEV, VASC: HCPCS

## 2023-08-29 PROCEDURE — 33361 REPLACE AORTIC VALVE PERQ: CPT | Performed by: STUDENT IN AN ORGANIZED HEALTH CARE EDUCATION/TRAINING PROGRAM

## 2023-08-29 PROCEDURE — 93005 ELECTROCARDIOGRAM TRACING: CPT | Performed by: INTERNAL MEDICINE

## 2023-08-29 PROCEDURE — 6360000002 HC RX W HCPCS: Performed by: NURSE ANESTHETIST, CERTIFIED REGISTERED

## 2023-08-29 PROCEDURE — C1769 GUIDE WIRE: HCPCS

## 2023-08-29 PROCEDURE — 2709999900 HC NON-CHARGEABLE SUPPLY: Performed by: INTERNAL MEDICINE

## 2023-08-29 PROCEDURE — 86900 BLOOD TYPING SEROLOGIC ABO: CPT

## 2023-08-29 PROCEDURE — 3700000000 HC ANESTHESIA ATTENDED CARE: Performed by: INTERNAL MEDICINE

## 2023-08-29 PROCEDURE — 83605 ASSAY OF LACTIC ACID: CPT

## 2023-08-29 PROCEDURE — 02RF38Z REPLACEMENT OF AORTIC VALVE WITH ZOOPLASTIC TISSUE, PERCUTANEOUS APPROACH: ICD-10-PCS | Performed by: INTERNAL MEDICINE

## 2023-08-29 PROCEDURE — 6370000000 HC RX 637 (ALT 250 FOR IP): Performed by: INTERNAL MEDICINE

## 2023-08-29 PROCEDURE — 7100000010 HC PHASE II RECOVERY - FIRST 15 MIN

## 2023-08-29 PROCEDURE — 86901 BLOOD TYPING SEROLOGIC RH(D): CPT

## 2023-08-29 PROCEDURE — 3600000007 HC SURGERY HYBRID BASE

## 2023-08-29 PROCEDURE — 86923 COMPATIBILITY TEST ELECTRIC: CPT

## 2023-08-29 PROCEDURE — 33361 REPLACE AORTIC VALVE PERQ: CPT | Performed by: INTERNAL MEDICINE

## 2023-08-29 PROCEDURE — 84132 ASSAY OF SERUM POTASSIUM: CPT

## 2023-08-29 PROCEDURE — 2580000003 HC RX 258: Performed by: INTERNAL MEDICINE

## 2023-08-29 PROCEDURE — 85347 COAGULATION TIME ACTIVATED: CPT

## 2023-08-29 PROCEDURE — 6360000004 HC RX CONTRAST MEDICATION: Performed by: INTERNAL MEDICINE

## 2023-08-29 PROCEDURE — 82947 ASSAY GLUCOSE BLOOD QUANT: CPT

## 2023-08-29 PROCEDURE — 84295 ASSAY OF SERUM SODIUM: CPT

## 2023-08-29 PROCEDURE — 2709999900 HC NON-CHARGEABLE SUPPLY

## 2023-08-29 PROCEDURE — 82803 BLOOD GASES ANY COMBINATION: CPT

## 2023-08-29 PROCEDURE — 83880 ASSAY OF NATRIURETIC PEPTIDE: CPT

## 2023-08-29 PROCEDURE — 84484 ASSAY OF TROPONIN QUANT: CPT

## 2023-08-29 PROCEDURE — 93010 ELECTROCARDIOGRAM REPORT: CPT | Performed by: INTERNAL MEDICINE

## 2023-08-29 PROCEDURE — 86850 RBC ANTIBODY SCREEN: CPT

## 2023-08-29 PROCEDURE — 85014 HEMATOCRIT: CPT

## 2023-08-29 RX ORDER — NITROGLYCERIN 20 MG/100ML
5 INJECTION INTRAVENOUS CONTINUOUS
Status: DISCONTINUED | OUTPATIENT
Start: 2023-08-29 | End: 2023-08-30

## 2023-08-29 RX ORDER — ACETAMINOPHEN 325 MG/1
650 TABLET ORAL EVERY 4 HOURS PRN
Status: DISCONTINUED | OUTPATIENT
Start: 2023-08-29 | End: 2023-08-30 | Stop reason: HOSPADM

## 2023-08-29 RX ORDER — LISINOPRIL 10 MG/1
10 TABLET ORAL DAILY
Status: DISCONTINUED | OUTPATIENT
Start: 2023-08-29 | End: 2023-08-30 | Stop reason: HOSPADM

## 2023-08-29 RX ORDER — CLOPIDOGREL BISULFATE 75 MG/1
75 TABLET ORAL DAILY
Status: DISCONTINUED | OUTPATIENT
Start: 2023-08-29 | End: 2023-08-30 | Stop reason: HOSPADM

## 2023-08-29 RX ORDER — AMLODIPINE BESYLATE 5 MG/1
5 TABLET ORAL 2 TIMES DAILY
Status: DISCONTINUED | OUTPATIENT
Start: 2023-08-29 | End: 2023-08-30 | Stop reason: HOSPADM

## 2023-08-29 RX ORDER — PROTAMINE SULFATE 10 MG/ML
INJECTION, SOLUTION INTRAVENOUS PRN
Status: DISCONTINUED | OUTPATIENT
Start: 2023-08-29 | End: 2023-08-29 | Stop reason: SDUPTHER

## 2023-08-29 RX ORDER — TAMSULOSIN HYDROCHLORIDE 0.4 MG/1
0.4 CAPSULE ORAL DAILY
Status: DISCONTINUED | OUTPATIENT
Start: 2023-08-29 | End: 2023-08-30 | Stop reason: HOSPADM

## 2023-08-29 RX ORDER — HEPARIN SODIUM 1000 [USP'U]/ML
INJECTION, SOLUTION INTRAVENOUS; SUBCUTANEOUS PRN
Status: DISCONTINUED | OUTPATIENT
Start: 2023-08-29 | End: 2023-08-29 | Stop reason: SDUPTHER

## 2023-08-29 RX ORDER — SODIUM CHLORIDE 0.9 % (FLUSH) 0.9 %
5-40 SYRINGE (ML) INJECTION PRN
Status: DISCONTINUED | OUTPATIENT
Start: 2023-08-29 | End: 2023-08-30 | Stop reason: HOSPADM

## 2023-08-29 RX ORDER — FUROSEMIDE 10 MG/ML
20 INJECTION INTRAMUSCULAR; INTRAVENOUS ONCE
Status: COMPLETED | OUTPATIENT
Start: 2023-08-29 | End: 2023-08-29

## 2023-08-29 RX ORDER — FUROSEMIDE 20 MG/1
20 TABLET ORAL DAILY
Status: DISCONTINUED | OUTPATIENT
Start: 2023-08-30 | End: 2023-08-30 | Stop reason: HOSPADM

## 2023-08-29 RX ORDER — PROPOFOL 10 MG/ML
INJECTION, EMULSION INTRAVENOUS PRN
Status: DISCONTINUED | OUTPATIENT
Start: 2023-08-29 | End: 2023-08-29 | Stop reason: SDUPTHER

## 2023-08-29 RX ORDER — SODIUM CHLORIDE, SODIUM LACTATE, POTASSIUM CHLORIDE, CALCIUM CHLORIDE 600; 310; 30; 20 MG/100ML; MG/100ML; MG/100ML; MG/100ML
INJECTION, SOLUTION INTRAVENOUS CONTINUOUS PRN
Status: DISCONTINUED | OUTPATIENT
Start: 2023-08-29 | End: 2023-08-29 | Stop reason: SDUPTHER

## 2023-08-29 RX ORDER — HYDRALAZINE HYDROCHLORIDE 20 MG/ML
10 INJECTION INTRAMUSCULAR; INTRAVENOUS EVERY 6 HOURS PRN
Status: DISCONTINUED | OUTPATIENT
Start: 2023-08-29 | End: 2023-08-30 | Stop reason: HOSPADM

## 2023-08-29 RX ORDER — ASPIRIN 81 MG/1
81 TABLET ORAL DAILY
Status: DISCONTINUED | OUTPATIENT
Start: 2023-08-29 | End: 2023-08-30 | Stop reason: HOSPADM

## 2023-08-29 RX ORDER — CARVEDILOL 3.12 MG/1
3.12 TABLET ORAL 2 TIMES DAILY WITH MEALS
Status: DISCONTINUED | OUTPATIENT
Start: 2023-08-29 | End: 2023-08-30 | Stop reason: HOSPADM

## 2023-08-29 RX ORDER — SODIUM CHLORIDE 9 MG/ML
INJECTION, SOLUTION INTRAVENOUS PRN
Status: DISCONTINUED | OUTPATIENT
Start: 2023-08-29 | End: 2023-08-30 | Stop reason: HOSPADM

## 2023-08-29 RX ORDER — 0.9 % SODIUM CHLORIDE 0.9 %
500 INTRAVENOUS SOLUTION INTRAVENOUS PRN
Status: DISCONTINUED | OUTPATIENT
Start: 2023-08-29 | End: 2023-08-30 | Stop reason: HOSPADM

## 2023-08-29 RX ORDER — PROPOFOL 10 MG/ML
INJECTION, EMULSION INTRAVENOUS CONTINUOUS PRN
Status: DISCONTINUED | OUTPATIENT
Start: 2023-08-29 | End: 2023-08-29 | Stop reason: SDUPTHER

## 2023-08-29 RX ORDER — ONDANSETRON 2 MG/ML
4 INJECTION INTRAMUSCULAR; INTRAVENOUS EVERY 6 HOURS PRN
Status: DISCONTINUED | OUTPATIENT
Start: 2023-08-29 | End: 2023-08-30 | Stop reason: HOSPADM

## 2023-08-29 RX ORDER — NITROGLYCERIN 20 MG/100ML
INJECTION INTRAVENOUS CONTINUOUS PRN
Status: DISCONTINUED | OUTPATIENT
Start: 2023-08-29 | End: 2023-08-29 | Stop reason: SDUPTHER

## 2023-08-29 RX ORDER — SODIUM CHLORIDE 9 MG/ML
INJECTION, SOLUTION INTRAVENOUS CONTINUOUS PRN
Status: DISCONTINUED | OUTPATIENT
Start: 2023-08-29 | End: 2023-08-29 | Stop reason: SDUPTHER

## 2023-08-29 RX ORDER — SODIUM CHLORIDE 0.9 % (FLUSH) 0.9 %
5-40 SYRINGE (ML) INJECTION EVERY 12 HOURS SCHEDULED
Status: DISCONTINUED | OUTPATIENT
Start: 2023-08-29 | End: 2023-08-30 | Stop reason: HOSPADM

## 2023-08-29 RX ORDER — DOCUSATE SODIUM 100 MG/1
100 CAPSULE, LIQUID FILLED ORAL DAILY
Status: DISCONTINUED | OUTPATIENT
Start: 2023-08-29 | End: 2023-08-30 | Stop reason: HOSPADM

## 2023-08-29 RX ORDER — SODIUM CHLORIDE, SODIUM LACTATE, POTASSIUM CHLORIDE, CALCIUM CHLORIDE 600; 310; 30; 20 MG/100ML; MG/100ML; MG/100ML; MG/100ML
INJECTION, SOLUTION INTRAVENOUS ONCE
Status: COMPLETED | OUTPATIENT
Start: 2023-08-29 | End: 2023-08-29

## 2023-08-29 RX ORDER — LIDOCAINE 4 G/G
1 PATCH TOPICAL ONCE
Status: COMPLETED | OUTPATIENT
Start: 2023-08-29 | End: 2023-08-29

## 2023-08-29 RX ORDER — ATROPINE SULFATE 0.4 MG/ML
0.5 INJECTION, SOLUTION INTRAMUSCULAR; INTRAVENOUS; SUBCUTANEOUS
Status: DISCONTINUED | OUTPATIENT
Start: 2023-08-29 | End: 2023-08-30 | Stop reason: HOSPADM

## 2023-08-29 RX ADMIN — SODIUM CHLORIDE, PRESERVATIVE FREE 10 ML: 5 INJECTION INTRAVENOUS at 20:33

## 2023-08-29 RX ADMIN — PHENYLEPHRINE HYDROCHLORIDE 50 MCG/MIN: 10 INJECTION INTRAVENOUS at 11:32

## 2023-08-29 RX ADMIN — PROTAMINE SULFATE 70 MG: 10 INJECTION, SOLUTION INTRAVENOUS at 12:12

## 2023-08-29 RX ADMIN — CEFAZOLIN 2000 MG: 2 INJECTION, POWDER, FOR SOLUTION INTRAMUSCULAR; INTRAVENOUS at 11:04

## 2023-08-29 RX ADMIN — SODIUM CHLORIDE, POTASSIUM CHLORIDE, SODIUM LACTATE AND CALCIUM CHLORIDE: 600; 310; 30; 20 INJECTION, SOLUTION INTRAVENOUS at 10:55

## 2023-08-29 RX ADMIN — SODIUM CHLORIDE: 9 INJECTION, SOLUTION INTRAVENOUS at 10:55

## 2023-08-29 RX ADMIN — HEPARIN SODIUM 7000 UNITS: 1000 INJECTION INTRAVENOUS; SUBCUTANEOUS at 11:44

## 2023-08-29 RX ADMIN — PROPOFOL 100 MCG/KG/MIN: 10 INJECTION, EMULSION INTRAVENOUS at 11:08

## 2023-08-29 RX ADMIN — PROPOFOL 25 MG: 10 INJECTION, EMULSION INTRAVENOUS at 11:12

## 2023-08-29 RX ADMIN — IOPAMIDOL 112 ML: 755 INJECTION, SOLUTION INTRAVENOUS at 12:34

## 2023-08-29 RX ADMIN — NITROGLYCERIN 50 MCG/MIN: 20 INJECTION INTRAVENOUS at 12:03

## 2023-08-29 RX ADMIN — DOCUSATE SODIUM 100 MG: 100 CAPSULE, LIQUID FILLED ORAL at 17:01

## 2023-08-29 RX ADMIN — TAMSULOSIN HYDROCHLORIDE 0.4 MG: 0.4 CAPSULE ORAL at 16:58

## 2023-08-29 RX ADMIN — AMLODIPINE BESYLATE 5 MG: 5 TABLET ORAL at 20:33

## 2023-08-29 RX ADMIN — LISINOPRIL 10 MG: 10 TABLET ORAL at 16:58

## 2023-08-29 RX ADMIN — CLOPIDOGREL BISULFATE 75 MG: 75 TABLET ORAL at 16:58

## 2023-08-29 RX ADMIN — EMPAGLIFLOZIN 10 MG: 10 TABLET, FILM COATED ORAL at 16:58

## 2023-08-29 RX ADMIN — FUROSEMIDE 20 MG: 10 INJECTION, SOLUTION INTRAMUSCULAR; INTRAVENOUS at 18:28

## 2023-08-29 RX ADMIN — CARVEDILOL 3.12 MG: 3.12 TABLET, FILM COATED ORAL at 16:58

## 2023-08-29 RX ADMIN — ASPIRIN 81 MG: 81 TABLET, COATED ORAL at 16:58

## 2023-08-29 RX ADMIN — SODIUM CHLORIDE, POTASSIUM CHLORIDE, SODIUM LACTATE AND CALCIUM CHLORIDE: 600; 310; 30; 20 INJECTION, SOLUTION INTRAVENOUS at 09:30

## 2023-08-29 ASSESSMENT — PAIN SCALES - GENERAL
PAINLEVEL_OUTOF10: 0

## 2023-08-29 NOTE — PROGRESS NOTES
Metronidazol (Por la boca)   Trata infecciones bacterianas. Mc(s) : Flagyl, Flagyl 375   Existen muchas otras marcas de [employer]. Keyla medicamento no debe ser usado cuando:   Keyla medicamento no es adecuado para todas las personas. No lo use si alguna vez ha tenido lorena reacción alérgica al metronidazol u otros medicamentos similares. No use keyla medicamento para el tratamiento de la tricomoniasis si [employer] 3 primeros meses de [facility]. Forma de usar keyla medicamento:   [place] de liberación prolongada  · [place] keyla medicamento siguiendo las indicaciones, y lo debe angela a la misma hora todos los días. · [place] o tableta: Tómela con comida o leche para evitar que le cause malestar estomacal.  · Tableta de liberación prolongada: Tómela con el estómago vacío, 1 hora antes o 2 horas después de Cerevo. · [place] la tableta de liberación prolongada entera. No triture, rompa o mastique. · [place] todo fournier medicamento recetado para eliminar fournier infección por completo aunque usted se sienta mejor después de las primeras dosis. · Si olvida lorena dosis:Si olvida lorena dosis de fournier medicamento, tómelo lo más pronto posible. Si es tessy la hora para fournier próxima dosis, espere hasta entonces para angela fournier dosis regular. No use medicamento adicional para reponer la dosis olvidada. · Guarde el medicamento en un recipiente cerrado a temperatura ambiente y alejado del calor, la humedad y la steve directa. Medicamentos y [employer] que debe evitar:   Consulte con fournier médico o farmacéutico antes de usar cualquier medicamento, incluyendo los que compra sin receta médica, las vitaminas y los productos herbales. · No use keyla medicamento si ha tomado disulfiram en las últimas 2 semanas. No shital alcohol o use medicamentos que contengan alcohol o propilenglicol mientras esté usando keyla medicamento y [place] por lo menos 3 días después de dion terminado el tratamiento con metronidazol.   · [place] y Pt verified information regarding surgery, name, birth date, surgeon, procedure and allergies. Patient admitted  to [address] [place] [address] 60 for surgery. Appropriate antibiotics ordered: Ancef . Beta blocker: Coreg . DVT prophyaxis: DOROTHY's and SCD's. Lab work within normal limits, 4 units of RBC's on call to OR, vital signs stable, Mepilex sacral border applied and 2% chlorhexidine gluconate skin prep given. Patient and family educated about surgery and pain management. Ready for procedure. Family @ bedside. Patient denies any complaints. medicamentos pueden afectar la manera kamran metronidazol funciona. Informe a fournier médico si [place] busulfán, cimetidina, litio, fenobarbital, fenitoína, warfarina u otro anticoagulante. Precauciones carlo el uso de tiffany medicamento:   · Informe a fournier médico si está embarazada o amamantando, si tiene lorena enfermedad renal, hepática o problemas con la médula ósea, candidiasis oral, lorena infección micótica vaginal o un historial de convulsiones. · Tiffany medicamento puede causar los siguientes problemas:  ¨ Problemas cerebrales o del sistema nervioso, incluyendo convulsiones, meningitis o problemas de visión  · Tratamiento para la tricomoniasis:  Es posible que fournier médico también quiera que fournier shanita sexual reciba tratamiento, incluso si no tiene síntomas. Es preferible que use un condón o preservativo cuando tenga relaciones sexuales. [place] ayudará a que usted no vuelva a contraer la infección de fournier shanita. Consulte con fournier médico si tiene alguna pregunta. · Llame a fournier médico si jeremiah síntomas no mejoran, o si Ric Bishop. · El médico solicitará exámenes de laboratorio carlo las citas de rutina para revisar los efectos de [place]. Asista a todas jeremiah citas. · Dígale a todo médico o dentista encargado de atenderle que usted está usando Dueñas. Puede que tiffnay medicamento afecte algunos resultados de [employer]. · Guarde todos los medicamentos fuera del alcance de los niños. Nunca comparta jeremiah medicamentos con [employer].   Efectos secundarios que pueden presentarse carlo el uso de tiffany medicamento:   Consulte inmediatamente con el médico si nota cualquiera de estos efectos secundarios:  · Reacción alérgica: Comezón o ronchas, hinchazón del mango o las gael, hinchazón u hormigueo en la boca o garganta, opresión en el pecho, dificultad para respirar  · Confusión, somnolencia, fiebre, dolor de [place], pérdida del apetito, náusea o vómito, rigidez de santiago o espalda  · Mareo, problemas con el control muscular, torpeza, temblores, dificultad para hablar  · [place], escalofríos, tos, dolor de garganta, o el cuerpo adolorido  · Pérdida de la sensación, hormigueo o ardor [employer], los brazos, las piernas o los pies  · Convulsiones  Consulte con el médico si nota los siguientes efectos secundarios menos graves:   · Náuseas leves  · Sabor inusual o desagradable en la boca  Consulte con el médico si nota otros efectos secundarios que joleen son causados por tiffany medicamento. Llame a fournier médico para consultarle Remi. Usted puede notificar jeremiah efectos secundarios al FDA al [id number]. © 2017 [facility] Information is for End User's use only and may not be sold, redistributed or otherwise used for commercial purposes. Esta información es sólo para uso en educación. Fournier intención no es darle un consejo médico sobre enfermedades o tratamientos. Colsulte con fournier Jacqualine Josie farmacéutico antes de seguir cualquier régimen médico para saber si es seguro y efectivo para usted.

## 2023-08-29 NOTE — PROGRESS NOTES
Pt. Received from procedure room in stable condition. Pt alert and oriented, RR easy and unlabored, vss. Physician at bedside updating pt. and family at plan of care. No further needs at this time.

## 2023-08-29 NOTE — PROGRESS NOTES
Pt. Sheath removed from Left  Femoral  Artery. Manual pressure held for 20 minutes. No bleeding, hematoma, or other complications noted. Quick clot and dressing applied. Pulses unchanged.

## 2023-08-29 NOTE — ANESTHESIA POSTPROCEDURE EVALUATION
Department of Anesthesiology  Postprocedure Note    Patient: Eliu Higuera  MRN: [id number]  YOB: 1940  Date of evaluation: 8/29/2023      Procedure Summary     Date: 08/29/23 Room / Location: [address] [place] [address]    Anesthesia Start: 1055 Anesthesia Stop:     Procedures:       TRANSCATHETER AORTIC VALVE REPLACEMENT FEMORAL APPROACH      TRANSCATHETER AORTIC VALVE REPLACEMENT FEMORAL APPROACH Diagnosis:       Aortic valve stenosis, etiology of cardiac valve disease unspecified      (Aortic valve stenosis, etiology of cardiac valve disease unspecified [I35.0])    Surgeons: Pili Armijo MD; Farheen Ely MD Responsible Provider: Williams Lewis MD    Anesthesia Type: MAC ASA Status: 4          Anesthesia Type: No value filed.     Chip Phase I: Chip Score: 10    Chip Phase II:        Anesthesia Post Evaluation    Patient location during evaluation: PACU  Patient participation: complete - patient participated  Level of consciousness: awake  Airway patency: patent  Nausea & Vomiting: no vomiting and no nausea  Complications: no  Cardiovascular status: hemodynamically stable  Respiratory status: acceptable  Hydration status: stable  Multimodal analgesia pain management approach  Pain management: adequate

## 2023-08-29 NOTE — PROGRESS NOTES
Pt admitted to 5907 s/p TAVR. Sites c/d/I. Pt alert and oriented, VSS. Pt educated on POC, precautions, verbalized understanding. Call light and bedside table within reach. Standard safety measures in place, bed alarm engaged.

## 2023-08-29 NOTE — H&P
[facility]  Cardiology Consult    Sharon Dunham  1940 August 29, 2023      Reason for Referral: AS    CC: Dyspnea      Subjective:     History of Present Illness:    Sharon Dunham is a 78 y.o. patient with a PMH significant for AS presented with complaints of dyspnea. Past Medical History:   has a past medical history of CAD (coronary artery disease), Diabetes mellitus ([address]), Hypertension, and Unspecified cerebral artery occlusion with cerebral infarction. Surgical History:   has a past surgical history that includes Coronary angioplasty with stent; Cardiac surgery (2002); hernia repair; and eye surgery (Left, 11/17/2016). Social History:   reports that he has never smoked. He has never used smokeless tobacco. He reports current alcohol use. He reports that he does not use drugs. Family History:  family history includes Diabetes in his mother. Home Medications:  Were reviewed and are listed in nursing record and/or below  Prior to Admission medications    Medication Sig Start Date End Date Taking?  Authorizing Provider   FARXIGA 10 MG tablet  8/14/23   Historical Provider, MD   atorvastatin (LIPITOR) 20 MG tablet Take 1 tablet by mouth daily 4/25/23   Babar Hong DO   aspirin 81 MG chewable tablet Take 1 tablet by mouth daily 4/15/23   Monserrat De La Cruz MD   carvedilol (COREG) 3.125 MG tablet Take 1 tablet by mouth 2 times daily (with meals) 4/14/23   Monserrat De La Cruz MD   apixaban Chu Neer) 5 MG TABS tablet Take 1 tablet by mouth 2 times daily 4/14/23   Monserrat De La Cruz MD   furosemide (LASIX) 20 MG tablet Take 1 tablet by mouth daily 4/14/23   Monserrat De La Cruz MD   amLODIPine (NORVASC) 5 MG tablet TAKE 1 TABLET BY MOUTH TWICE DAILY 1/3/23   Historical Provider, MD   Multiple Vitamins-Minerals (THERAPEUTIC MULTIVITAMIN-MINERALS) tablet Take 1 tablet by mouth daily    Historical Provider, MD   lisinopril (PRINIVIL;ZESTRIL) 10 MG tablet Take 1 tablet by mouth daily 1/12/23

## 2023-08-29 NOTE — TELEPHONE ENCOUNTER
Pt is scheduled for Echo on 10-3 at [facility] at 12:30 pm and OV at [facility] at 2:00 pm with Stephen Daniels.

## 2023-08-29 NOTE — PROCEDURES
[facility]  TAVR Operative Note     PROCEDURE SUMMARY   Procedure Replacement of aortic valve with zooplastic tissue, percutaneous ([id number])   Valve Type Holt Aviva 3 Ultra 26mm with 0additional cc of volume. Phil Manjarrez MD (IC)   Indication Nonrheumatic aortic valve stenosis (I35.0)   Consent Obtained, witnessed, documented in chart. EBL <18JS   Complications None   Specimens None   Bleed Risk Low   Sedation Sedation provided entirely by anesthesia. See record for details. TTE Performed preprocedure, intraprocedure and postprocedure. Access RCFA, LCFA, RCFV   Preclose Delivery side artery preclosed with 2 perclose devices   TVP Inserted via CFV into RV and threshold obtained and acceptable. Removed after valve implantation. Angiography Pigtail inserted through CFA into ascending aorta over wire   Delivery Sheath J wire advanced into descending Ao.  JR4 advanced over wire into descending Ao. Lunderquist wire advanced into descending Ao through JR4. Delivery sheath advanced over wire into descending aorta. AV Crossing AL1 and J/Straight wire combo used to cross AV. Amplatz extra stiff advanced through AL1 and curled in LV apex. BAV BAV not performed. Valve Deploy TAVR valve prepped by certified Leslie Bansal and advanced through the delivery sheath to the level of the aortic annulus. Valve localized under echo and fluoro guidance. Rapid pacing employed and valve deployed to profile for 4s. Balloon deflated and withdrawn into delivery sheath. Rapid pacing aborted. TTE reviewed for complications. Postdilation Post dilation was not performed   PostProcedure Wires removed and delivery catheter withdrawn. Delivery sheath removed and perclose sutures tied with hemostasis. Transitioned to postop/CVU.       CHF STATUS   Symptoms Onset: Onset: recent  Duration: months  Temporal: Worsening   CHF Type Chronic diastolic   NYHA III   [place] Major: Rales, Cardiomegaly on imaging  Minor: Dyspnea on exertion   CHF Meds    BNP Lab Results   Component Value Date    PROBNP 4,361 (H) 08/29/2023    PROBNP 11,371 (H) 04/14/2023    PROBNP 12,083 (H) 04/11/2023       IP Tx Plan Oral diuresis     MAJOR COMORBIDITIES AND COMPLICATIONS [facility])   Cardiac None   Liver None   Renal None   Respiratory None   Neurologic None     COMORBIDITIES AND COMPLICATIONS (CC)   Cardiac Chronic Diastolic CHF (N30.64)   Respiratory None   Neurologic None   Weight None     Suman Duncan MD, MPH  [facility], [place], operated by [facility]

## 2023-08-29 NOTE — OP NOTE
Operative Note      Patient: Dm Gleason  YOB: 1940  MRN: [id number]    Date of Procedure: 8/29/2023    Pre-Op Diagnosis Codes:     * Aortic valve stenosis, etiology of cardiac valve disease unspecified [I35.0]    Post-Op Diagnosis: Same       Procedure(s):  TRANSCATHETER AORTIC VALVE REPLACEMENT FEMORAL APPROACH  TRANSCATHETER AORTIC VALVE REPLACEMENT FEMORAL APPROACH    Surgeon(s):  MD Av Feldman MD    Assistant:   * No surgical staff found *    Anesthesia: General    Estimated Blood Loss (mL): less than 512     Complications: None    Specimens:   * No specimens in log *    Implants:  * No implants in log *      Drains: * No LDAs found *    Findings: 2mm gradient no PVL. Completion angio acceptable        Detailed Description of Procedure:   Procedure performed under MAC. he was prepped and draped in sterile fashion. After timeout was performed, the right common femoral artery was accessed under fluoroscopic guidance. Lidocaine had been injected at both sites. Catheter advanced followed by sheath which was flushed. A right femoral vein catheter was inserted without difficulty and flushed using seldinger technique. A left common femoral artery catheter was placed followed by the pigtail in the aortic root. A temporary pacing wire was advanced through the venous sheath under fluoroscopic guidance through the IVC into the right ventricle and tested. 2 Perclose devices were placed in the right femoral artery. Using a Amplatz wire the dry seal sheath was then placed. Heparin was given with satisfactory result of the ACT. The valve was crossed without difficulty. Wire exchanged for a stiff wire. The device was then brought into the descending thoracic aorta and prepared as per device recommendations. The 26mm +2 Holt S3U Resilia valve was then brought across the aortic arch across the valve in one fluid motion.   Fluoroscopic guidance as well as angio

## 2023-08-29 NOTE — PROGRESS NOTES
Pt. Sheath removed from Right  Femoral  Vein. Manual pressure held for 10 minutes. No bleeding, hematoma, or other complications noted. dressing applied. Pulses unchanged.

## 2023-08-29 NOTE — PROGRESS NOTES
SR on monitor, RN and MT noted rhythm change potential afib. Hx afib, EKG obtained, pt back to regular rhythm while performing EKG. EKG SR w/ first degree.

## 2023-08-29 NOTE — TELEPHONE ENCOUNTER
Frances, can you schedule pt for an echo and OV (same day) with Dr. Jeanie Becerra at [facility] on 10/3/23 in a TAVR spot for his 1 month post TAVR check? No need to call as I will let him know prior to DC.  Thanks, CARLOS Hartmann RN

## 2023-08-30 VITALS
RESPIRATION RATE: 16 BRPM | SYSTOLIC BLOOD PRESSURE: 107 MMHG | HEART RATE: 70 BPM | DIASTOLIC BLOOD PRESSURE: 72 MMHG | HEIGHT: 71 IN | OXYGEN SATURATION: 98 % | TEMPERATURE: 97.7 F | BODY MASS INDEX: 21.02 KG/M2 | WEIGHT: 150.13 LBS

## 2023-08-30 LAB
ANION GAP SERPL CALCULATED.3IONS-SCNC: 12 MMOL/L (ref 3–16)
BUN SERPL-MCNC: 30 MG/DL (ref 7–20)
CALCIUM SERPL-MCNC: 9.1 MG/DL (ref 8.3–10.6)
CHLORIDE SERPL-SCNC: 102 MMOL/L (ref 99–110)
CO2 SERPL-SCNC: 24 MMOL/L (ref 21–32)
CREAT SERPL-MCNC: 1.6 MG/DL (ref 0.8–1.3)
DEPRECATED RDW RBC AUTO: 13.5 % (ref 12.4–15.4)
EKG ATRIAL RATE: 64 BPM
EKG DIAGNOSIS: NORMAL
EKG P AXIS: 87 DEGREES
EKG P-R INTERVAL: 386 MS
EKG Q-T INTERVAL: 478 MS
EKG QRS DURATION: 140 MS
EKG QTC CALCULATION (BAZETT): 493 MS
EKG R AXIS: -27 DEGREES
EKG T AXIS: 219 DEGREES
EKG VENTRICULAR RATE: 64 BPM
GFR SERPLBLD CREATININE-BSD FMLA CKD-EPI: 42 ML/MIN/{1.73_M2}
GLUCOSE BLD-MCNC: 137 MG/DL (ref 70–99)
GLUCOSE BLD-MCNC: 153 MG/DL (ref 70–99)
GLUCOSE SERPL-MCNC: 123 MG/DL (ref 70–99)
HCT VFR BLD AUTO: 30.4 % (ref 40.5–52.5)
HGB BLD-MCNC: 10.6 G/DL (ref 13.5–17.5)
LV EF: 60 %
LVEF MODALITY: NORMAL
MAGNESIUM SERPL-MCNC: 2.3 MG/DL (ref 1.8–2.4)
MCH RBC QN AUTO: 33.6 PG (ref 26–34)
MCHC RBC AUTO-ENTMCNC: 34.7 G/DL (ref 31–36)
MCV RBC AUTO: 96.7 FL (ref 80–100)
PERFORMED ON: ABNORMAL
PERFORMED ON: ABNORMAL
PLATELET # BLD AUTO: 149 K/UL (ref 135–450)
PMV BLD AUTO: 7.4 FL (ref 5–10.5)
POTASSIUM SERPL-SCNC: 3.5 MMOL/L (ref 3.5–5.1)
RBC # BLD AUTO: 3.15 M/UL (ref 4.2–5.9)
SODIUM SERPL-SCNC: 138 MMOL/L (ref 136–145)
WBC # BLD AUTO: 6.9 K/UL (ref 4–11)

## 2023-08-30 PROCEDURE — 93306 TTE W/DOPPLER COMPLETE: CPT

## 2023-08-30 PROCEDURE — 94150 VITAL CAPACITY TEST: CPT

## 2023-08-30 PROCEDURE — 85027 COMPLETE CBC AUTOMATED: CPT

## 2023-08-30 PROCEDURE — 80048 BASIC METABOLIC PNL TOTAL CA: CPT

## 2023-08-30 PROCEDURE — 6370000000 HC RX 637 (ALT 250 FOR IP): Performed by: INTERNAL MEDICINE

## 2023-08-30 PROCEDURE — 36415 COLL VENOUS BLD VENIPUNCTURE: CPT

## 2023-08-30 PROCEDURE — 83735 ASSAY OF MAGNESIUM: CPT

## 2023-08-30 PROCEDURE — 99239 HOSP IP/OBS DSCHRG MGMT >30: CPT | Performed by: INTERNAL MEDICINE

## 2023-08-30 PROCEDURE — 99232 SBSQ HOSP IP/OBS MODERATE 35: CPT

## 2023-08-30 PROCEDURE — 93010 ELECTROCARDIOGRAM REPORT: CPT | Performed by: INTERNAL MEDICINE

## 2023-08-30 PROCEDURE — 2580000003 HC RX 258: Performed by: INTERNAL MEDICINE

## 2023-08-30 RX ORDER — FUROSEMIDE 20 MG/1
20 TABLET ORAL PRN
Qty: 30 TABLET | Refills: 0 | Status: SHIPPED | OUTPATIENT
Start: 2023-08-30

## 2023-08-30 RX ADMIN — DOCUSATE SODIUM 100 MG: 100 CAPSULE, LIQUID FILLED ORAL at 08:33

## 2023-08-30 RX ADMIN — TAMSULOSIN HYDROCHLORIDE 0.4 MG: 0.4 CAPSULE ORAL at 08:34

## 2023-08-30 RX ADMIN — ASPIRIN 81 MG: 81 TABLET, COATED ORAL at 08:33

## 2023-08-30 RX ADMIN — AMLODIPINE BESYLATE 5 MG: 5 TABLET ORAL at 08:34

## 2023-08-30 RX ADMIN — FUROSEMIDE 20 MG: 20 TABLET ORAL at 08:34

## 2023-08-30 RX ADMIN — LISINOPRIL 10 MG: 10 TABLET ORAL at 08:34

## 2023-08-30 RX ADMIN — EMPAGLIFLOZIN 10 MG: 10 TABLET, FILM COATED ORAL at 08:34

## 2023-08-30 RX ADMIN — CARVEDILOL 3.12 MG: 3.12 TABLET, FILM COATED ORAL at 08:34

## 2023-08-30 RX ADMIN — CLOPIDOGREL BISULFATE 75 MG: 75 TABLET ORAL at 08:34

## 2023-08-30 RX ADMIN — SODIUM CHLORIDE, PRESERVATIVE FREE 10 ML: 5 INJECTION INTRAVENOUS at 08:34

## 2023-08-30 ASSESSMENT — PAIN SCALES - GENERAL
PAINLEVEL_OUTOF10: 0
PAINLEVEL_OUTOF10: 0

## 2023-08-30 NOTE — PROGRESS NOTES
Discharge instructions reviewed with patient and daughter. Verbalized understanding. IV dc'd without complications. Tele box returned to nurse's station. Patient to be taken to front entrance via wheelchair.

## 2023-08-30 NOTE — PROGRESS NOTES
Incentive Spirometry education and demonstration completed by Respiratory Therapy Yes      Response to education: Very Good     Teaching Time: 5 minutes    Minimum Predicted Vital Capacity - 753 mL. Patient's Actual Vital Capacity - 1500 mL. Turning over to Nursing for routine follow-up Yes.     Comments:     Electronically signed by Billy Najjar, RCP on 8/30/2023 at 12:41 PM

## 2023-08-30 NOTE — PROGRESS NOTES
Patient ambulated in you without difficulty. Bilateral groin sites WNL.  Pt up to chair per MD request.

## 2023-08-30 NOTE — DISCHARGE INSTRUCTIONS
Post Transcatheter Aortic Valve Replacement (TAVR) Discharge Instructions     Your follow-up appointment and echocardiogram will be scheduled by Dr. Tono Jenkins or Dr. Kirill Velásquez office and their office staff and will call you with the date and time. We are here for you! If you have any questions please call: Eri Solano RN Valve Coordinator at  [phone]      Do you have the help you need at home? ACTIVITY:  Weigh yourself every day in the morning after using the bathroom. Your discharge weight was 150lb 2.1oz   NO DRIVING UNTIL YOU RETURN TO THE DOCTOR'S OFFICE. You may ride in a car. Do not lift more than five to ten pounds for the first week you are home.  (A gallon of milk is 7 lbs)  Get up and get dressed every day. Do not stay in bed. Set a daily routine. This is an important step in re-gaining your strength. You may walk up and down stairs. Walk as much as you can. This will help facilitate the healing process. Plan rest periods during the day. Cough and deep breathe, and use your incentive spirometer 10 times every hour while you are awake. Avoid work that increases muscle tension. (straining with bowel movements, moving furniture)    WOUND CARE:  Shower every day but no bathtubs, pools, or hot tubs for the first week you are home. Cleanse wounds with mild soap and water and pat dry. Keep site dry. A small amount of bloody or clear drainage is normal.   Watch for signs of infection: incision red or hot to the touch, fever > 101F, swelling at the groin or incision site, discolored drainage from your incision.      NUTRITION:   Low fat, low salt diet (guidelines for Heart Healthy eating)  Limit caffeine to 1-2 cups per day (coffee, tea, chocolate, soda)  Eat high fiber to avoid constipation and straining during bowel movements (fresh veggies and fruit, whole grains)  Limit alcohol to two servings a day ( 8 oz beer, 1 oz liquor, 4 oz wine)    HEALTHY HABITS:  No Smoking!!!! If you need help to stop smoking, please call your doctor. Attempt to achieve and maintain your ideal body weight. We suggest that you walk as much as you can, but do not exhaust yourself. Set a goal and work your way up to it at a paced rate. MEDICATIONS:  DO NOT STOP TAKING PLAVIX OR ANY BLOOD THINNER WITHOUT SPEAKING WITH YOUR CARDIOLOGIST! Take your pills as ordered at time of discharge. Do not stop taking any medication without first discussing it with your doctor. Avoid all over the counter medications, herbal, and natural supplements unless you have discussed them with your doctor. It is important to follow your doctor's orders, especially if blood thinning drugs are prescribed. Your doctor will monitor your medicine and advise you when or if you can stop taking it. WHO DO YOU NEED TO TELL ABOUT YOUR IMPLANTED VALVE? Regular check-ups by your cardiologist are very important. It is easier for patients with a heart valve replacement to get infections, which could lead to further heart damage. Before any dental work or surgery is done, tell your dentist or surgeon about your heart valve replacement. You may need to take antibiotics before and after some medical procedures to reduce risk of infection. Always tell the doctor (or medical technician) that you have an implanted heart valve. Failure to do so may result in damage to the heart valve that could result in death. Before an MRI (magnetic resonance imaging) procedure, always notify the doctor (or medical technician) that you have an implanted heart valve. What symptoms or health problems do you need to look out for after you leave the hospital? Call your physician if you have any of these symptoms: (Phone Number [phone])  Weight pound gain of 3 pounds in one day or 5 pounds in one week.  Weight gain is NOT normal.    Increased swelling or bruising of the groin, legs, ankles, or

## 2023-08-31 NOTE — DISCHARGE SUMMARY
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 See operative note for further detail. Admit NYHA III   Admit [place] Major: Rales, Cardiomegaly on imaging  Minor: Dyspnea on exertion   Hospital CHF Treated with TAVR. Treated with IV diuretics. Procedure [facility] A/C Diastolic CHF (K31.48)   Hospital Course Admitted for TAVR for severe aortic stenosis. Patient tolerated procedure and postoperative period well without evidence for complication. Access site(s) assessed and stable prior to discharge. Consult IP CONSULT TO CARDIAC REHAB   Subjective Patient seen and examined. Notes, labs, tele and recent testing reviewed. No acute issue overnight and patient without concern.      Exam Gen Alert, coop, no distress Heart  Irreg Irreg, no MRG   Head NC, AT, no abnorm Abd  Soft, NT, +BS, no mass, no OM   Eyes PER, conj/corn clear Ext  No C/C, Grade 0 (0mm)   Nose Nares nl, no drain, NT Pulse 2+ and symmetric   Throat Lips, mucosa, tongue nl Skin Col/text/turg nl, no vis rash/les   Neck S/S, TM, NT, no JVD Psych Nl mood and affect   Lung CTA-B, unlab, no DTP Lymph   No cervical or axillary LA   Ch wall NT, no

## 2023-09-18 NOTE — PROGRESS NOTES
Physician Progress Note      PATIENT:               Jamee Lewis  CSN #:                  [id number]  :                       1940  ADMIT DATE:       2023 8:27 AM  DISCH DATE:        2023 2:46 PM  RESPONDING  PROVIDER #:        Pool Bal MD          QUERY TEXT:    Patient admitted for TAVR. Noted documentation of Acute on chronic diastolic   on DCS and Chronic diastolic HF on HP. If possible, please document in progress notes and discharge summary if you   are evaluating and /or treating any of the following: The medical record reflects the following:  Risk Factors: 77 yo with severe aortic stenosis  Clinical Indicators: HP, \"Chronic diastolic HF; NYHA III\"  DCS, \"Acute on chronic diastolic\" \"Major: Rales, Cardiomegaly on imaging   Minor: Dyspnea on exertion\"  Treatment: IV diuretics, TAVR  Options provided:  -- Acute on chronic diastolic confirmed and Chronic diastolic HF ruled out  -- Chronic diastolic HF confirmed and Acute on chronic diastolic ruled out  -- Other - I will add my own diagnosis  -- Disagree - Not applicable / Not valid  -- Disagree - Clinically unable to determine / Unknown  -- Refer to Clinical Documentation Reviewer    PROVIDER RESPONSE TEXT:    After study, Acute on chronic diastolic confirmed and Chronic diastolic HF   ruled out. Query created by:  Navdeep Honeycutt on 2023 7:33 AM      Electronically signed by:  Pool Bal MD 2023 3:43 PM

## 2023-10-04 ENCOUNTER — TELEPHONE (OUTPATIENT)
Dept: CARDIOLOGY | Age: 83
End: 2023-10-04

## 2023-10-04 NOTE — TELEPHONE ENCOUNTER
Frances, can you get Mr. Christiano Bolivar rescheduled for his echo and OV for TAVR FU (miissed both echo and OV appts)? Can you schedule both same day with Dr. Liset Craig in [place] on 11/13/23?  Thanks, CARLOS Hartmann RN

## 2023-10-10 RX ORDER — LISINOPRIL 10 MG/1
10 TABLET ORAL DAILY
Qty: 30 TABLET | Refills: 0 | Status: SHIPPED | OUTPATIENT
Start: 2023-10-10

## 2023-11-13 ENCOUNTER — PROCEDURE VISIT (OUTPATIENT)
Dept: CARDIOLOGY CLINIC | Age: 83
End: 2023-11-13

## 2023-11-13 ENCOUNTER — OFFICE VISIT (OUTPATIENT)
Dept: CARDIOLOGY CLINIC | Age: 83
End: 2023-11-13
Payer: MEDICARE

## 2023-11-13 ENCOUNTER — TELEPHONE (OUTPATIENT)
Dept: CARDIOLOGY CLINIC | Age: 83
End: 2023-11-13

## 2023-11-13 VITALS
WEIGHT: 143.4 LBS | HEIGHT: 71 IN | DIASTOLIC BLOOD PRESSURE: 66 MMHG | SYSTOLIC BLOOD PRESSURE: 118 MMHG | BODY MASS INDEX: 20.08 KG/M2 | OXYGEN SATURATION: 97 % | HEART RATE: 58 BPM

## 2023-11-13 DIAGNOSIS — Z95.2 S/P TAVR (TRANSCATHETER AORTIC VALVE REPLACEMENT): ICD-10-CM

## 2023-11-13 DIAGNOSIS — I25.10 CORONARY ARTERY DISEASE DUE TO LIPID RICH PLAQUE: ICD-10-CM

## 2023-11-13 DIAGNOSIS — I25.83 CORONARY ARTERY DISEASE DUE TO LIPID RICH PLAQUE: ICD-10-CM

## 2023-11-13 DIAGNOSIS — I48.0 PAF (PAROXYSMAL ATRIAL FIBRILLATION) (HCC): ICD-10-CM

## 2023-11-13 DIAGNOSIS — I35.0 AORTIC VALVE STENOSIS, NONRHEUMATIC: Primary | ICD-10-CM

## 2023-11-13 DIAGNOSIS — I10 ESSENTIAL HYPERTENSION: ICD-10-CM

## 2023-11-13 PROCEDURE — 1036F TOBACCO NON-USER: CPT | Performed by: INTERNAL MEDICINE

## 2023-11-13 PROCEDURE — 99214 OFFICE O/P EST MOD 30 MIN: CPT | Performed by: INTERNAL MEDICINE

## 2023-11-13 PROCEDURE — G8420 CALC BMI NORM PARAMETERS: HCPCS | Performed by: INTERNAL MEDICINE

## 2023-11-13 PROCEDURE — 3074F SYST BP LT 130 MM HG: CPT | Performed by: INTERNAL MEDICINE

## 2023-11-13 PROCEDURE — 1123F ACP DISCUSS/DSCN MKR DOCD: CPT | Performed by: INTERNAL MEDICINE

## 2023-11-13 PROCEDURE — G8484 FLU IMMUNIZE NO ADMIN: HCPCS | Performed by: INTERNAL MEDICINE

## 2023-11-13 PROCEDURE — 3078F DIAST BP <80 MM HG: CPT | Performed by: INTERNAL MEDICINE

## 2023-11-13 PROCEDURE — G8427 DOCREV CUR MEDS BY ELIG CLIN: HCPCS | Performed by: INTERNAL MEDICINE

## 2023-11-13 RX ORDER — AMLODIPINE BESYLATE 5 MG/1
TABLET ORAL
Qty: 90 TABLET | Refills: 3 | Status: SHIPPED | OUTPATIENT
Start: 2023-11-13

## 2023-11-13 NOTE — TELEPHONE ENCOUNTER
Hello, can you call pt and schedule him for a 30 day event recorder for SASHA titus? He is aware that you will be calling him.  Kimberly CARO

## 2023-11-15 RX ORDER — LISINOPRIL 10 MG/1
10 TABLET ORAL DAILY
Qty: 30 TABLET | Refills: 0 | Status: SHIPPED | OUTPATIENT
Start: 2023-11-15

## 2023-11-16 RX ORDER — LISINOPRIL 10 MG/1
10 TABLET ORAL DAILY
Qty: 30 TABLET | Refills: 0 | OUTPATIENT
Start: 2023-11-16

## 2023-11-16 NOTE — TELEPHONE ENCOUNTER
Pt has LAB/MA visit TOMORROW 11/17/2023 for monitor placement. When pt has monitor placed will schedule f/u appt with ENO[facility]. Closing this encounter and adding this info to appt notes.

## 2023-11-17 ENCOUNTER — TELEPHONE (OUTPATIENT)
Dept: CARDIOLOGY CLINIC | Age: 83
End: 2023-11-17

## 2023-11-17 NOTE — TELEPHONE ENCOUNTER
Monitor placed by Cortney Oviedo, [address]  Length of monitor 30 days  Monitor ordered by Dr. Farley Kehr  Phone ID [id number]  Activation successful prior to pt leaving office?  Yes

## 2023-11-27 RX ORDER — LISINOPRIL 10 MG/1
10 TABLET ORAL DAILY
Qty: 30 TABLET | Refills: 0 | OUTPATIENT
Start: 2023-11-27

## 2023-12-12 RX ORDER — LISINOPRIL 10 MG/1
10 TABLET ORAL DAILY
Qty: 30 TABLET | Refills: 0 | Status: SHIPPED | OUTPATIENT
Start: 2023-12-12

## 2024-01-02 NOTE — PROGRESS NOTES
[facility]   Cardiac follow up    Referring Provider:  Marlo Pichardo MD     Chief Complaint   Patient presents with    Follow-up    Coronary Artery Disease    Hypertension    Congestive Heart Failure    Hyperlipidemia      Previous patient of Dr. JUNAID Shah and last seen on 10/10/2016. Referred from Dr. Pichardo and saw initially 1/12/23    History of Present Illness:  Ron Chirinos 1940 is a 82yo male with PMH CAD s/p CABG 2005 (cath 2009 patent grafts), HTN, HLD, DM II, severe AS/AI s/p TAVR per Dr. Feliciano 8/29/23, undefined CHF, and mild carotid stenosis  Lina nuc 6/24/2013 EF=49%; small inferior wall scar.    At OV 1/23 he had AS murmur and reported AV disease prior but no recent study. Echo 2/21/2023 EF of 60-65% with moderate-severe AI and severe AS. C 4/23 severe native CAD with  of ostial LM and mid RCA; patent grafts (LIMA-LAD, SVG-diag, and SVG-RPDA). Carotid duplex 4/8/23 showed <50% stenosis bilaterally (no change 2015). S/P TAVR 8/29/2023. ECHO 11/23 EF=60%, well-seated TAVR with mild TR; Cardiac monitor 11/27/23-12/12/23 predominately NSR with avg HR of 61 () BPM, no Afib, 3.5% PVC burden, 1% PACs.   Today, patient reports he is doing well. Doing daily activities without issues. He reports he takings his medications around noon every day. He reports he is not trying to lose weight and attributes weight loss to DM medications. Patient is taking all cardiac medications as prescribed and tolerates them well. Patient denies current edema, chest pain, sob, palpitations, dizziness or syncope.     Weight today is 148# (Down 6# from 1/2023)    Past Medical History:   has a past medical history of CAD (coronary artery disease), Diabetes mellitus (HCC), Hypertension, and Unspecified cerebral artery occlusion with cerebral infarction.    Surgical History:   has a past surgical history that includes Coronary angioplasty with stent; Cardiac surgery (2002); hernia repair; eye surgery

## 2024-01-05 ENCOUNTER — OFFICE VISIT (OUTPATIENT)
Dept: CARDIOLOGY CLINIC | Age: 84
End: 2024-01-05

## 2024-01-05 VITALS
BODY MASS INDEX: 20.77 KG/M2 | HEIGHT: 71 IN | SYSTOLIC BLOOD PRESSURE: 138 MMHG | HEART RATE: 74 BPM | WEIGHT: 148.4 LBS | OXYGEN SATURATION: 97 % | DIASTOLIC BLOOD PRESSURE: 80 MMHG

## 2024-01-05 DIAGNOSIS — I35.0 SEVERE AORTIC VALVE STENOSIS: Primary | ICD-10-CM

## 2024-01-05 PROCEDURE — 93228 REMOTE 30 DAY ECG REV/REPORT: CPT | Performed by: INTERNAL MEDICINE

## 2024-01-05 NOTE — PATIENT INSTRUCTIONS
Plan:  EKG today.   OK to stop Eliquis. No Afib on monitor. Stay on baby aspirin.   Discuss Farxiga with Dr. Salinas to address unintentional weight loss on this medication.   No need for PCI at this time as patient is asymptomatic.     Follow up with me in 6 months.

## 2024-01-15 DIAGNOSIS — I48.0 PAF (PAROXYSMAL ATRIAL FIBRILLATION) (HCC): ICD-10-CM

## 2024-03-22 ENCOUNTER — ANESTHESIA EVENT (OUTPATIENT)
Dept: ENDOSCOPY | Age: 84
End: 2024-03-22
Payer: MEDICARE

## 2024-03-22 ENCOUNTER — HOSPITAL ENCOUNTER (INPATIENT)
Age: 84
LOS: 5 days | Discharge: HOME | DRG: 377 | End: 2024-03-27
Attending: EMERGENCY MEDICINE | Admitting: INTERNAL MEDICINE
Payer: MEDICARE

## 2024-03-22 ENCOUNTER — ANESTHESIA (OUTPATIENT)
Dept: ENDOSCOPY | Age: 84
End: 2024-03-22
Payer: MEDICARE

## 2024-03-22 ENCOUNTER — APPOINTMENT (OUTPATIENT)
Dept: GENERAL RADIOLOGY | Age: 84
DRG: 377 | End: 2024-03-22
Payer: MEDICARE

## 2024-03-22 DIAGNOSIS — D64.9 ANEMIA, UNSPECIFIED TYPE: ICD-10-CM

## 2024-03-22 DIAGNOSIS — N18.9 CHRONIC KIDNEY DISEASE, UNSPECIFIED CKD STAGE: ICD-10-CM

## 2024-03-22 DIAGNOSIS — D62 ACUTE BLOOD LOSS ANEMIA: ICD-10-CM

## 2024-03-22 DIAGNOSIS — K92.1 MELENA: Primary | ICD-10-CM

## 2024-03-22 PROBLEM — K92.2 GIB (GASTROINTESTINAL BLEEDING): Status: ACTIVE | Noted: 2024-03-22

## 2024-03-22 LAB
ABO + RH BLD: NORMAL
ALBUMIN SERPL-MCNC: 3.4 G/DL (ref 3.4–5)
ALBUMIN/GLOB SERPL: 1.9 {RATIO} (ref 1.1–2.2)
ALP SERPL-CCNC: 57 U/L (ref 40–129)
ALT SERPL-CCNC: 13 U/L (ref 10–40)
ANION GAP SERPL CALCULATED.3IONS-SCNC: 14 MMOL/L (ref 3–16)
AST SERPL-CCNC: 14 U/L (ref 15–37)
BASE EXCESS BLDV CALC-SCNC: -9.2 MMOL/L (ref -3–3)
BASOPHILS # BLD: 0 K/UL (ref 0–0.2)
BASOPHILS NFR BLD: 0.4 %
BILIRUB SERPL-MCNC: 0.3 MG/DL (ref 0–1)
BLD GP AB SCN SERPL QL: NORMAL
BLOOD BANK DISPENSE STATUS: NORMAL
BLOOD BANK PRODUCT CODE: NORMAL
BPU ID: NORMAL
BUN SERPL-MCNC: 94 MG/DL (ref 7–20)
CALCIUM SERPL-MCNC: 8.6 MG/DL (ref 8.3–10.6)
CHLORIDE SERPL-SCNC: 104 MMOL/L (ref 99–110)
CO2 BLDV-SCNC: 19 MMOL/L
CO2 SERPL-SCNC: 19 MMOL/L (ref 21–32)
COHGB MFR BLDV: 4.2 % (ref 0–1.5)
CREAT SERPL-MCNC: 1.6 MG/DL (ref 0.8–1.3)
DEPRECATED RDW RBC AUTO: 13.7 % (ref 12.4–15.4)
DESCRIPTION BLOOD BANK: NORMAL
EKG ATRIAL RATE: 416 BPM
EKG DIAGNOSIS: NORMAL
EKG Q-T INTERVAL: 458 MS
EKG QRS DURATION: 140 MS
EKG QTC CALCULATION (BAZETT): 518 MS
EKG R AXIS: 7 DEGREES
EKG T AXIS: 97 DEGREES
EKG VENTRICULAR RATE: 77 BPM
EOSINOPHIL # BLD: 0.2 K/UL (ref 0–0.6)
EOSINOPHIL NFR BLD: 1.5 %
FLUAV RNA RESP QL NAA+PROBE: NOT DETECTED
FLUBV RNA RESP QL NAA+PROBE: NOT DETECTED
GFR SERPLBLD CREATININE-BSD FMLA CKD-EPI: 42 ML/MIN/{1.73_M2}
GLUCOSE BLD-MCNC: 131 MG/DL (ref 70–99)
GLUCOSE BLD-MCNC: 162 MG/DL (ref 70–99)
GLUCOSE SERPL-MCNC: 199 MG/DL (ref 70–99)
HCO3 BLDV-SCNC: 17.5 MMOL/L (ref 23–29)
HCT VFR BLD AUTO: 19.9 % (ref 40.5–52.5)
HCT VFR BLD AUTO: 21.3 % (ref 40.5–52.5)
HEMOCCULT SP1 STL QL: ABNORMAL
HGB BLD-MCNC: 6.5 G/DL (ref 13.5–17.5)
HGB BLD-MCNC: 7 G/DL (ref 13.5–17.5)
LACTATE BLDV-SCNC: 1.4 MMOL/L (ref 0.4–1.9)
LACTATE BLDV-SCNC: 2.4 MMOL/L (ref 0.4–1.9)
LACTATE BLDV-SCNC: 3.4 MMOL/L (ref 0.4–1.9)
LYMPHOCYTES # BLD: 2.8 K/UL (ref 1–5.1)
LYMPHOCYTES NFR BLD: 24.8 %
MCH RBC QN AUTO: 32.5 PG (ref 26–34)
MCHC RBC AUTO-ENTMCNC: 33 G/DL (ref 31–36)
MCV RBC AUTO: 98.4 FL (ref 80–100)
METHGB MFR BLDV: 0 %
MONOCYTES # BLD: 1.2 K/UL (ref 0–1.3)
MONOCYTES NFR BLD: 10.3 %
NEUTROPHILS # BLD: 7.2 K/UL (ref 1.7–7.7)
NEUTROPHILS NFR BLD: 63 %
O2 THERAPY: ABNORMAL
PCO2 BLDV: 41.4 MMHG (ref 40–50)
PERFORMED ON: ABNORMAL
PERFORMED ON: ABNORMAL
PH BLDV: 7.24 [PH] (ref 7.35–7.45)
PLATELET # BLD AUTO: 142 K/UL (ref 135–450)
PMV BLD AUTO: 8.1 FL (ref 5–10.5)
PO2 BLDV: 51.7 MMHG (ref 25–40)
POTASSIUM SERPL-SCNC: 4.2 MMOL/L (ref 3.5–5.1)
PROT SERPL-MCNC: 5.2 G/DL (ref 6.4–8.2)
RBC # BLD AUTO: 2.17 M/UL (ref 4.2–5.9)
SAO2 % BLDV: 79 %
SARS-COV-2 RNA RESP QL NAA+PROBE: NOT DETECTED
SODIUM SERPL-SCNC: 137 MMOL/L (ref 136–145)
SPECIMEN STATUS: NORMAL
TROPONIN, HIGH SENSITIVITY: 91 NG/L (ref 0–22)
TROPONIN, HIGH SENSITIVITY: 91 NG/L (ref 0–22)
WBC # BLD AUTO: 11.4 K/UL (ref 4–11)

## 2024-03-22 PROCEDURE — 87040 BLOOD CULTURE FOR BACTERIA: CPT

## 2024-03-22 PROCEDURE — 85014 HEMATOCRIT: CPT

## 2024-03-22 PROCEDURE — 3700000000 HC ANESTHESIA ATTENDED CARE: Performed by: INTERNAL MEDICINE

## 2024-03-22 PROCEDURE — 6360000002 HC RX W HCPCS: Performed by: NURSE PRACTITIONER

## 2024-03-22 PROCEDURE — 30233N1 TRANSFUSION OF NONAUTOLOGOUS RED BLOOD CELLS INTO PERIPHERAL VEIN, PERCUTANEOUS APPROACH: ICD-10-PCS | Performed by: INTERNAL MEDICINE

## 2024-03-22 PROCEDURE — 86901 BLOOD TYPING SEROLOGIC RH(D): CPT

## 2024-03-22 PROCEDURE — 36430 TRANSFUSION BLD/BLD COMPNT: CPT

## 2024-03-22 PROCEDURE — C9113 INJ PANTOPRAZOLE SODIUM, VIA: HCPCS | Performed by: NURSE PRACTITIONER

## 2024-03-22 PROCEDURE — 84484 ASSAY OF TROPONIN QUANT: CPT

## 2024-03-22 PROCEDURE — 83605 ASSAY OF LACTIC ACID: CPT

## 2024-03-22 PROCEDURE — 85025 COMPLETE CBC W/AUTO DIFF WBC: CPT

## 2024-03-22 PROCEDURE — 86923 COMPATIBILITY TEST ELECTRIC: CPT

## 2024-03-22 PROCEDURE — 2580000003 HC RX 258: Performed by: NURSE PRACTITIONER

## 2024-03-22 PROCEDURE — 6360000002 HC RX W HCPCS: Performed by: EMERGENCY MEDICINE

## 2024-03-22 PROCEDURE — 3609017100 HC EGD: Performed by: INTERNAL MEDICINE

## 2024-03-22 PROCEDURE — 82803 BLOOD GASES ANY COMBINATION: CPT

## 2024-03-22 PROCEDURE — 99285 EMERGENCY DEPT VISIT HI MDM: CPT

## 2024-03-22 PROCEDURE — 86850 RBC ANTIBODY SCREEN: CPT

## 2024-03-22 PROCEDURE — 2580000003 HC RX 258: Performed by: EMERGENCY MEDICINE

## 2024-03-22 PROCEDURE — P9016 RBC LEUKOCYTES REDUCED: HCPCS

## 2024-03-22 PROCEDURE — 82270 OCCULT BLOOD FECES: CPT

## 2024-03-22 PROCEDURE — 2500000003 HC RX 250 WO HCPCS: Performed by: NURSE ANESTHETIST, CERTIFIED REGISTERED

## 2024-03-22 PROCEDURE — 7100000010 HC PHASE II RECOVERY - FIRST 15 MIN: Performed by: INTERNAL MEDICINE

## 2024-03-22 PROCEDURE — 96374 THER/PROPH/DIAG INJ IV PUSH: CPT

## 2024-03-22 PROCEDURE — C9113 INJ PANTOPRAZOLE SODIUM, VIA: HCPCS | Performed by: EMERGENCY MEDICINE

## 2024-03-22 PROCEDURE — 86900 BLOOD TYPING SEROLOGIC ABO: CPT

## 2024-03-22 PROCEDURE — 7100000011 HC PHASE II RECOVERY - ADDTL 15 MIN: Performed by: INTERNAL MEDICINE

## 2024-03-22 PROCEDURE — 36415 COLL VENOUS BLD VENIPUNCTURE: CPT

## 2024-03-22 PROCEDURE — 87636 SARSCOV2 & INF A&B AMP PRB: CPT

## 2024-03-22 PROCEDURE — 71045 X-RAY EXAM CHEST 1 VIEW: CPT

## 2024-03-22 PROCEDURE — 6360000002 HC RX W HCPCS: Performed by: NURSE ANESTHETIST, CERTIFIED REGISTERED

## 2024-03-22 PROCEDURE — 80053 COMPREHEN METABOLIC PANEL: CPT

## 2024-03-22 PROCEDURE — 93005 ELECTROCARDIOGRAM TRACING: CPT | Performed by: EMERGENCY MEDICINE

## 2024-03-22 PROCEDURE — 85018 HEMOGLOBIN: CPT

## 2024-03-22 PROCEDURE — 2709999900 HC NON-CHARGEABLE SUPPLY: Performed by: INTERNAL MEDICINE

## 2024-03-22 PROCEDURE — 0DJ08ZZ INSPECTION OF UPPER INTESTINAL TRACT, VIA NATURAL OR ARTIFICIAL OPENING ENDOSCOPIC: ICD-10-PCS | Performed by: INTERNAL MEDICINE

## 2024-03-22 PROCEDURE — 2060000000 HC ICU INTERMEDIATE R&B

## 2024-03-22 PROCEDURE — 93010 ELECTROCARDIOGRAM REPORT: CPT | Performed by: INTERNAL MEDICINE

## 2024-03-22 RX ORDER — PANTOPRAZOLE SODIUM 40 MG/10ML
80 INJECTION, POWDER, LYOPHILIZED, FOR SOLUTION INTRAVENOUS ONCE
Status: COMPLETED | OUTPATIENT
Start: 2024-03-22 | End: 2024-03-22

## 2024-03-22 RX ORDER — LIDOCAINE HYDROCHLORIDE 20 MG/ML
INJECTION, SOLUTION INFILTRATION; PERINEURAL PRN
Status: DISCONTINUED | OUTPATIENT
Start: 2024-03-22 | End: 2024-03-22 | Stop reason: SDUPTHER

## 2024-03-22 RX ORDER — PROPOFOL 10 MG/ML
INJECTION, EMULSION INTRAVENOUS PRN
Status: DISCONTINUED | OUTPATIENT
Start: 2024-03-22 | End: 2024-03-22 | Stop reason: SDUPTHER

## 2024-03-22 RX ORDER — DEXTROSE MONOHYDRATE 100 MG/ML
INJECTION, SOLUTION INTRAVENOUS CONTINUOUS PRN
Status: DISCONTINUED | OUTPATIENT
Start: 2024-03-22 | End: 2024-03-27 | Stop reason: HOSPADM

## 2024-03-22 RX ORDER — ONDANSETRON 4 MG/1
4 TABLET, ORALLY DISINTEGRATING ORAL EVERY 8 HOURS PRN
Status: DISCONTINUED | OUTPATIENT
Start: 2024-03-22 | End: 2024-03-27 | Stop reason: HOSPADM

## 2024-03-22 RX ORDER — SODIUM CHLORIDE, SODIUM LACTATE, POTASSIUM CHLORIDE, CALCIUM CHLORIDE 600; 310; 30; 20 MG/100ML; MG/100ML; MG/100ML; MG/100ML
INJECTION, SOLUTION INTRAVENOUS CONTINUOUS
Status: ACTIVE | OUTPATIENT
Start: 2024-03-22 | End: 2024-03-23

## 2024-03-22 RX ORDER — SODIUM CHLORIDE 9 MG/ML
INJECTION, SOLUTION INTRAVENOUS PRN
Status: DISCONTINUED | OUTPATIENT
Start: 2024-03-22 | End: 2024-03-27 | Stop reason: HOSPADM

## 2024-03-22 RX ORDER — SODIUM CHLORIDE, SODIUM LACTATE, POTASSIUM CHLORIDE, CALCIUM CHLORIDE 600; 310; 30; 20 MG/100ML; MG/100ML; MG/100ML; MG/100ML
INJECTION, SOLUTION INTRAVENOUS CONTINUOUS
Status: DISCONTINUED | OUTPATIENT
Start: 2024-03-22 | End: 2024-03-22

## 2024-03-22 RX ORDER — SODIUM CHLORIDE 0.9 % (FLUSH) 0.9 %
5-40 SYRINGE (ML) INJECTION EVERY 12 HOURS SCHEDULED
Status: DISCONTINUED | OUTPATIENT
Start: 2024-03-22 | End: 2024-03-27 | Stop reason: HOSPADM

## 2024-03-22 RX ORDER — ONDANSETRON 2 MG/ML
4 INJECTION INTRAMUSCULAR; INTRAVENOUS EVERY 6 HOURS PRN
Status: DISCONTINUED | OUTPATIENT
Start: 2024-03-22 | End: 2024-03-27 | Stop reason: HOSPADM

## 2024-03-22 RX ORDER — INSULIN LISPRO 100 [IU]/ML
0-4 INJECTION, SOLUTION INTRAVENOUS; SUBCUTANEOUS EVERY 4 HOURS
Status: DISCONTINUED | OUTPATIENT
Start: 2024-03-22 | End: 2024-03-27 | Stop reason: HOSPADM

## 2024-03-22 RX ORDER — GLUCAGON 1 MG/ML
1 KIT INJECTION PRN
Status: DISCONTINUED | OUTPATIENT
Start: 2024-03-22 | End: 2024-03-27 | Stop reason: HOSPADM

## 2024-03-22 RX ORDER — ACETAMINOPHEN 325 MG/1
650 TABLET ORAL EVERY 6 HOURS PRN
Status: DISCONTINUED | OUTPATIENT
Start: 2024-03-22 | End: 2024-03-27 | Stop reason: HOSPADM

## 2024-03-22 RX ORDER — SODIUM CHLORIDE 0.9 % (FLUSH) 0.9 %
5-40 SYRINGE (ML) INJECTION PRN
Status: DISCONTINUED | OUTPATIENT
Start: 2024-03-22 | End: 2024-03-27 | Stop reason: HOSPADM

## 2024-03-22 RX ORDER — ACETAMINOPHEN 650 MG/1
650 SUPPOSITORY RECTAL EVERY 6 HOURS PRN
Status: DISCONTINUED | OUTPATIENT
Start: 2024-03-22 | End: 2024-03-27 | Stop reason: HOSPADM

## 2024-03-22 RX ADMIN — PANTOPRAZOLE SODIUM 80 MG: 40 INJECTION, POWDER, FOR SOLUTION INTRAVENOUS at 12:25

## 2024-03-22 RX ADMIN — PROPOFOL 25 MG: 10 INJECTION, EMULSION INTRAVENOUS at 16:45

## 2024-03-22 RX ADMIN — SODIUM CHLORIDE, POTASSIUM CHLORIDE, SODIUM LACTATE AND CALCIUM CHLORIDE: 600; 310; 30; 20 INJECTION, SOLUTION INTRAVENOUS at 19:44

## 2024-03-22 RX ADMIN — LIDOCAINE HYDROCHLORIDE 50 MG: 20 INJECTION, SOLUTION INFILTRATION; PERINEURAL at 16:43

## 2024-03-22 RX ADMIN — SODIUM CHLORIDE, POTASSIUM CHLORIDE, SODIUM LACTATE AND CALCIUM CHLORIDE: 600; 310; 30; 20 INJECTION, SOLUTION INTRAVENOUS at 15:43

## 2024-03-22 RX ADMIN — PROPOFOL 25 MG: 10 INJECTION, EMULSION INTRAVENOUS at 16:48

## 2024-03-22 RX ADMIN — PANTOPRAZOLE SODIUM 8 MG/HR: 40 INJECTION, POWDER, FOR SOLUTION INTRAVENOUS at 20:26

## 2024-03-22 RX ADMIN — SODIUM CHLORIDE, POTASSIUM CHLORIDE, SODIUM LACTATE AND CALCIUM CHLORIDE: 600; 310; 30; 20 INJECTION, SOLUTION INTRAVENOUS at 16:40

## 2024-03-22 RX ADMIN — PROPOFOL 25 MG: 10 INJECTION, EMULSION INTRAVENOUS at 16:46

## 2024-03-22 RX ADMIN — PROPOFOL 25 MG: 10 INJECTION, EMULSION INTRAVENOUS at 16:44

## 2024-03-22 RX ADMIN — PANTOPRAZOLE SODIUM 8 MG/HR: 40 INJECTION, POWDER, FOR SOLUTION INTRAVENOUS at 12:26

## 2024-03-22 ASSESSMENT — PAIN SCALES - GENERAL
PAINLEVEL_OUTOF10: 0
PAINLEVEL_OUTOF10: 0

## 2024-03-22 ASSESSMENT — PAIN - FUNCTIONAL ASSESSMENT: PAIN_FUNCTIONAL_ASSESSMENT: NONE - DENIES PAIN

## 2024-03-22 NOTE — PROGRESS NOTES
Patient arrived to A2 room 203 with all belongings. VSS. Patient oriented to room and updated on procedures for the day. Bed in locked, lowest position with alarm on. Call light within reach.

## 2024-03-22 NOTE — PROGRESS NOTES
Patient received from endo, awake and oriented, denies pain or nausea.  States no family here right now.,

## 2024-03-22 NOTE — OP NOTE
Endoscopy Note    Patient: Ron Chirinos  : 1940  Acct#:     Procedure: Esophagogastroduodenoscopy                         Date:  3/22/2024     Surgeon:   Deborah Ojeda MD    Referring Physician:  Marlo Pichardo MD    Indications: 84 yo man with multiple medical problems presented with weakness and melena found to have hgb of 7.     Postoperative Diagnosis:    LA grade C esophagitis  Hiatal hernia  Old blood in the stomach    Anesthesia:  MAC, see anesthesia documentation     Consent:  The patient or their legal guardian has signed an informed consent, and is aware of the potential risks, benefits, alternatives, and potential complications of this procedure.  These include, but are not limited to hemorrhage, bleeding, post procedural pain, perforation, phlebitis, aspiration, hypotension, hypoxia, cardiovascular events such as arryhthmia, and possibly death.     Description of Procedure: The patient was then taken to the endoscopy suite, placed in the left lateral decubitus position and the above IV sedation was administrered.    The Olympus video endoscope was placed through the patient's oropharynx without difficulty to the extent of the 2nd portion of the duodenum.  Both forward and retroflexed views of the stomach were obtained.      Findings:    Esophagus: LA grade C esophagitis. No esophageal strictures. 5cm hiatal hernia.    Stomach: Moderate amount of old blood in the stomach obscuring the view of approximately a third of the stomach. The stomach otherwise appeared normal on forward and retroflexed views.  Duodenum: Old blood in the duodenum. The first and 2nd portions of the duodenum appeared normal with normal villous pattern    Estimated Blood Loss (mL): minimal     Complications: None    Specimens: * No specimens in log *    The scope was then withdrawn back into the stomach, it was decompressed, and the scope was completely withdrawn.    The patient tolerated the procedure well

## 2024-03-22 NOTE — ANESTHESIA POSTPROCEDURE EVALUATION
Department of Anesthesiology  Postprocedure Note    Patient: Ron Chirinos  MRN: [id number]  YOB: 1940  Date of evaluation: 3/22/2024    Procedure Summary       Date: 03/22/24 Room / Location: [place] [address] / [facility]    Anesthesia Start: 1640 Anesthesia Stop: 1653    Procedure: ESOPHAGOGASTRODUODENOSCOPY DIAGNOSTIC ONLY Diagnosis:       Melena      (Melena [K92.1])    Surgeons: Deborah Ojeda MD Responsible Provider: Junior Chiu MD    Anesthesia Type: general ASA Status: 4            Anesthesia Type: No value filed.    Chip Phase I: Chip Score: 10    Chip Phase II: Chip Score: 9    Anesthesia Post Evaluation    Patient location during evaluation: PACU  Patient participation: complete - patient participated  Level of consciousness: awake and alert  Airway patency: patent  Nausea & Vomiting: no nausea and no vomiting  Cardiovascular status: hemodynamically stable  Respiratory status: acceptable  Hydration status: euvolemic  Multimodal analgesia pain management approach  Pain management: satisfactory to patient    No notable events documented.

## 2024-03-22 NOTE — CARE COORDINATION
Case Management Assessment  Initial Evaluation    Date/Time of Evaluation: 3/22/2024 1:02 PM  Assessment Completed by: KATERYNA Moore    If patient is discharged prior to next notation, then this note serves as note for discharge by case management.    Patient Name: Ron Chirinos                   YOB: 1940  Diagnosis: GIB (gastrointestinal bleeding) [K92.2]                   Date / Time: 3/22/2024 10:31 AM    Patient Admission Status: Inpatient   Readmission Risk (Low < 19, Mod (19-27), High > 27): Readmission Risk Score: 14.3    Current PCP: Marlo Pichardo MD  PCP verified by CM? (P) Yes    Chart Reviewed: Yes      History Provided by: (P) Patient  Patient Orientation: (P) Alert and Oriented    Patient Cognition: (P) Alert    Hospitalization in the last 30 days (Readmission):  No    If yes, Readmission Assessment in CM Navigator will be completed.    Advance Directives:      Code Status: Prior   Patient's Primary Decision Maker is: (P) Legal Next of Kin    Primary Decision Maker: Deja Brown  Child - [phone]    Discharge Planning:    Patient lives with: (P) Alone Type of Home: (P) Apartment  Primary Care Giver: (P) Self  Patient Support Systems include: (P) Children, Family Members   Current Financial resources: (P) None  Current community resources: (P) ECF/Home Care  Current services prior to admission: (P) Durable Medical Equipment            Current DME: (P) Cane            Type of Home Care services:  (P) PT, OT, Nursing Services    ADLS  Prior functional level: (P) Independent in ADLs/IADLs  Current functional level: (P) Assistance with the following:, Bathing, Dressing, Toileting, Mobility    PT AM-PAC:   /24  OT AM-PAC:   /24    Family can provide assistance at DC: (P) Yes  Would you like Case Management to discuss the discharge plan with any other family members/significant others, and if so, who? (P) No  Plans to Return to Present Housing: (P) Yes  Other Identified

## 2024-03-22 NOTE — CONSULTS
Consultation Note    Patient Name: Ron Chirinos  : 1940  Age: 83 y.o.     Admitting Physician: Don Malone MD   Date of Admission: 3/22/2024 10:31 AM   Primary Care Physician: Marlo Pichardo MD        Ron Chirinos is being seen at the request of Don Malone MD for melena, weakness.    History of Present Illness:  83-year-old male with past medical history significant for CAD, diabetes mellitus, hypertension, and cerebral infarct.  No prior abdominal surgeries noted.    Patient presented to the emergency room with complaints of new severe weakness that he noted this morning as well as melena.  He reports yesterday evening he was feeling well and had a normal bowel movement around 11 PM.  This morning he woke up feeling weak and had fecal urgency.  When he went to the restroom he noted that his stool was loose and black.  He called EMS due to feeling unwell.  Reportedly he was hypotensive on their arrival.   In the emergency room he was found to have a hemoglobin of 7 which is down from baseline of 10 noted from last year.  Also noted to have markedly elevated BUN at 94.  He denies a prior history of GI bleeding.  He does take a 325 mg aspirin daily but denies additional NSAID use.  He denies any dysphagia, heartburn, abdominal pain, or nausea/vomiting.  He has never had an EGD.  He reports having a colonoscopy several years ago.    GI History:  Patient reports colonoscopy several years ago.  Unable to view report.    Past Medical History:  Past Medical History:   Diagnosis Date    CAD (coronary artery disease)     Diabetes mellitus (HCC)     Hypertension     Unspecified cerebral artery occlusion with cerebral infarction         Past Surgical History:  Past Surgical History:   Procedure Laterality Date    CARDIAC SURGERY      5 vessel bypass    CARDIAC SURGERY N/A 2023    TRANSCATHETER AORTIC VALVE REPLACEMENT FEMORAL APPROACH performed by Naeem Feliciano MD at [facility]

## 2024-03-22 NOTE — ED PROVIDER NOTES
[facility]  ED     EMERGENCY DEPARTMENT ENCOUNTER            Pt Name: Ron Chirinos   MRN: [id number]   Birthdate 1940   Date of evaluation: 3/22/2024   Provider: Earl Cloud II, DO   PCP: Marlo Pichardo MD   Note Started: 1:29 PM EDT 3/22/24          CHIEF COMPLAINT     Chief Complaint   Patient presents with    Chest Pain     911 called for chest pain, hypotensive and hypoxic at 78% at scene, 4L placed brought patient up to 100%, no home oxygen, lives alone             HISTORY OF PRESENT ILLNESS:   History from : Patient and squad  Limitations to history : None     Ron Chirinos is a 83 y.o. male who presents to the emergency department with reported chest pain.  Patient states that he was within normal limits until late last night when he became increasingly fatigued and short of breath.  Patient reports that he was having difficulty even ambulating across the room.  Today, patient noted mild substernal chest pain/pressure and called squad.  Upon further questioning, patient states that he had a black stool this morning.  Upon their arrival patient was hypotensive with questionable hypoxia as well.(I suspect that squad was unable to obtain a true pulse ox given final diagnosis of acute GI bleed with anemia).  Patient arrives into the emergency department alert and oriented.  Minimal chest discomfort at this time.  Patient's O2 sats are 100% on 2 L and patient's pressure has improved significantly since arrival.    Nursing Notes were all reviewed and agreed with, or any disagreements were addressed in the HPI.     REVIEW OF SYSTEMS :    Positives and Pertinent negatives as per HPI.      MEDICAL HISTORY   has a past medical history of CAD (coronary artery disease), Diabetes mellitus (HCC), Hypertension, and Unspecified cerebral artery occlusion with cerebral infarction.    Past Surgical History:   Procedure Laterality Date    CARDIAC SURGERY  2002    5 vessel bypass    CARDIAC    Temp:       TempSrc:       SpO2: 100% 100% 100% 98%   Weight:                 Is this patient to be included in the SEP-1 Core Measure due to severe sepsis or septic shock?   No   Exclusion criteria - the patient is NOT to be included for SEP-1 Core Measure due to:  Infection is not suspected       CC/HPI Summary, DDx, ED Course, and Reassessment:     Patient presents to the emergency department complaining of chest pain.  Noted hypotension as well as potential hypoxia noted by squad.  Saline initiated in route.  Patient arrives into the emergency department alert and oriented with minimal chest discomfort.  On further discussion patient reports black stool.  On exam, patient's vitals appear stable at this time.  Heart and lung exam are stable.  Abdomen is soft and nontender.  Rectal is concerning for melena.  Basic labs consistent with acute blood loss anemia with hemoglobin dropping from approximately 13 to 7 over the last 4 months.  CKD with significantly elevated BUN noted.  Protonix bolus and drip initiated.  GI consulted.  Patient will be admitted for further evaluation and treatment.       Patient was given the following medications:   Medications   pantoprazole (PROTONIX) 80 mg in sodium chloride 0.9 % 100 mL infusion (8 mg/hr IntraVENous New Bag 3/22/24 1226)   glucose chewable tablet 16 g (has no administration in time range)   dextrose bolus 10% 125 mL (has no administration in time range)     Or   dextrose bolus 10% 250 mL (has no administration in time range)   glucagon injection 1 mg (has no administration in time range)   dextrose 10 % infusion (has no administration in time range)   insulin lispro (HUMALOG) injection vial 0-4 Units (has no administration in time range)   pantoprazole (PROTONIX) injection 80 mg (80 mg IntraVENous Given 3/22/24 1225)        CONSULTS:   IP CONSULT TO GI  IP CONSULT TO CARDIOLOGY   Discussion with Other Professionals: None   {Social Determinants: Patient lives

## 2024-03-22 NOTE — ED NOTES
Report given to VANIA De La Cruz A2; report called, questions answered, care transferred. VSS for transport. Pt in NAD, R even and unlabored. Waiting for transport member to bring to floor.

## 2024-03-22 NOTE — ACP (ADVANCE CARE PLANNING)
Advance Care Planning     General Advance Care Planning (ACP) Conversation    Date of Conversation: 3/22/2024  Conducted with: Patient with Decision Making Capacity    Healthcare Decision Maker:    Primary Decision Maker: Deja Brown - Child - [phone]  Click here to complete Healthcare Decision Makers including selection of the Healthcare Decision Maker Relationship (ie \"Primary\").   Today we documented Decision Maker(s) consistent with Legal Next of Kin hierarchy.    Content/Action Overview:  Has NO ACP documents-Information provided          Length of Voluntary ACP Conversation in minutes:  <16 minutes (Non-Billable)    KATERYNA Moore

## 2024-03-22 NOTE — ANESTHESIA PRE PROCEDURE
Department of Anesthesiology  Preprocedure Note       Name:  Ron Chirinos   Age:  83 y.o.  :  1940                                          MRN:  [id number]         Date:  3/22/2024      Surgeon: Surgeon(s):  Deborah Ojeda MD    Procedure: Procedure(s):  ESOPHAGOGASTRODUODENOSCOPY DIAGNOSTIC ONLY    Medications prior to admission:   Prior to Admission medications    Medication Sig Start Date End Date Taking? Authorizing Provider   lisinopril (PRINIVIL;ZESTRIL) 10 MG tablet Take 1 tablet by mouth once daily 23   Naeem Feliciano MD   amLODIPine (NORVASC) 5 MG tablet Take 5mg once a day 23   Naeem Feliciano MD   furosemide (LASIX) 20 MG tablet Take 1 tablet by mouth as needed (as needed for edema) 23   Naeem Feliciano MD   FARXIGA 10 MG tablet  23   ProviderAlex MD   atorvastatin (LIPITOR) 20 MG tablet Take 1 tablet by mouth daily 23   Babar Hong DO   aspirin 81 MG chewable tablet Take 1 tablet by mouth daily 4/15/23   Darrell Carvalho MD   carvedilol (COREG) 3.125 MG tablet Take 1 tablet by mouth 2 times daily (with meals) 23   Darrell Carvalho MD   Multiple Vitamins-Minerals (THERAPEUTIC MULTIVITAMIN-MINERALS) tablet Take 1 tablet by mouth daily    ProviderAlex MD       Current medications:    Current Facility-Administered Medications   Medication Dose Route Frequency Provider Last Rate Last Admin   • pantoprazole (PROTONIX) 80 mg in sodium chloride 0.9 % 100 mL infusion  8 mg/hr IntraVENous Continuous Sha Hatfield APRN - CNP 10 mL/hr at 24 1226 8 mg/hr at 24 1226   • sodium chloride flush 0.9 % injection 5-40 mL  5-40 mL IntraVENous 2 times per day Sha Hatfield APRN - CNP       • sodium chloride flush 0.9 % injection 5-40 mL  5-40 mL IntraVENous PRN Sha Hatfield APRN - CNP       • 0.9 % sodium chloride infusion   IntraVENous PRN Sha Hatfield APRN - CNP       • ondansetron (ZOFRAN-ODT) disintegrating tablet 4 mg  4 
No

## 2024-03-22 NOTE — ED NOTES
1158 - called [facility]  RE [facility]  1217 - Dr. Ojeda called back, transferred to Dr. Cloud

## 2024-03-22 NOTE — H&P
Hospital Medicine History & Physical      Date of Admission: 3/22/2024    Date of Service:  Pt seen/examined on 3/22/24     [x]Admitted to Inpatient with expected LOS greater than two midnights due to medical therapy.  []Placed in Observation status.    Chief Admission Complaint:  GIB    Presenting Admission History:      83 y.o. male with a PMH of CAD s/p CABG,HLD, DM, HTN, AVS s/p TAVR, and CVA who presented to [facility] with a complaint of episode of black loose stool this am. He reports in usual state of health until this morning. He denies N/V/F/C or abd pain. Patient lives independently. No hx of gib, he is on aspirin 81mg daily, recently stopped Eliquis.    In ED, patient a/o, pale, hgb 7.0, +fobt, ecg afib, BUN 94/scr 1.6, GI consulted in ED, Trop 91-91, CXR stable. PPI gtt started. 1 L bolus adm.     Assessment/Plan:      Current Principal Problem:  GIB (gastrointestinal bleeding)    GIB  -+FOBT  -GI consulted  -PPI gtt  -NPO  -h&h q6  -Tele  -hgb 7, transfuse <7  -BUN 94  -Iron studies    HTN-hold lisinopril, amlodipine, coreg    HLD-resume statin when taking po    Chronic diastolic heart failure  -hold lasix    Troponin elevation -   [x] Trop leak 2nd to CKD/ESRD  [x] Demand ischemia w/out MI     CAD s/p CABG, AVS s/p TAVR: Echo 2/21/2023 EF of 60-65% with moderate-severe AI and severe AS. [facility] 4/23 severe native CAD with  of ostial LM and mid RCA; patent grafts (LIMA-LAD, SVG-diag, and SVG-RPDA). Carotid duplex 4/8/23 showed <50% stenosis bilaterally (no change 2015). S/P TAVR 8/29/2023. ECHO 11/23 EF=60%    PAF-eliquis stopped 1/24/24 per cardiology. Cardiology consulted-follows with Dr. Mitchell    Discussed management and the need for Hospitalization of the patient w/ the Emergency Department Provider    CXR: I have reviewed the CXR with the following interpretation: No radiographic evidence of acute cardiopulmonary pathology    EKG:  I have reviewed the EKG with the following interpretation:  afib    Physical Exam Performed:      /60   Pulse 77   Temp 97.7 °F (36.5 °C) (Oral)   Resp 14   Wt 67.1 kg (148 lb)   SpO2 100%   BMI 20.65 kg/m²     General appearance:  No apparent distress, appears stated age and cooperative.  HEENT:  Pupils equal, round, and reactive to light. Conjunctivae/corneas clear.  Respiratory:  Normal respiratory effort. Clear to auscultation, bilaterally without Rales/Wheezes/Rhonchi.  Cardiovascular:  irregular rate and rhythm with normal S1/S2 without murmurs, rubs or gallops.  Abdomen:  Soft, non-tender, non-distended with normal bowel sounds.  Musculoskeletal:  No clubbing, cyanosis or edema bilaterally.  Full range of motion without deformity.  Neurologic:  Neurovascularly intact without any focal sensory/motor deficits. Cranial nerves: II-XII intact, grossly non-focal.  Psychiatric:  Alert and oriented, thought content appropriate, normal insight  Skin:  Skin color-pale  Capillary Refill:  Brisk,< 3 seconds   Peripheral Pulses:  +2 palpable, equal bilaterally     Diet: []Adult/General  []Cardiac  []Diabetic  []Low Fat  [x]NPO  []NPO after Midnight  []Other   DVT Prophylaxis: []PPx LMWH  []SQ Heparin  [x]IPC/SCDs  []Eliquis  []Xarelto  []Coumadin     Code status: [x]Full  []DNR/CCA  []Limited (DNR/CCA with Do Not Intubate)  []DNRCC  PT/OT Eval Status:   [x]NOT yet ordered  []Ordered and Pending   []Seen with Recommendations for:  []Home independently  []Home w/ assist  []HHC  []SNF  []Acute Rehab    Anticipated Discharge Day/Date:  >3days    Anticipated Discharge Location: []Home  [x]HHC  []SNF  []Acute Rehab  []ECF  []LTAC  []Hospice    Consults:      IP CONSULT TO GI      This patient has a high likelihood of being discharged tomorrow and is appropriate for A1 Discharge Unit in AM pending clinical course overnight: []Yes

## 2024-03-23 LAB
ANION GAP SERPL CALCULATED.3IONS-SCNC: 10 MMOL/L (ref 3–16)
APTT BLD: 30.5 SEC (ref 22.7–35.9)
BLOOD BANK DISPENSE STATUS: NORMAL
BLOOD BANK PRODUCT CODE: NORMAL
BPU ID: NORMAL
BUN SERPL-MCNC: 82 MG/DL (ref 7–20)
CALCIUM SERPL-MCNC: 8.7 MG/DL (ref 8.3–10.6)
CHLORIDE SERPL-SCNC: 108 MMOL/L (ref 99–110)
CO2 SERPL-SCNC: 21 MMOL/L (ref 21–32)
CREAT SERPL-MCNC: 1.3 MG/DL (ref 0.8–1.3)
DESCRIPTION BLOOD BANK: NORMAL
GFR SERPLBLD CREATININE-BSD FMLA CKD-EPI: 54 ML/MIN/{1.73_M2}
GLUCOSE BLD-MCNC: 142 MG/DL (ref 70–99)
GLUCOSE BLD-MCNC: 142 MG/DL (ref 70–99)
GLUCOSE BLD-MCNC: 159 MG/DL (ref 70–99)
GLUCOSE BLD-MCNC: 215 MG/DL (ref 70–99)
GLUCOSE BLD-MCNC: 242 MG/DL (ref 70–99)
GLUCOSE SERPL-MCNC: 128 MG/DL (ref 70–99)
HCT VFR BLD AUTO: 19.9 % (ref 40.5–52.5)
HCT VFR BLD AUTO: 21.3 % (ref 40.5–52.5)
HCT VFR BLD AUTO: 22.3 % (ref 40.5–52.5)
HCT VFR BLD AUTO: 22.4 % (ref 40.5–52.5)
HCT VFR BLD AUTO: 24.8 % (ref 40.5–52.5)
HGB BLD-MCNC: 6.7 G/DL (ref 13.5–17.5)
HGB BLD-MCNC: 7.2 G/DL (ref 13.5–17.5)
HGB BLD-MCNC: 7.4 G/DL (ref 13.5–17.5)
HGB BLD-MCNC: 7.6 G/DL (ref 13.5–17.5)
HGB BLD-MCNC: 8.3 G/DL (ref 13.5–17.5)
INR PPP: 1.32 (ref 0.84–1.16)
IRON SATN MFR SERPL: 92 % (ref 20–50)
IRON SERPL-MCNC: 255 UG/DL (ref 59–158)
PERFORMED ON: ABNORMAL
POTASSIUM SERPL-SCNC: 3.8 MMOL/L (ref 3.5–5.1)
PROTHROMBIN TIME: 16.3 SEC (ref 11.5–14.8)
SODIUM SERPL-SCNC: 139 MMOL/L (ref 136–145)
TIBC SERPL-MCNC: 278 UG/DL (ref 260–445)

## 2024-03-23 PROCEDURE — 6370000000 HC RX 637 (ALT 250 FOR IP): Performed by: NURSE PRACTITIONER

## 2024-03-23 PROCEDURE — 83550 IRON BINDING TEST: CPT

## 2024-03-23 PROCEDURE — 83036 HEMOGLOBIN GLYCOSYLATED A1C: CPT

## 2024-03-23 PROCEDURE — 80048 BASIC METABOLIC PNL TOTAL CA: CPT

## 2024-03-23 PROCEDURE — 85730 THROMBOPLASTIN TIME PARTIAL: CPT

## 2024-03-23 PROCEDURE — 99222 1ST HOSP IP/OBS MODERATE 55: CPT | Performed by: INTERNAL MEDICINE

## 2024-03-23 PROCEDURE — 36415 COLL VENOUS BLD VENIPUNCTURE: CPT

## 2024-03-23 PROCEDURE — 2580000003 HC RX 258: Performed by: NURSE PRACTITIONER

## 2024-03-23 PROCEDURE — C9113 INJ PANTOPRAZOLE SODIUM, VIA: HCPCS | Performed by: NURSE PRACTITIONER

## 2024-03-23 PROCEDURE — 36430 TRANSFUSION BLD/BLD COMPNT: CPT

## 2024-03-23 PROCEDURE — 6360000002 HC RX W HCPCS: Performed by: NURSE PRACTITIONER

## 2024-03-23 PROCEDURE — 83540 ASSAY OF IRON: CPT

## 2024-03-23 PROCEDURE — 85610 PROTHROMBIN TIME: CPT

## 2024-03-23 PROCEDURE — 2060000000 HC ICU INTERMEDIATE R&B

## 2024-03-23 PROCEDURE — 85014 HEMATOCRIT: CPT

## 2024-03-23 PROCEDURE — 85018 HEMOGLOBIN: CPT

## 2024-03-23 RX ORDER — SODIUM CHLORIDE 9 MG/ML
INJECTION, SOLUTION INTRAVENOUS PRN
Status: DISCONTINUED | OUTPATIENT
Start: 2024-03-23 | End: 2024-03-27 | Stop reason: HOSPADM

## 2024-03-23 RX ORDER — M-VIT,TX,IRON,MINS/CALC/FOLIC 27MG-0.4MG
1 TABLET ORAL DAILY
Status: DISCONTINUED | OUTPATIENT
Start: 2024-03-23 | End: 2024-03-27 | Stop reason: HOSPADM

## 2024-03-23 RX ORDER — ATORVASTATIN CALCIUM 10 MG/1
20 TABLET, FILM COATED ORAL DAILY
Status: DISCONTINUED | OUTPATIENT
Start: 2024-03-23 | End: 2024-03-27 | Stop reason: HOSPADM

## 2024-03-23 RX ADMIN — ATORVASTATIN CALCIUM 20 MG: 10 TABLET, FILM COATED ORAL at 09:17

## 2024-03-23 RX ADMIN — Medication 1 TABLET: at 09:17

## 2024-03-23 RX ADMIN — PANTOPRAZOLE SODIUM 8 MG/HR: 40 INJECTION, POWDER, FOR SOLUTION INTRAVENOUS at 05:08

## 2024-03-23 NOTE — PLAN OF CARE
Problem: Discharge Planning  Goal: Discharge to home or other facility with appropriate resources  3/23/2024 1030 by Steve Agarwal, RN  Outcome: Progressing     Problem: Chronic Conditions and Co-morbidities  Goal: Patient's chronic conditions and co-morbidity symptoms are monitored and maintained or improved  3/23/2024 1030 by Steve Agarwal, RN  Outcome: Progressing     Problem: Pain  Goal: Verbalizes/displays adequate comfort level or baseline comfort level  3/23/2024 1030 by Steve Agarwal, RN  Outcome: Progressing     Problem: Safety - Adult  Goal: Free from fall injury  3/23/2024 1030 by Steve Agarwal, RN  Outcome: Progressing     Problem: ABCDS Injury Assessment  Goal: Absence of physical injury  Outcome: Progressing

## 2024-03-23 NOTE — PROGRESS NOTES
Hospital Medicine Progress Note      Date of Admission: 3/22/2024  Hospital Day: 2    Chief Admission Complaint:    Chief Complaint   Patient presents with    Chest Pain     911 called for chest pain, hypotensive and hypoxic at 78% at scene, 4L placed brought patient up to 100%, no home oxygen, lives alone         Subjective:      Patient telling me that he feels better today, denies any more bowel movements today.  Discussed EGD results with him as well, PPI infusion running.  Plan is for repeat EGD early next week    Presenting Admission History:       83 y.o. male with a PMH of CAD s/p CABG,HLD, DM, HTN, AVS s/p TAVR, and CVA who presented to [facility] with a complaint of episode of black loose stool this am. He reports in usual state of health until this morning. He denies N/V/F/C or abd pain. Patient lives independently. No hx of gib, he is on aspirin 81mg daily, recently stopped Eliquis.     In ED, patient a/o, pale, hgb 7.0, +fobt, ecg afib, BUN 94/scr 1.6, GI consulted in ED, Trop 91-91, CXR stable. PPI gtt started. 1 L bolus adm.      Assessment/Plan:       Current Principal Problem:  GIB (gastrointestinal bleeding)     GI bleed due to LA grade C esophagitis  - +FOBT, BUN 94 on admission  - GI consulted-s/p EGD on 3/22/2024 with Dr. Ojeda which noted LA grade C esophagitis, hiatal hernia, and old blood in the stomach.  - PPI gtt x 72 hours then decrease to BID  - Continue clear liquid diet per GI recs  - Plan for repeat EGD early next week  - Monitor H/H, transfuse <7  - Iron studies pending     HTN-hold lisinopril, amlodipine, coreg for now given GIB, pressures are stable     HLD with history of CAD-resumed home statin.  ASA currently on hold due to GI bleed.  Cardiology consulted, appreciate their recommendations.     Chronic diastolic heart failure. Hx of AS S/P TAVR  - TTE on 11/13/2023 with LV systolic function normal with EF at 60%.  Mild concentric LVH.  No regional wall motion abnormalities.   pantoprazole 8 mg/hr (03/23/24 0508)    sodium chloride      dextrose      sodium chloride      lactated ringers IV soln 60 mL/hr at 03/22/24 2026     Scheduled Medications    sodium chloride flush  5-40 mL IntraVENous 2 times per day    insulin lispro  0-4 Units SubCUTAneous Q4H     PRN Meds: sodium chloride flush, sodium chloride, ondansetron **OR** ondansetron, acetaminophen **OR** acetaminophen, glucose, dextrose bolus **OR** dextrose bolus, glucagon (rDNA), dextrose, sodium chloride     Labs:  Personally reviewed and interpreted for clinical significance.     Recent Labs     03/22/24  1036 03/22/24  1908 03/23/24  0030 03/23/24  0424   WBC 11.4*  --   --   --    HGB 7.0* 6.5* 7.4* 7.6*   HCT 21.3* 19.9* 22.4* 22.3*     --   --   --      Recent Labs     03/22/24  1036 03/23/24  0424    139   K 4.2 3.8    108   CO2 19* 21   BUN 94* 82*   CREATININE 1.6* 1.3   CALCIUM 8.6 8.7     Recent Labs     03/22/24  1036 03/22/24  1149   TROPHS 91* 91*     No results for input(s): \"LABA1C\" in the last 72 hours.  Recent Labs     03/22/24  1036   AST 14*   ALT 13   BILITOT 0.3   ALKPHOS 57     Recent Labs     03/23/24  0423   INR 1.32*       Urine Cultures:   Lab Results   Component Value Date/Time    LABURIN No growth at 18 to 36 hours 08/17/2023 11:08 AM     Blood Cultures:   Lab Results   Component Value Date/Time    BC No Growth after 4 days of incubation. 04/08/2023 11:50 AM     Lab Results   Component Value Date/Time    BLOODCULT2 No Growth after 4 days of incubation. 04/08/2023 11:50 AM     Organism: No results found for: \"ORG\"      Concepcion Garcia, APRN

## 2024-03-23 NOTE — PLAN OF CARE
Problem: Discharge Planning  Goal: Discharge to home or other facility with appropriate resources  Outcome: Progressing     Problem: Chronic Conditions and Co-morbidities  Goal: Patient's chronic conditions and co-morbidity symptoms are monitored and maintained or improved  Outcome: Progressing  Note: Patient with diagnosis of Diabetes. Active orders for ACHS glucose monitoring, SSI and Lantus. Instructed importance of following carb control diet to maintain stable gluocose levels.       Problem: Pain  Goal: Verbalizes/displays adequate comfort level or baseline comfort level  Outcome: Progressing     Problem: Safety - Adult  Goal: Free from fall injury  Outcome: Progressing    CHF Care Plan      Patient's EF (Ejection Fraction) is greater than 40%    Heart Failure Medications:  Diuretics:: None    (One of the following REQUIRED for EF </= 40%/SYSTOLIC FAILURE but MAY be used in EF% >40%/DIASTOLIC FAILURE)        ACE:: None        ARB:: None         ARNI:: None    (Beta Blockers)  NON- Evidenced Based Beta Blocker (for EF% >40%/DIASTOLIC FAILURE): None    Evidenced Based Beta Blocker::(REQUIRED for EF% <40%/SYSTOLIC FAILURE) None  ...................................................................................................................................................    Failed to redirect to the Timeline version of the [employer] SmartLink.      Intake/Output Summary (Last 24 hours) at 3/23/2024 0226  Last data filed at 3/23/2024 0139  Gross per 24 hour   Intake 1222.92 ml   Output 700 ml   Net 522.92 ml       Education Booklet Provided: yes    Comorbidities Reviewed Yes    Patient has a past medical history of CAD (coronary artery disease), Diabetes mellitus (HCC), Hypertension, and Unspecified cerebral artery occlusion with cerebral infarction.     >>For CHF and Comorbidity documentation on Education Time and Topics, please see Education Tab      Pt resting in bed at this time on room air. Pt denies

## 2024-03-23 NOTE — CONSENT
Informed Consent for Blood Component Transfusion Note    I have discussed with the patient the rationale for blood component transfusion; its benefits in treating or preventing fatigue, organ damage, or death; and its risk which includes mild transfusion reactions, rare risk of blood borne infection, or more serious but rare reactions. I have discussed the alternatives to transfusion, including the risk and consequences of not receiving transfusion. The patient had an opportunity to ask questions and had agreed to proceed with transfusion of blood components.    Electronically signed by Capo Ayala MD on 3/23/24 at 6:53 PM EDT

## 2024-03-23 NOTE — CONSULTS
CARDIOLOGY CONSULTATION        Patient Name: Ron Chirinos  Date of admission: 3/22/2024 10:31 AM  Admission Dx: Melena [K92.1]  Acute blood loss anemia [D62]  GIB (gastrointestinal bleeding) [K92.2]  Chronic kidney disease, unspecified CKD stage [N18.9]  Requesting Physician: Don Malone MD  Primary Care physician: Marlo Pichardo MD    Reason for Consultation/Chief Complait: Chest pain    History of Present Illness:     Ron Chirinos is a 83 y.o. man with CAD s/p CABG, AS s/p TAVR, h/o CVA, HTN, HLP, and DM admitted for melena and acute blood loss anemia with presenting hemoglobin of 8.  Patient denies presence of chest pain but reports worsening weakness, fatigue and SOB.  Patient is on ASA daily but he recently stopped Eliquis.  He was seen by GI who recommend EGD and IV PPI gtt.  Elevated troponin noted so cardiology consulted to assist with management.    Past Medical History:   has a past medical history of CAD (coronary artery disease), Diabetes mellitus (HCC), Hypertension, and Unspecified cerebral artery occlusion with cerebral infarction.    Surgical History:   has a past surgical history that includes Coronary angioplasty with stent; Cardiac surgery (2002); hernia repair; eye surgery (Left, 11/17/2016); Cardiac surgery (N/A, 8/29/2023); and Cardiac surgery (N/A, 8/29/2023).     Social History:   reports that he has never smoked. He has never used smokeless tobacco. He reports current alcohol use. He reports that he does not use drugs.     Family History:  family history includes Diabetes in his mother.      Home Medications:  Were reviewed and are listed in nursing record and/or below  Prior to Admission medications    Medication Sig Start Date End Date Taking? Authorizing Provider   lisinopril (PRINIVIL;ZESTRIL) 10 MG tablet Take 1 tablet by mouth once daily 12/12/23   Naeem Feliciano MD   amLODIPine (NORVASC) 5 MG tablet Take 5mg once a day 11/13/23   Naeem Feliciano MD

## 2024-03-23 NOTE — CONSENT
Informed Consent for Blood Component Transfusion Note    I have discussed with the patient the rationale for blood component transfusion; its benefits in treating or preventing fatigue, organ damage, or death; and its risk which includes mild transfusion reactions, rare risk of blood borne infection, or more serious but rare reactions. I have discussed the alternatives to transfusion, including the risk and consequences of not receiving transfusion. The patient had an opportunity to ask questions and had agreed to proceed with transfusion of blood components.    Electronically signed by CARL Gee CNP on 3/22/24 at 8:21 PM EDT

## 2024-03-23 NOTE — PROGRESS NOTES
Progress Note    Patient Ron Chirinos  MRN: [id number]  YOB: 1940 Age: 83 y.o. Sex: male  Room: [address] [place] [address]       Admitting Physician: Don Malone MD   Date of Admission: 3/22/2024 10:31 AM   Primary Care Physician: Marlo Pichardo MD     Subjective:  Ron Chirinos was seen and examined. We are following for upper GI bleed.  -- Denies vomiting or bowel movements today  -- denies abdominal pain    ROS:  Constitutional: Denies fever, no change in appetite  Respiratory: Denies cough or shortness of breath  Cardiovascular: Denies chest pain or edema    Objective:  Vital Signs:   Vitals:    03/23/24 0815   BP: 134/83   Pulse: 77   Resp: 18   Temp: 97.7 °F (36.5 °C)   SpO2: 100%         Physical Exam:  Constitutional: Alert and oriented x 4. No acute distress.   Respiratory: Respirations nonlabored, no crepitus  GI: Abdomen nondistended, soft, and nontender.    Neurological: No focal deficits noted. No asterixis.    Intake/Output:    Intake/Output Summary (Last 24 hours) at 3/23/2024 1016  Last data filed at 3/23/2024 0508  Gross per 24 hour   Intake 1342.92 ml   Output 1375 ml   Net -32.08 ml        Current Medications:  Current Facility-Administered Medications   Medication Dose Route Frequency Provider Last Rate Last Admin    atorvastatin (LIPITOR) tablet 20 mg  20 mg Oral Daily Concepcion Garcia Pretty, APRN   20 mg at 03/23/24 0917    therapeutic multivitamin-minerals 1 tablet  1 tablet Oral Daily Concepcion Garciau, APRN   1 tablet at 03/23/24 0917    pantoprazole (PROTONIX) 80 mg in sodium chloride 0.9 % 100 mL infusion  8 mg/hr IntraVENous Continuous Sha Hatfield APRN - CNP 10 mL/hr at 03/23/24 0508 8 mg/hr at 03/23/24 0508    sodium chloride flush 0.9 % injection 5-40 mL  5-40 mL IntraVENous 2 times per day Sha Hatfield APRN - CNP        sodium chloride flush 0.9 % injection 5-40 mL  5-40 mL IntraVENous PRN Hatfield, Sha, APRN - CNP        0.9 % sodium chloride infusion   IntraVENous PRN Hatfield,

## 2024-03-24 LAB
ANION GAP SERPL CALCULATED.3IONS-SCNC: 10 MMOL/L (ref 3–16)
BASOPHILS # BLD: 0 K/UL (ref 0–0.2)
BASOPHILS NFR BLD: 0.7 %
BLOOD BANK DISPENSE STATUS: NORMAL
BLOOD BANK PRODUCT CODE: NORMAL
BPU ID: NORMAL
BUN SERPL-MCNC: 76 MG/DL (ref 7–20)
CALCIUM SERPL-MCNC: 8.2 MG/DL (ref 8.3–10.6)
CHLORIDE SERPL-SCNC: 108 MMOL/L (ref 99–110)
CO2 SERPL-SCNC: 21 MMOL/L (ref 21–32)
CREAT SERPL-MCNC: 1.7 MG/DL (ref 0.8–1.3)
DEPRECATED RDW RBC AUTO: 15.5 % (ref 12.4–15.4)
DESCRIPTION BLOOD BANK: NORMAL
EKG ATRIAL RATE: 89 BPM
EKG DIAGNOSIS: NORMAL
EKG Q-T INTERVAL: 436 MS
EKG QRS DURATION: 130 MS
EKG QTC CALCULATION (BAZETT): 524 MS
EKG R AXIS: -16 DEGREES
EKG T AXIS: 118 DEGREES
EKG VENTRICULAR RATE: 87 BPM
EOSINOPHIL # BLD: 0.1 K/UL (ref 0–0.6)
EOSINOPHIL NFR BLD: 1.1 %
EST. AVERAGE GLUCOSE BLD GHB EST-MCNC: 116.9 MG/DL
GFR SERPLBLD CREATININE-BSD FMLA CKD-EPI: 39 ML/MIN/{1.73_M2}
GLUCOSE BLD-MCNC: 174 MG/DL (ref 70–99)
GLUCOSE BLD-MCNC: 176 MG/DL (ref 70–99)
GLUCOSE BLD-MCNC: 188 MG/DL (ref 70–99)
GLUCOSE BLD-MCNC: 215 MG/DL (ref 70–99)
GLUCOSE SERPL-MCNC: 162 MG/DL (ref 70–99)
HBA1C MFR BLD: 5.7 %
HCT VFR BLD AUTO: 18.8 % (ref 40.5–52.5)
HCT VFR BLD AUTO: 21.9 % (ref 40.5–52.5)
HCT VFR BLD AUTO: 22.7 % (ref 40.5–52.5)
HGB BLD-MCNC: 6.4 G/DL (ref 13.5–17.5)
HGB BLD-MCNC: 7.5 G/DL (ref 13.5–17.5)
HGB BLD-MCNC: 7.6 G/DL (ref 13.5–17.5)
LYMPHOCYTES # BLD: 1.7 K/UL (ref 1–5.1)
LYMPHOCYTES NFR BLD: 23.5 %
MCH RBC QN AUTO: 31.8 PG (ref 26–34)
MCHC RBC AUTO-ENTMCNC: 33.9 G/DL (ref 31–36)
MCV RBC AUTO: 93.7 FL (ref 80–100)
MONOCYTES # BLD: 0.8 K/UL (ref 0–1.3)
MONOCYTES NFR BLD: 11.5 %
NEUTROPHILS # BLD: 4.7 K/UL (ref 1.7–7.7)
NEUTROPHILS NFR BLD: 63.2 %
PERFORMED ON: ABNORMAL
PLATELET # BLD AUTO: 100 K/UL (ref 135–450)
PMV BLD AUTO: 8.8 FL (ref 5–10.5)
POTASSIUM SERPL-SCNC: 4.3 MMOL/L (ref 3.5–5.1)
RBC # BLD AUTO: 2.01 M/UL (ref 4.2–5.9)
SODIUM SERPL-SCNC: 139 MMOL/L (ref 136–145)
WBC # BLD AUTO: 7.4 K/UL (ref 4–11)

## 2024-03-24 PROCEDURE — C9113 INJ PANTOPRAZOLE SODIUM, VIA: HCPCS | Performed by: NURSE PRACTITIONER

## 2024-03-24 PROCEDURE — 93005 ELECTROCARDIOGRAM TRACING: CPT | Performed by: NURSE PRACTITIONER

## 2024-03-24 PROCEDURE — 93010 ELECTROCARDIOGRAM REPORT: CPT | Performed by: INTERNAL MEDICINE

## 2024-03-24 PROCEDURE — 6370000000 HC RX 637 (ALT 250 FOR IP): Performed by: NURSE PRACTITIONER

## 2024-03-24 PROCEDURE — 85025 COMPLETE CBC W/AUTO DIFF WBC: CPT

## 2024-03-24 PROCEDURE — 36415 COLL VENOUS BLD VENIPUNCTURE: CPT

## 2024-03-24 PROCEDURE — 85018 HEMOGLOBIN: CPT

## 2024-03-24 PROCEDURE — 99232 SBSQ HOSP IP/OBS MODERATE 35: CPT | Performed by: NURSE PRACTITIONER

## 2024-03-24 PROCEDURE — 85014 HEMATOCRIT: CPT

## 2024-03-24 PROCEDURE — 2580000003 HC RX 258: Performed by: NURSE PRACTITIONER

## 2024-03-24 PROCEDURE — 2060000000 HC ICU INTERMEDIATE R&B

## 2024-03-24 PROCEDURE — 80048 BASIC METABOLIC PNL TOTAL CA: CPT

## 2024-03-24 PROCEDURE — 6360000002 HC RX W HCPCS: Performed by: NURSE PRACTITIONER

## 2024-03-24 PROCEDURE — 36430 TRANSFUSION BLD/BLD COMPNT: CPT

## 2024-03-24 RX ORDER — SODIUM CHLORIDE 9 MG/ML
INJECTION, SOLUTION INTRAVENOUS PRN
Status: DISCONTINUED | OUTPATIENT
Start: 2024-03-24 | End: 2024-03-27 | Stop reason: HOSPADM

## 2024-03-24 RX ADMIN — Medication 1 LOZENGE: at 13:35

## 2024-03-24 RX ADMIN — SODIUM CHLORIDE, PRESERVATIVE FREE 10 ML: 5 INJECTION INTRAVENOUS at 08:03

## 2024-03-24 RX ADMIN — PANTOPRAZOLE SODIUM 8 MG/HR: 40 INJECTION, POWDER, FOR SOLUTION INTRAVENOUS at 01:48

## 2024-03-24 RX ADMIN — PANTOPRAZOLE SODIUM 8 MG/HR: 40 INJECTION, POWDER, FOR SOLUTION INTRAVENOUS at 14:47

## 2024-03-24 RX ADMIN — ATORVASTATIN CALCIUM 20 MG: 10 TABLET, FILM COATED ORAL at 08:03

## 2024-03-24 RX ADMIN — Medication 1 TABLET: at 08:03

## 2024-03-24 ASSESSMENT — PAIN SCALES - GENERAL: PAINLEVEL_OUTOF10: 4

## 2024-03-24 ASSESSMENT — PAIN DESCRIPTION - LOCATION: LOCATION: THROAT

## 2024-03-24 NOTE — PROGRESS NOTES
Hospital Medicine Progress Note      Date of Admission: 3/22/2024  Hospital Day: 3    Chief Admission Complaint:    Chief Complaint   Patient presents with    Chest Pain     911 called for chest pain, hypotensive and hypoxic at 78% at scene, 4L placed brought patient up to 100%, no home oxygen, lives alone         Subjective:      H/H 6.4/18.8 today.  Patient denies any more melena and states he has not had a bowel movement since he was first admitted.  Any other issues.    Presenting Admission History:       83 y.o. male with a PMH of CAD s/p CABG,HLD, DM, HTN, AVS s/p TAVR, and CVA who presented to [facility] with a complaint of episode of black loose stool this am. He reports in usual state of health until this morning. He denies N/V/F/C or abd pain. Patient lives independently. No hx of gib, he is on aspirin 81mg daily, recently stopped Eliquis.     In ED, patient a/o, pale, hgb 7.0, +fobt, ecg afib, BUN 94/scr 1.6, GI consulted in ED, Trop 91-91, CXR stable. PPI gtt started. 1 L bolus adm.      Assessment/Plan:       Current Principal Problem:  GIB (gastrointestinal bleeding)     GI bleed due to LA grade C esophagitis  - +FOBT, BUN 94 on admission  - GI consulted-s/p EGD on 3/22/2024 with Dr. Ojeda which noted LA grade C esophagitis, hiatal hernia, and old blood in the stomach.  - PPI gtt x 72 hours then decrease to BID  - Continue clear liquid diet per GI recs  - Plan for repeat EGD early next week  - Monitor H/H, transfuse <7  - Iron 255, iron sat 92      HTN-hold lisinopril, amlodipine, coreg for now given GIB, pressures are stable     HLD with history of CAD-resumed home statin.  ASA currently on hold due to GI bleed.  Cardiology consulted, appreciate their recommendations.     Chronic diastolic heart failure. Hx of AS S/P TAVR  - TTE on 11/13/2023 with LV systolic function normal with EF at 60%.  Mild concentric LVH.  No regional wall motion abnormalities.  Diastolic dysfunction noted.  - Repeat TTE

## 2024-03-24 NOTE — PROGRESS NOTES
1 unit of PRBC transfused. No complications. Pt is A&Ox4. VSS. Call light within reach, bed in lowest position, wheels locked.

## 2024-03-24 NOTE — PLAN OF CARE
Problem: Discharge Planning  Goal: Discharge to home or other facility with appropriate resources  Outcome: Progressing     Problem: Chronic Conditions and Co-morbidities  Goal: Patient's chronic conditions and co-morbidity symptoms are monitored and maintained or improved  Outcome: Progressing  Note: Patient with diagnosis of Diabetes. Active orders for ACHS glucose monitoring, SSI and Lantus. Instructed importance of following carb control diet to maintain stable gluocose levels.       Problem: Pain  Goal: Verbalizes/displays adequate comfort level or baseline comfort level  Outcome: Progressing     Problem: Safety - Adult  Goal: Free from fall injury  Outcome: Progressing   CHF Care Plan      Patient's EF (Ejection Fraction) is greater than 40%    Heart Failure Medications:  Diuretics:: None    (One of the following REQUIRED for EF </= 40%/SYSTOLIC FAILURE but MAY be used in EF% >40%/DIASTOLIC FAILURE)        ACE:: None        ARB:: None         ARNI:: None    (Beta Blockers)  NON- Evidenced Based Beta Blocker (for EF% >40%/DIASTOLIC FAILURE): None    Evidenced Based Beta Blocker::(REQUIRED for EF% <40%/SYSTOLIC FAILURE) None  ...................................................................................................................................................    Failed to redirect to the Timeline version of the [employer] SmartLink.      Patient's weights and intake/output reviewed    Daily Weight log at bedside, patient/family participation in use of log: \"yes    Patient's current weight today shows a difference of 7 lbs less than last documented weight.      Intake/Output Summary (Last 24 hours) at 3/24/2024 0245  Last data filed at 3/24/2024 0045  Gross per 24 hour   Intake 1721.25 ml   Output 2070 ml   Net -348.75 ml       Education Booklet Provided: yes    Comorbidities Reviewed Yes    Patient has a past medical history of CAD (coronary artery disease), Diabetes mellitus (HCC), Hypertension, and

## 2024-03-24 NOTE — PROGRESS NOTES
[facility]   Daily Progress Note      Admit Date:  3/22/2024    Reason for follow up visit: Chest pain    CC: \"I feel some better. It's all my esophagus they tell me.\"    83 y.o. man with PMH notable for CAD s/p CABG, AS s/p TAVR, h/o CVA, HTN, HLP, and DM admitted for melena and acute blood loss anemia with presenting Hgb of 8. Noted worsening weakness, fatigue and SOB. Recently stopped Eliquis. Seen by GI who recommend EGD and IV PPI gtt. Noted to have elevated troponin and cardiology consulted.    Interval History:  Pt. seen and examined; records reviewed  BP stable. Remains on room air  Hgb 6.4 this AM; has been transfused  Denies chest pain, SOB, cough palpitations or dizziness  + weakness and fatigue improving  Echo planned for tomorrow    Subjective:  Pt with no acute overnight cardiac events.   A 12 point review of systems was completed. Pertinent positives were identified in the HPI/Interval history. All other review of symptoms negative unless otherwise noted below.    Objective:   BP (!) 131/59   Pulse 79   Temp 97.5 °F (36.4 °C) (Axillary)   Resp 18   Ht 1.854 m (6' 1\")   Wt 63.7 kg (140 lb 8 oz)   SpO2 100%   BMI 18.54 kg/m²     Intake/Output Summary (Last 24 hours) at 3/24/2024 0729  Last data filed at 3/24/2024 0400  Gross per 24 hour   Intake 1601.25 ml   Output 1520 ml   Net 81.25 ml     Wt Readings from Last 3 Encounters:   03/24/24 63.7 kg (140 lb 8 oz)   01/05/24 67.3 kg (148 lb 6.4 oz)   11/13/23 65 kg (143 lb 6.4 oz)       Physical Exam:  General: In no acute distress. Awake, alert, and oriented X4.  Resting in bed  Skin:  Warm and dry.   Neck:  Supple. No JVD appreciated.  Chest: Lungs clear to auscultation. No wheezes/rhonchi/rales  Cardiovascular:  RRR. Normal S1 and S2. No murmur/gallop or rub  Abdomen:  soft, nontender, +bowel sounds.   Extremities:  No LE edema. No clubbing or cyanosis. 2+ bilateral radial/DP pulses. Cap refill brisk    Medications:    atorvastatin  20

## 2024-03-24 NOTE — PROGRESS NOTES
CHF Care Plan      Patient's EF (Ejection Fraction) is greater than 40%    Heart Failure Medications:  Diuretics:: None    (One of the following REQUIRED for EF </= 40%/SYSTOLIC FAILURE but MAY be used in EF% >40%/DIASTOLIC FAILURE)        ACE:: None        ARB:: None         ARNI:: None    (Beta Blockers)  NON- Evidenced Based Beta Blocker (for EF% >40%/DIASTOLIC FAILURE): None    Evidenced Based Beta Blocker::(REQUIRED for EF% <40%/SYSTOLIC FAILURE) None  ...................................................................................................................................................    Failed to redirect to the Timeline version of the [employer] SmartLink.      Patient's weights and intake/output reviewed    Daily Weight log at bedside, patient/family participation in use of log: \"yes    Patient's current weight today shows a difference of 185.4 lbs less than last documented weight.      Intake/Output Summary (Last 24 hours) at 3/24/2024 0825  Last data filed at 3/24/2024 0821  Gross per 24 hour   Intake 1841.25 ml   Output 1670 ml   Net 171.25 ml       Education Booklet Provided: yes    Comorbidities Reviewed Yes    Patient has a past medical history of CAD (coronary artery disease), Diabetes mellitus (HCC), Hypertension, and Unspecified cerebral artery occlusion with cerebral infarction.     >>For CHF and Comorbidity documentation on Education Time and Topics, please see Education Tab      Pt resting in bed at this time on room air. Pt denies shortness of breath. Pt without lower extremity edema.     Patient and/or Family's stated Goal of Care this Admission: reduce shortness of breath, increase activity tolerance, better understand heart failure and disease management, be more comfortable, and reduce lower extremity edema prior to discharge        :

## 2024-03-25 ENCOUNTER — ANESTHESIA EVENT (OUTPATIENT)
Dept: ENDOSCOPY | Age: 84
DRG: 377 | End: 2024-03-25
Payer: MEDICARE

## 2024-03-25 ENCOUNTER — ANESTHESIA (OUTPATIENT)
Dept: ENDOSCOPY | Age: 84
DRG: 377 | End: 2024-03-25
Payer: MEDICARE

## 2024-03-25 LAB
ANION GAP SERPL CALCULATED.3IONS-SCNC: 10 MMOL/L (ref 3–16)
BASOPHILS # BLD: 0 K/UL (ref 0–0.2)
BASOPHILS NFR BLD: 0.5 %
BUN SERPL-MCNC: 66 MG/DL (ref 7–20)
BURR CELLS BLD QL SMEAR: ABNORMAL
CALCIUM SERPL-MCNC: 8.3 MG/DL (ref 8.3–10.6)
CHLORIDE SERPL-SCNC: 109 MMOL/L (ref 99–110)
CO2 SERPL-SCNC: 21 MMOL/L (ref 21–32)
CREAT SERPL-MCNC: 1.6 MG/DL (ref 0.8–1.3)
DEPRECATED RDW RBC AUTO: 14.9 % (ref 12.4–15.4)
EOSINOPHIL # BLD: 0.2 K/UL (ref 0–0.6)
EOSINOPHIL NFR BLD: 3 %
GFR SERPLBLD CREATININE-BSD FMLA CKD-EPI: 42 ML/MIN/{1.73_M2}
GLUCOSE BLD-MCNC: 138 MG/DL (ref 70–99)
GLUCOSE BLD-MCNC: 142 MG/DL (ref 70–99)
GLUCOSE BLD-MCNC: 152 MG/DL (ref 70–99)
GLUCOSE BLD-MCNC: 254 MG/DL (ref 70–99)
GLUCOSE SERPL-MCNC: 131 MG/DL (ref 70–99)
HCT VFR BLD AUTO: 20.9 % (ref 40.5–52.5)
HCT VFR BLD AUTO: 22.2 % (ref 40.5–52.5)
HCT VFR BLD AUTO: 22.4 % (ref 40.5–52.5)
HCT VFR BLD AUTO: 22.6 % (ref 40.5–52.5)
HGB BLD-MCNC: 7.1 G/DL (ref 13.5–17.5)
HGB BLD-MCNC: 7.5 G/DL (ref 13.5–17.5)
HGB BLD-MCNC: 7.5 G/DL (ref 13.5–17.5)
HGB BLD-MCNC: 7.7 G/DL (ref 13.5–17.5)
LYMPHOCYTES # BLD: 1.4 K/UL (ref 1–5.1)
LYMPHOCYTES NFR BLD: 20.3 %
MCH RBC QN AUTO: 31.5 PG (ref 26–34)
MCHC RBC AUTO-ENTMCNC: 33.9 G/DL (ref 31–36)
MCV RBC AUTO: 93 FL (ref 80–100)
MONOCYTES # BLD: 0.8 K/UL (ref 0–1.3)
MONOCYTES NFR BLD: 12 %
NEUTROPHILS # BLD: 4.3 K/UL (ref 1.7–7.7)
NEUTROPHILS NFR BLD: 64.2 %
OVALOCYTES BLD QL SMEAR: ABNORMAL
PERFORMED ON: ABNORMAL
PLATELET # BLD AUTO: 82 K/UL (ref 135–450)
PLATELET BLD QL SMEAR: ABNORMAL
PMV BLD AUTO: 8.5 FL (ref 5–10.5)
POTASSIUM SERPL-SCNC: 3.6 MMOL/L (ref 3.5–5.1)
RBC # BLD AUTO: 2.24 M/UL (ref 4.2–5.9)
SLIDE REVIEW: ABNORMAL
SODIUM SERPL-SCNC: 140 MMOL/L (ref 136–145)
WBC # BLD AUTO: 6.7 K/UL (ref 4–11)

## 2024-03-25 PROCEDURE — 2580000003 HC RX 258: Performed by: ANESTHESIOLOGY

## 2024-03-25 PROCEDURE — 3609012400 HC EGD TRANSORAL BIOPSY SINGLE/MULTIPLE: Performed by: INTERNAL MEDICINE

## 2024-03-25 PROCEDURE — 2709999900 HC NON-CHARGEABLE SUPPLY: Performed by: INTERNAL MEDICINE

## 2024-03-25 PROCEDURE — 2580000003 HC RX 258: Performed by: NURSE PRACTITIONER

## 2024-03-25 PROCEDURE — 0W3P8ZZ CONTROL BLEEDING IN GASTROINTESTINAL TRACT, VIA NATURAL OR ARTIFICIAL OPENING ENDOSCOPIC: ICD-10-PCS | Performed by: INTERNAL MEDICINE

## 2024-03-25 PROCEDURE — 93306 TTE W/DOPPLER COMPLETE: CPT

## 2024-03-25 PROCEDURE — 2500000003 HC RX 250 WO HCPCS: Performed by: NURSE ANESTHETIST, CERTIFIED REGISTERED

## 2024-03-25 PROCEDURE — 6370000000 HC RX 637 (ALT 250 FOR IP): Performed by: NURSE PRACTITIONER

## 2024-03-25 PROCEDURE — 85014 HEMATOCRIT: CPT

## 2024-03-25 PROCEDURE — 7100000011 HC PHASE II RECOVERY - ADDTL 15 MIN: Performed by: INTERNAL MEDICINE

## 2024-03-25 PROCEDURE — 7100000010 HC PHASE II RECOVERY - FIRST 15 MIN: Performed by: INTERNAL MEDICINE

## 2024-03-25 PROCEDURE — C9113 INJ PANTOPRAZOLE SODIUM, VIA: HCPCS | Performed by: NURSE PRACTITIONER

## 2024-03-25 PROCEDURE — 3700000001 HC ADD 15 MINUTES (ANESTHESIA): Performed by: INTERNAL MEDICINE

## 2024-03-25 PROCEDURE — 88305 TISSUE EXAM BY PATHOLOGIST: CPT

## 2024-03-25 PROCEDURE — C1889 IMPLANT/INSERT DEVICE, NOC: HCPCS | Performed by: INTERNAL MEDICINE

## 2024-03-25 PROCEDURE — 6360000002 HC RX W HCPCS: Performed by: NURSE PRACTITIONER

## 2024-03-25 PROCEDURE — 6360000002 HC RX W HCPCS: Performed by: NURSE ANESTHETIST, CERTIFIED REGISTERED

## 2024-03-25 PROCEDURE — 3609013000 HC EGD TRANSORAL CONTROL BLEEDING ANY METHOD: Performed by: INTERNAL MEDICINE

## 2024-03-25 PROCEDURE — 36415 COLL VENOUS BLD VENIPUNCTURE: CPT

## 2024-03-25 PROCEDURE — 2720000010 HC SURG SUPPLY STERILE: Performed by: INTERNAL MEDICINE

## 2024-03-25 PROCEDURE — 99233 SBSQ HOSP IP/OBS HIGH 50: CPT | Performed by: INTERNAL MEDICINE

## 2024-03-25 PROCEDURE — 85025 COMPLETE CBC W/AUTO DIFF WBC: CPT

## 2024-03-25 PROCEDURE — 2060000000 HC ICU INTERMEDIATE R&B

## 2024-03-25 PROCEDURE — 3700000000 HC ANESTHESIA ATTENDED CARE: Performed by: INTERNAL MEDICINE

## 2024-03-25 PROCEDURE — 80048 BASIC METABOLIC PNL TOTAL CA: CPT

## 2024-03-25 PROCEDURE — 85018 HEMOGLOBIN: CPT

## 2024-03-25 DEVICE — WORKING LENGTH 235CM, WORKING CHANNEL 2.8MM
Type: IMPLANTABLE DEVICE | Site: STOMACH | Status: FUNCTIONAL
Brand: RESOLUTION 360 CLIP

## 2024-03-25 RX ORDER — PROPOFOL 10 MG/ML
INJECTION, EMULSION INTRAVENOUS PRN
Status: DISCONTINUED | OUTPATIENT
Start: 2024-03-25 | End: 2024-03-25 | Stop reason: SDUPTHER

## 2024-03-25 RX ORDER — LIDOCAINE HYDROCHLORIDE 20 MG/ML
INJECTION, SOLUTION INFILTRATION; PERINEURAL PRN
Status: DISCONTINUED | OUTPATIENT
Start: 2024-03-25 | End: 2024-03-25 | Stop reason: SDUPTHER

## 2024-03-25 RX ORDER — SODIUM CHLORIDE 0.9 % (FLUSH) 0.9 %
5-40 SYRINGE (ML) INJECTION PRN
Status: DISCONTINUED | OUTPATIENT
Start: 2024-03-25 | End: 2024-03-25 | Stop reason: HOSPADM

## 2024-03-25 RX ORDER — SODIUM CHLORIDE 0.9 % (FLUSH) 0.9 %
5-40 SYRINGE (ML) INJECTION EVERY 12 HOURS SCHEDULED
Status: DISCONTINUED | OUTPATIENT
Start: 2024-03-25 | End: 2024-03-25 | Stop reason: HOSPADM

## 2024-03-25 RX ORDER — LIDOCAINE HYDROCHLORIDE 10 MG/ML
1 INJECTION, SOLUTION EPIDURAL; INFILTRATION; INTRACAUDAL; PERINEURAL
Status: DISCONTINUED | OUTPATIENT
Start: 2024-03-25 | End: 2024-03-25 | Stop reason: HOSPADM

## 2024-03-25 RX ORDER — SODIUM CHLORIDE 9 MG/ML
INJECTION, SOLUTION INTRAVENOUS PRN
Status: DISCONTINUED | OUTPATIENT
Start: 2024-03-25 | End: 2024-03-25 | Stop reason: HOSPADM

## 2024-03-25 RX ORDER — SODIUM CHLORIDE, SODIUM LACTATE, POTASSIUM CHLORIDE, CALCIUM CHLORIDE 600; 310; 30; 20 MG/100ML; MG/100ML; MG/100ML; MG/100ML
INJECTION, SOLUTION INTRAVENOUS CONTINUOUS
Status: DISCONTINUED | OUTPATIENT
Start: 2024-03-25 | End: 2024-03-25 | Stop reason: HOSPADM

## 2024-03-25 RX ADMIN — SODIUM CHLORIDE, PRESERVATIVE FREE 10 ML: 5 INJECTION INTRAVENOUS at 21:01

## 2024-03-25 RX ADMIN — PANTOPRAZOLE SODIUM 8 MG/HR: 40 INJECTION, POWDER, FOR SOLUTION INTRAVENOUS at 00:24

## 2024-03-25 RX ADMIN — SODIUM CHLORIDE, POTASSIUM CHLORIDE, SODIUM LACTATE AND CALCIUM CHLORIDE: 600; 310; 30; 20 INJECTION, SOLUTION INTRAVENOUS at 13:20

## 2024-03-25 RX ADMIN — PROPOFOL 100 MG: 10 INJECTION, EMULSION INTRAVENOUS at 13:53

## 2024-03-25 RX ADMIN — LIDOCAINE HYDROCHLORIDE 60 MG: 20 INJECTION, SOLUTION INFILTRATION; PERINEURAL at 13:53

## 2024-03-25 RX ADMIN — SODIUM CHLORIDE, PRESERVATIVE FREE 10 ML: 5 INJECTION INTRAVENOUS at 08:17

## 2024-03-25 RX ADMIN — ATORVASTATIN CALCIUM 20 MG: 10 TABLET, FILM COATED ORAL at 08:17

## 2024-03-25 RX ADMIN — Medication 1 TABLET: at 08:17

## 2024-03-25 RX ADMIN — Medication 1 LOZENGE: at 21:01

## 2024-03-25 ASSESSMENT — PAIN SCALES - GENERAL
PAINLEVEL_OUTOF10: 0

## 2024-03-25 ASSESSMENT — PAIN - FUNCTIONAL ASSESSMENT: PAIN_FUNCTIONAL_ASSESSMENT: NONE - DENIES PAIN

## 2024-03-25 NOTE — PROGRESS NOTES
D:    Latest Reference Range & Units 03/25/24 12:16   Hemoglobin Quant 13.5 - 17.5 g/dL 7.5 (L)   Hematocrit 40.5 - 52.5 % 22.4 (L)   (L): Data is abnormally low  A: no action taken.   R: VSS

## 2024-03-25 NOTE — H&P
[facility] ENDO  Procedure H&P    Patient: Ron Chirinos MRN: [id number]     YOB: 1940  Age: 83 y.o.  Sex: male    Unit: [facility] ENDO Room/Bed: [facility] Endo Pool/NONE Location: [facility]     Procedure: Procedure(s):  ESOPHAGOGASTRODUODENOSCOPY DIAGNOSTIC ONLY    Indication: Melena weakness anemia.  Referring  Physician:        Brief history: Patient with upper endoscopy 2 days ago with residual blood in the stomach limiting visualization.    Nurses past medical history notes reviewed and agreed.   Medications reviewed.    Allergies: Isosorbide nitrate and Norvasc [amlodipine]     Allergies noted: Yes     Past Medical History:   Past Medical History:   Diagnosis Date    CAD (coronary artery disease)     Diabetes mellitus (HCC)     Hypertension     Unspecified cerebral artery occlusion with cerebral infarction        Past Surgical History:   Past Surgical History:   Procedure Laterality Date    CARDIAC SURGERY  2002    5 vessel bypass    CARDIAC SURGERY N/A 8/29/2023    TRANSCATHETER AORTIC VALVE REPLACEMENT FEMORAL APPROACH performed by Naeem Feliciano MD at [facility]    CARDIAC SURGERY N/A 8/29/2023    TRANSCATHETER AORTIC VALVE REPLACEMENT FEMORAL APPROACH performed by Joshua Estrella MD at [facility]    CORONARY ANGIOPLASTY WITH STENT PLACEMENT      EYE SURGERY Left 11/17/2016    cataract removal with lens implant    HERNIA REPAIR      UPPER GASTROINTESTINAL ENDOSCOPY N/A 3/22/2024    ESOPHAGOGASTRODUODENOSCOPY DIAGNOSTIC ONLY performed by Deborah Ojeda MD at [facility] ENDOSCOPY       Social History:   Social History     Socioeconomic History    Marital status:      Spouse name: Not on file    Number of children: Not on file    Years of education: Not on file    Highest education level: Not on file   Occupational History    Not on file   Tobacco Use    Smoking status: Never    Smokeless tobacco: Never   Substance and Sexual Activity    Alcohol use: Yes      MD Alex       Review of Systems:  Weight Loss: No  Dysphagia: No  Dyspepsia: No    Physical Exam:   Vital Signs: /62   Pulse 72   Temp 98.4 °F (36.9 °C) (Oral)   Resp 16   Ht 1.854 m (6' 1\")   Wt 64.4 kg (141 lb 14.4 oz)   SpO2 99%   BMI 18.72 kg/m²   Vital signs reviewed:Yes    HEENT:Normal  Cardiac:Normal  Chest:Normal  Abdomen:Normal  Exts: Normal  Neuro:Normal    Labs:  No results displayed because visit has over 200 results.           Imaging:  XR CHEST PORTABLE   Final Result   No radiographic evidence of acute cardiopulmonary pathology.             ASA:4    Mallampati Score: I    Sedation planned:MAC    Patient in acceptable condition for procedure:Yes    1:45 PM 3/25/2024    Naeem Cash, DO      Please note that some or all of this record was generated using voice recognition software. If there are any questions about the content of this document, please contact the author as some errors in transcription may have occurred.    The patient and I discussed that this is an elective procedure/surgery. We discussed the risks of the procedure/surgery, including but not limited to what is outlined in the signed informed consent. We also discussed the risk of alexander COVID 19 while in the facility. We discussed the increased risk of a bad outcome should the patient contract COVID 19 during the post-procedural/post-operative period, given the patient’s current health condition, chronic conditions, and the added risk of COVID 19 in light of these conditions. The patient and I also discussed the risk of further postponing the procedure/surgery and other treatment alternatives, including non-procedural/surgical treatments. Understanding all of the risks, benefits, and alternatives, the patient made an informed decision to proceed with the procedure/surgery.

## 2024-03-25 NOTE — DISCHARGE INSTRUCTIONS
ENDOSCOPIC CLIP INSTRUCTIONS      You have had an Endoscopy today and had a metal clip placed in your gastrointestinal tract.  If you have to have a MRI please give the laminated card to the MRI technician prior to your procedure.  The card has reccommended settings for MRI after an Endoclip placement.    Thank you.

## 2024-03-25 NOTE — ANESTHESIA PRE PROCEDURE
Date of last solid food consumption: 03/24/24    BMI:   Wt Readings from Last 3 Encounters:   03/25/24 64.4 kg (141 lb 14.4 oz)   01/05/24 67.3 kg (148 lb 6.4 oz)   11/13/23 65 kg (143 lb 6.4 oz)     Body mass index is 18.72 kg/m².    CBC:   Lab Results   Component Value Date/Time    WBC 6.7 03/25/2024 05:25 AM    RBC 2.24 03/25/2024 05:25 AM    HGB 7.5 03/25/2024 12:16 PM    HCT 22.4 03/25/2024 12:16 PM    MCV 93.0 03/25/2024 05:25 AM    RDW 14.9 03/25/2024 05:25 AM    PLT 82 03/25/2024 05:25 AM       CMP:   Lab Results   Component Value Date/Time     03/25/2024 05:25 AM    K 3.6 03/25/2024 05:25 AM     03/25/2024 05:25 AM    CO2 21 03/25/2024 05:25 AM    BUN 66 03/25/2024 05:25 AM    CREATININE 1.6 03/25/2024 05:25 AM    GFRAA >60 03/10/2016 11:08 AM    AGRATIO 1.9 03/22/2024 10:36 AM    LABGLOM 42 03/25/2024 05:25 AM    GLUCOSE 131 03/25/2024 05:25 AM    PROT 5.2 03/22/2024 10:36 AM    CALCIUM 8.3 03/25/2024 05:25 AM    BILITOT 0.3 03/22/2024 10:36 AM    ALKPHOS 57 03/22/2024 10:36 AM    AST 14 03/22/2024 10:36 AM    ALT 13 03/22/2024 10:36 AM       POC Tests:   Recent Labs     03/25/24  1146   POCGLU 138*         Coags:   Lab Results   Component Value Date/Time    PROTIME 16.3 03/23/2024 04:23 AM    INR 1.32 03/23/2024 04:23 AM    APTT 30.5 03/23/2024 04:23 AM       HCG (If Applicable): No results found for: \"PREGTESTUR\", \"PREGSERUM\", \"HCG\", \"HCGQUANT\"     ABGs:   Lab Results   Component Value Date/Time    PHART 7.403 08/29/2023 11:36 AM    PO2ART 464.5 08/29/2023 11:36 AM    QVJ1LQQ 42.5 08/29/2023 11:36 AM    PKN0PLF 26.5 08/29/2023 11:36 AM    BEART 2 08/29/2023 11:36 AM    N6PANCTL 100 08/29/2023 11:36 AM        Type & Screen (If Applicable):  No results found for: \"LABABO\", \"LABRH\"    Drug/Infectious Status (If Applicable):  No results found for: \"HIV\", \"HEPCAB\"    COVID-19 Screening (If Applicable):   Lab Results   Component Value Date/Time    COVID19 NOT DETECTED 03/22/2024 11:04 AM

## 2024-03-25 NOTE — PROCEDURES
EGD with clip placement PROCEDURE NOTE         Esophagogastroduodenoscopy Procedure Note     Patient: Ron Chirinos MRN: [id number]   YOB: 1940 Age: 83 y.o. Sex: male   Unit: [facility] ENDO Room/Bed: ASC Endo Pool/NONE Location: [facility]    Admitting Physician: CARMEN MARTINEZ     Primary Care Physician: Marlo Pichardo MD      DATE OF PROCEDURE: 3/25/2024  PROCEDURE: Esophagogastroduodenoscopy  INDICATION: This is a 83 y.o. year old male who presents today melena  ENDOSCOPIST: Naeem Cash DO    POSTOPERATIVE DIAGNOSIS: LA grade C esophagitis  Hiatal hernia  Probable Dieulafoy's  ulcer in the fundus with visible vessel    PLAN: Monitor hemoglobin transfuse as needed  Protonix 40 mg daily    INFORMED CONSENT:  Informed consent for esophagogastoduodenoscopy was obtained.  The benefits and risks including adverse medicine reaction have been explained.  The patient's questions were answered and the patient agreed to proceed.    ASA:  ASA 3 - Patient with moderate systemic disease with functional limitations    SEDATION: MAC     The patient's vital signs, cardiac status, pulmonary status, abdominal status and mental status were stable for the procedure.  The patient's vitals signs and respiratory function as monitored by oxygen saturation were stable throughout    Procedure Details:    The Olympus videoendoscope was inserted into the mouth and carefully passed into the esophagus, through the stomach and to the distal duodenum.  Antegrade and retrograde examination of the upper gi tract was carefully performed.    Findings:   The esophagus is at ulcerations in the distal esophagus.  The scope easily passed into the stomach.  There is a large hiatal hernia with half the body and the fundus and the chest.  There was noted to be a pigmented protuberance on top of a fold in the fundus.  Had a difficult time trying to get a position up in this area but I did place 2 clips.   The mucosa of the cardia, , body and antrum of the stomach is normal.  The pylorus is patent and of normal contour.  The scope easily advanced into the duodenal bulb and down to the distal duodenum.  Ante-and retrograde exam of the duodenum is normal.    Gastric or Duodenal ulcer present: Yes    Estimated Blood Loss: Minimal      Signed By: Naeem Cash DO

## 2024-03-25 NOTE — PROGRESS NOTES
Pt to endo, report given to endo RN, consent was not signed update on last time pt had anything to eat or drink.

## 2024-03-25 NOTE — PROGRESS NOTES
Hospital Medicine Progress Note      Date of Admission: 3/22/2024  Hospital Day: 4    Chief Admission Complaint:    Chief Complaint   Patient presents with    Chest Pain     911 called for chest pain, hypotensive and hypoxic at 78% at scene, 4L placed brought patient up to 100%, no home oxygen, lives alone         Subjective:      EGD planned for today per GI.  Patient denies any issues.  He does state that he had another black bowel movement yesterday evening.    TTE pending.    Presenting Admission History:       83 y.o. male with a PMH of CAD s/p CABG,HLD, DM, HTN, AVS s/p TAVR, and CVA who presented to [facility] with a complaint of episode of black loose stool this am. He reports in usual state of health until this morning. He denies N/V/F/C or abd pain. Patient lives independently. No hx of gib, he is on aspirin 81mg daily, recently stopped Eliquis.     In ED, patient a/o, pale, hgb 7.0, +fobt, ecg afib, BUN 94/scr 1.6, GI consulted in ED, Trop 91-91, CXR stable. PPI gtt started. 1 L bolus adm.      Assessment/Plan:       Current Principal Problem:  GIB (gastrointestinal bleeding)     GI bleed due to LA grade C esophagitis  - +FOBT, BUN 94 on admission  - GI consulted-s/p EGD on 3/22/2024 with Dr. Ojeda which noted LA grade C esophagitis, hiatal hernia, and old blood in the stomach.  - PPI gtt x 72 hours then decrease to BID  -N.p.o. for procedure  - Plan for repeat EGD today  - Monitor H/H, transfuse <7  - Iron 255, iron sat 92      HTN-hold lisinopril, amlodipine, coreg for now given GIB, pressures are stable     HLD with history of CAD-resumed home statin.  ASA currently on hold due to GI bleed.  Cardiology consulted, appreciate their recommendations.     Chronic diastolic heart failure. Hx of AS S/P TAVR  - TTE on 11/13/2023 with LV systolic function normal with EF at 60%.  Mild concentric LVH.  No regional wall motion abnormalities.  Diastolic dysfunction noted.  - Repeat TTE pending per cardiology  \"ORG\"      Concepcion Prettydorota Garcia, APRN

## 2024-03-25 NOTE — PLAN OF CARE
Problem: Discharge Planning  Goal: Discharge to home or other facility with appropriate resources  Outcome: Progressing     Problem: Pain  Goal: Verbalizes/displays adequate comfort level or baseline comfort level  Outcome: Progressing     Problem: Chronic Conditions and Co-morbidities  Goal: Patient's chronic conditions and co-morbidity symptoms are monitored and maintained or improved  Outcome: Progressing  Note: Patient with diagnosis of Diabetes. Active orders for ACHS glucose monitoring, SSI and Lantus. Instructed importance of following carb control diet to maintain stable gluocose levels.       Problem: Safety - Adult  Goal: Free from fall injury  Outcome: Progressing     CHF Care Plan      Patient's EF (Ejection Fraction) is greater than 40%    Heart Failure Medications:  Diuretics:: None    (One of the following REQUIRED for EF </= 40%/SYSTOLIC FAILURE but MAY be used in EF% >40%/DIASTOLIC FAILURE)        ACE:: None        ARB:: None         ARNI:: None    (Beta Blockers)  NON- Evidenced Based Beta Blocker (for EF% >40%/DIASTOLIC FAILURE): None    Evidenced Based Beta Blocker::(REQUIRED for EF% <40%/SYSTOLIC FAILURE) None  ...................................................................................................................................................    Failed to redirect to the Timeline version of the [email or web address] SmartLink.      Patient's weights and intake/output reviewed    Daily Weight log at bedside, patient/family participation in use of log: \"yes    Patient's current weight today shows a difference of 1 lbs less than last documented weight.      Intake/Output Summary (Last 24 hours) at 3/25/2024 0014  Last data filed at 3/24/2024 2330  Gross per 24 hour   Intake 1423.92 ml   Output 1875 ml   Net -451.08 ml       Education Booklet Provided: yes    Comorbidities Reviewed Yes    Patient has a past medical history of CAD (coronary artery disease), Diabetes mellitus (HCC), Hypertension, and

## 2024-03-25 NOTE — PLAN OF CARE
Problem: Chronic Conditions and Co-morbidities  Goal: Patient's chronic conditions and co-morbidity symptoms are monitored and maintained or improved  3/25/2024 0853 by Pretty Garcia RN  Outcome: Progressing

## 2024-03-25 NOTE — PROGRESS NOTES
Pts chart reviewed by this Rn, in preparation for scheduled endoscopy procedure     Report obtained by flor RN SUMMER

## 2024-03-25 NOTE — PROGRESS NOTES
[facility]   PROGRESS NOTE  [phone]      Attending Physician: Robyn Stoner MD  Reason for Consultation/Chief Complaint: Elevated troponin    Subjective     Denies chest pain and shortness of breath.    Scheduled for EGD today.      CURRENT Medications:  0.9 % sodium chloride infusion, PRN  benzocaine-menthol (CEPACOL SORE THROAT) lozenge 1 lozenge, Q2H PRN  atorvastatin (LIPITOR) tablet 20 mg, Daily  therapeutic multivitamin-minerals 1 tablet, Daily  0.9 % sodium chloride infusion, PRN  sodium chloride flush 0.9 % injection 5-40 mL, 2 times per day  sodium chloride flush 0.9 % injection 5-40 mL, PRN  0.9 % sodium chloride infusion, PRN  ondansetron (ZOFRAN-ODT) disintegrating tablet 4 mg, Q8H PRN   Or  ondansetron (ZOFRAN) injection 4 mg, Q6H PRN  acetaminophen (TYLENOL) tablet 650 mg, Q6H PRN   Or  acetaminophen (TYLENOL) suppository 650 mg, Q6H PRN  glucose chewable tablet 16 g, PRN  dextrose bolus 10% 125 mL, PRN   Or  dextrose bolus 10% 250 mL, PRN  glucagon injection 1 mg, PRN  dextrose 10 % infusion, Continuous PRN  insulin lispro (HUMALOG) injection vial 0-4 Units, Q4H  0.9 % sodium chloride infusion, PRN        Allergies:  Isosorbide nitrate and Norvasc [amlodipine]     Review of Systems:   Negative except as noted above.      Objective   PHYSICAL EXAM:    Vitals:    03/25/24 1430   BP: (!) 143/73   Pulse: 77   Resp: 18   Temp: 97.7 °F (36.5 °C)   SpO2: 100%    Weight - Scale: 64.4 kg (141 lb 14.4 oz)      General: Adult male in no acute distress. Pleasant and interactive on exam.  HEENT: Normocephalic, atraumatic, non-icteric, hearing intact, nares normal, mucous membranes moist.  Neck: No JVD.  Heart: Irregular rhythm. Normal S1 and S2.  Grade II/VI systolic murmur. No rubs or gallops.  Lungs: Normal respiratory effort. Clear to auscultation bilaterally. No wheezes, rales, or rhonchi.  Abdomen: Soft, non-tender. Normoactive bowel sounds. No masses or organomegaly.  Skin: No  rashes, wounds, or lesions.  Extremities: No clubbing, cyanosis, or edema.  Psych: Normal mood and affect.  Neuro: Alert and oriented to person, place, and time. No focal deficits noted.      Labs   CBC:   Lab Results   Component Value Date/Time    WBC 6.7 03/25/2024 05:25 AM    RBC 2.24 03/25/2024 05:25 AM    HGB 7.5 03/25/2024 12:16 PM    HCT 22.4 03/25/2024 12:16 PM    MCV 93.0 03/25/2024 05:25 AM    RDW 14.9 03/25/2024 05:25 AM    PLT 82 03/25/2024 05:25 AM     CMP:  Lab Results   Component Value Date/Time     03/25/2024 05:25 AM    K 3.6 03/25/2024 05:25 AM     03/25/2024 05:25 AM    CO2 21 03/25/2024 05:25 AM    BUN 66 03/25/2024 05:25 AM    CREATININE 1.6 03/25/2024 05:25 AM    GFRAA >60 03/10/2016 11:08 AM    AGRATIO 1.9 03/22/2024 10:36 AM    LABGLOM 42 03/25/2024 05:25 AM    GLUCOSE 131 03/25/2024 05:25 AM    PROT 5.2 03/22/2024 10:36 AM    CALCIUM 8.3 03/25/2024 05:25 AM    BILITOT 0.3 03/22/2024 10:36 AM    ALKPHOS 57 03/22/2024 10:36 AM    AST 14 03/22/2024 10:36 AM    ALT 13 03/22/2024 10:36 AM     PT/INR:  No results found for: \"PTINR\"  HgBA1c:  Lab Results   Component Value Date    LABA1C 5.7 03/23/2024     Lab Results   Component Value Date    TROPONINI 0.14 (H) 04/08/2023         Cardiac Data     TTE 3/23/24:  Conclusions   Summary   Left ventricular systolic function is normal with a visually estimated   ejection fraction of >55%.   The left ventricle is normal in size with mild concentric hypertrophy.   Indeterminate diastolic function.   The right ventricle is normal in size with reduced systolic function.   The right atrium is mildly enlarged.   Mitral annular calcification is present.   Moderate thickening of posterior and the anterior leaflet of mitral valve.   Mild to moderate mitral stenosis.   Systolic pulmonary artery pressure (SPAP) is normal and estimated at 28 mmHg   (right atrial pressure 3 mmHg).      Assessment:      1. Troponin elevation - Likely elevated in setting of

## 2024-03-26 LAB
ANION GAP SERPL CALCULATED.3IONS-SCNC: 12 MMOL/L (ref 3–16)
BACTERIA BLD CULT ORG #2: NORMAL
BACTERIA BLD CULT: NORMAL
BASOPHILS # BLD: 0 K/UL (ref 0–0.2)
BASOPHILS NFR BLD: 0.6 %
BUN SERPL-MCNC: 39 MG/DL (ref 7–20)
CALCIUM SERPL-MCNC: 8.7 MG/DL (ref 8.3–10.6)
CHLORIDE SERPL-SCNC: 103 MMOL/L (ref 99–110)
CO2 SERPL-SCNC: 22 MMOL/L (ref 21–32)
CREAT SERPL-MCNC: 1.4 MG/DL (ref 0.8–1.3)
DEPRECATED RDW RBC AUTO: 14.8 % (ref 12.4–15.4)
EOSINOPHIL # BLD: 0.3 K/UL (ref 0–0.6)
EOSINOPHIL NFR BLD: 3.6 %
GFR SERPLBLD CREATININE-BSD FMLA CKD-EPI: 49 ML/MIN/{1.73_M2}
GLUCOSE BLD-MCNC: 132 MG/DL (ref 70–99)
GLUCOSE BLD-MCNC: 135 MG/DL (ref 70–99)
GLUCOSE BLD-MCNC: 139 MG/DL (ref 70–99)
GLUCOSE BLD-MCNC: 170 MG/DL (ref 70–99)
GLUCOSE SERPL-MCNC: 112 MG/DL (ref 70–99)
HCT VFR BLD AUTO: 22 % (ref 40.5–52.5)
HCT VFR BLD AUTO: 22.9 % (ref 40.5–52.5)
HGB BLD-MCNC: 7.5 G/DL (ref 13.5–17.5)
HGB BLD-MCNC: 7.8 G/DL (ref 13.5–17.5)
LYMPHOCYTES # BLD: 1.6 K/UL (ref 1–5.1)
LYMPHOCYTES NFR BLD: 21.9 %
MAGNESIUM SERPL-MCNC: 2.1 MG/DL (ref 1.8–2.4)
MCH RBC QN AUTO: 31.6 PG (ref 26–34)
MCHC RBC AUTO-ENTMCNC: 34.2 G/DL (ref 31–36)
MCV RBC AUTO: 92.4 FL (ref 80–100)
MONOCYTES # BLD: 0.9 K/UL (ref 0–1.3)
MONOCYTES NFR BLD: 11.8 %
NEUTROPHILS # BLD: 4.5 K/UL (ref 1.7–7.7)
NEUTROPHILS NFR BLD: 62.1 %
PERFORMED ON: ABNORMAL
PLATELET # BLD AUTO: 114 K/UL (ref 135–450)
PMV BLD AUTO: 8.9 FL (ref 5–10.5)
POTASSIUM SERPL-SCNC: 3.4 MMOL/L (ref 3.5–5.1)
RBC # BLD AUTO: 2.48 M/UL (ref 4.2–5.9)
SODIUM SERPL-SCNC: 137 MMOL/L (ref 136–145)
WBC # BLD AUTO: 7.2 K/UL (ref 4–11)

## 2024-03-26 PROCEDURE — 83735 ASSAY OF MAGNESIUM: CPT

## 2024-03-26 PROCEDURE — 85018 HEMOGLOBIN: CPT

## 2024-03-26 PROCEDURE — 99232 SBSQ HOSP IP/OBS MODERATE 35: CPT | Performed by: NURSE PRACTITIONER

## 2024-03-26 PROCEDURE — 2580000003 HC RX 258: Performed by: NURSE PRACTITIONER

## 2024-03-26 PROCEDURE — 97166 OT EVAL MOD COMPLEX 45 MIN: CPT

## 2024-03-26 PROCEDURE — C9113 INJ PANTOPRAZOLE SODIUM, VIA: HCPCS | Performed by: NURSE PRACTITIONER

## 2024-03-26 PROCEDURE — 97535 SELF CARE MNGMENT TRAINING: CPT

## 2024-03-26 PROCEDURE — 6370000000 HC RX 637 (ALT 250 FOR IP): Performed by: NURSE PRACTITIONER

## 2024-03-26 PROCEDURE — 36415 COLL VENOUS BLD VENIPUNCTURE: CPT

## 2024-03-26 PROCEDURE — 97530 THERAPEUTIC ACTIVITIES: CPT

## 2024-03-26 PROCEDURE — 2060000000 HC ICU INTERMEDIATE R&B

## 2024-03-26 PROCEDURE — 6360000002 HC RX W HCPCS: Performed by: NURSE PRACTITIONER

## 2024-03-26 PROCEDURE — 80048 BASIC METABOLIC PNL TOTAL CA: CPT

## 2024-03-26 PROCEDURE — 97162 PT EVAL MOD COMPLEX 30 MIN: CPT

## 2024-03-26 PROCEDURE — 97116 GAIT TRAINING THERAPY: CPT

## 2024-03-26 PROCEDURE — 85014 HEMATOCRIT: CPT

## 2024-03-26 PROCEDURE — 85025 COMPLETE CBC W/AUTO DIFF WBC: CPT

## 2024-03-26 RX ORDER — PANTOPRAZOLE SODIUM 40 MG/10ML
40 INJECTION, POWDER, LYOPHILIZED, FOR SOLUTION INTRAVENOUS DAILY
Status: DISCONTINUED | OUTPATIENT
Start: 2024-03-26 | End: 2024-03-27 | Stop reason: HOSPADM

## 2024-03-26 RX ORDER — POTASSIUM CHLORIDE 20 MEQ/1
20 TABLET, EXTENDED RELEASE ORAL ONCE
Status: COMPLETED | OUTPATIENT
Start: 2024-03-26 | End: 2024-03-26

## 2024-03-26 RX ADMIN — POTASSIUM CHLORIDE 20 MEQ: 1500 TABLET, EXTENDED RELEASE ORAL at 09:19

## 2024-03-26 RX ADMIN — PANTOPRAZOLE SODIUM 40 MG: 40 INJECTION, POWDER, FOR SOLUTION INTRAVENOUS at 09:19

## 2024-03-26 RX ADMIN — Medication 1 TABLET: at 09:20

## 2024-03-26 RX ADMIN — SODIUM CHLORIDE, PRESERVATIVE FREE 10 ML: 5 INJECTION INTRAVENOUS at 09:20

## 2024-03-26 RX ADMIN — ATORVASTATIN CALCIUM 20 MG: 10 TABLET, FILM COATED ORAL at 09:20

## 2024-03-26 RX ADMIN — SODIUM CHLORIDE, PRESERVATIVE FREE 10 ML: 5 INJECTION INTRAVENOUS at 20:16

## 2024-03-26 ASSESSMENT — PAIN SCALES - GENERAL
PAINLEVEL_OUTOF10: 0
PAINLEVEL_OUTOF10: 0

## 2024-03-26 NOTE — PROGRESS NOTES
Physical Therapy  Facility/Department: [facility] A2 CARD TELEMETRY  Physical Therapy Initial Assessment    Name: Ron Chirinos  : 1940  MRN: [id number]  Date of Service: 3/26/2024    Discharge Recommendations:  24 hour supervision or assist, Home with Home health PT (initial 24-hr sup/assist for safety)   PT Equipment Recommendations  Equipment Needed: No  Other: Pt has standing RW available for home use      Patient Diagnosis(es): The primary encounter diagnosis was Melena. Diagnoses of Acute blood loss anemia, Chronic kidney disease, unspecified CKD stage, and Anemia, unspecified type were also pertinent to this visit.  Past Medical History:  has a past medical history of CAD (coronary artery disease), Diabetes mellitus (HCC), Hypertension, and Unspecified cerebral artery occlusion with cerebral infarction.  Past Surgical History:  has a past surgical history that includes Coronary angioplasty with stent; Cardiac surgery (); hernia repair; eye surgery (Left, 2016); Cardiac surgery (N/A, 2023); Cardiac surgery (N/A, 2023); Upper gastrointestinal endoscopy (N/A, 3/22/2024); Upper gastrointestinal endoscopy (N/A, 3/25/2024); and Upper gastrointestinal endoscopy (3/25/2024).    Assessment   Body Structures, Functions, Activity Limitations Requiring Skilled Therapeutic Intervention: Decreased functional mobility ;Decreased strength;Decreased balance;Decreased posture  Assessment: Pt referred for PT evaluation during current hospital stay with dx of melena 2* hiatal hernia and esophagitis.  Pt also voicing increased c/o weakness over the last several days.  Pt currently functioning slightly below baseline, requiring CGA/close SBA x 1 with support of RW during transfer/gait activities.  Pt remarks feeling somewhat unsteady when ambulating at baseline, holding onto furniture frequently when ambulating around his home.  Therapist educated pt on benefits of using walker as needed to help with  Frame for Short Term Goals: 1 week, 4/02/24 (unless otherwise specified)  Short Term Goal 1: Pt will transfer supine <-> sit with (I)  Short Term Goal 2: Pt will transfer sit <-> stand and bed>chair using least AD with supervision  Short Term Goal 3: Pt will ambulate x 125 feet using least AD (likely RW) with supervision  Short Term Goal 4: By 3/29/24: Pt will tolerate 12-15 reps BLE exercise for strengthening and balance  Patient Goals   Patient Goals : \"To get stronger & go home as soon as they'll let me\"       Education  Patient Education  Education Given To: Patient  Education Provided: Role of Therapy;Plan of Care;Precautions;Transfer Training;Equipment;Fall Prevention Strategies  Education Method: Demonstration;Verbal  Barriers to Learning: None  Education Outcome: Verbalized understanding;Demonstrated understanding;Continued education needed      Therapy Time   Individual Concurrent Group Co-treatment   Time In 1115         Time Out 1210         Minutes 55         Timed Code Treatment Minutes: 45 Minutes (10 minute eval + 45 minutes treatment)       Dee Dee Howard, PT, DPT #[id number]    If pt is unable to be seen after this session, please let this note serve as discharge summary.  Please see case management note for discharge disposition.  Thank you.

## 2024-03-26 NOTE — PROGRESS NOTES
regional wall motion abnormalities.  Diastolic dysfunction noted.  - Repeat TTE on 03/25/24-  LV systolic functio normal with EF >55%. LV normal in size with mild concentric LVH. Moderate thickening of posterior and anterior leaflet of mitral   - Typically on lisinopril, Coreg    Troponin elevation -   [x] Trop leak 2nd to CKD/ESRD  [x] Demand ischemia w/out MI     Physical Exam Performed:      General appearance:  No apparent distress  Respiratory:  Normal respiratory effort.   Cardiovascular:  Regular rate and rhythm.  Abdomen:  Soft, non-tender, non-distended.  Musculoskelatal:  No edema  Neurologic:  Non-focal  Psychiatric:  Alert and oriented    /62   Pulse 63   Temp 98.2 °F (36.8 °C) (Oral)   Resp 16   Ht 1.854 m (6' 1\")   Wt 62.8 kg (138 lb 6.4 oz)   SpO2 99%   BMI 18.26 kg/m²     Diet: ADULT DIET; Dysphagia - Soft and Bite Sized    DVT Prophylaxis: []PPx LMWH  []SQ Heparin  [x]IPC/SCDs  []Eliquis  []Xarelto  []Coumadin  []Other -      Code status: Full Code    PT/OT Eval Status:   [x]NOT yet ordered  []Ordered and Pending   []Seen with Recommendations for:  []Home independently  []Home w/ assist  []HHC  []SNF  []Acute Rehab    Anticipated Discharge Day/Date: Pending further GI evaluation, suspect 1 to 2 days    Anticipated Discharge Location: [x]Home  [x]HHC  []SNF  []Acute Rehab  []ECF  []LTAC  []Hospice  []Other -      Consults:      IP CONSULT TO GI  IP CONSULT TO CARDIOLOGY  IP CONSULT TO CARDIOLOGY      This patient has a high likelihood of being discharged tomorrow and is appropriate for A1 Discharge Unit in AM pending clinical course overnight: []Yes  [x]No    ------------------------------------------------------------------------------------------------------------------------------------------------------------------------    MDM  (this section is simply meant as an aid to accurate billing and does not necessarily reflect ongoing patient care which is documented elsewhere in the  medical record)    [x] High (any 2)    A. Problems (any 1)  [x] Acute/Chronic Illness/injury posing threat to life or bodily function: GI bleed  [] Severe exacerbation of chronic illness:    ---------------------------------------------------------------------  B. Risk of Treatment (any 1)   [x] Drugs/treatments that require intensive monitoring for toxicity include:    [] IV ABX requiring serial renal monitoring for nephrotoxicity:     [] Post-Cath/Contrast study requiring serial renal monitoring for Contrast Induced Nephropathy  [] IV Narcotic analgesia for ADR   [] Aggressive IV diuresis requiring serial monitoring for renal impairment and electrolyte derangements  [] Hypertonic Saline requiring serial renal monitoring for appropriate electrolyte correction rate   [] Critical electrolyte abnormalities requiring IV replacement and close serial monitoring  [] Insulin - monitoring FSBS for Hypoglycemic ADR  [] Anticoagulation requiring close serial monitoring and dose adjustments at high risk of ADR   [] HD requiring close serial monitoring of electrolytes and fluid status  [x] Other -GIB requiring frequent monitoring of H&H and possible blood transfusion  [] Change in code status:    [] Decision to escalate care:    [] Major surgery/procedure with associated risk factors:    ----------------------------------------------------------------------  C. Data (any 2)  [x] Discussed management of the case with consultants as follows: Cards  [x] Discussed the discharge plan in detail with case mgt including timing/barriers to discharge, need for support services and placement decision   [] Imaging personally reviewed and interpreted, includes:   [] Telemetry monitoring as noted above  [x] Data Review (any 3)  [x] Collateral history obtained from: Patient, review of EMR  [x] All available Consultant notes from yesterday/today were reviewed  [x] All current labs were reviewed and interpreted for clinical significance   [x]

## 2024-03-26 NOTE — PLAN OF CARE
Problem: Chronic Conditions and Co-morbidities  Goal: Patient's chronic conditions and co-morbidity symptoms are monitored and maintained or improved  Outcome: Progressing  Note: Patient with diagnosis of Diabetes. Active orders for ACHS glucose monitoring, SSI and Lantus. Instructed importance of following carb control diet to maintain stable gluocose levels.       Problem: Pain  Goal: Verbalizes/displays adequate comfort level or baseline comfort level  Outcome: Progressing     Problem: Safety - Adult  Goal: Free from fall injury  Outcome: Progressing     Problem: Skin/Tissue Integrity  Goal: Absence of new skin breakdown  Description: 1.  Monitor for areas of redness and/or skin breakdown  2.  Assess vascular access sites hourly  3.  Every 4-6 hours minimum:  Change oxygen saturation probe site  4.  Every 4-6 hours:  If on nasal continuous positive airway pressure, respiratory therapy assess nares and determine need for appliance change or resting period.  Outcome: Progressing     CHF Care Plan      Patient's EF (Ejection Fraction) is greater than 40%    Heart Failure Medications:  Diuretics:: None    (One of the following REQUIRED for EF </= 40%/SYSTOLIC FAILURE but MAY be used in EF% >40%/DIASTOLIC FAILURE)        ACE:: None        ARB:: None         ARNI:: None    (Beta Blockers)  NON- Evidenced Based Beta Blocker (for EF% >40%/DIASTOLIC FAILURE): None    Evidenced Based Beta Blocker::(REQUIRED for EF% <40%/SYSTOLIC FAILURE) None  ...................................................................................................................................................    Failed to redirect to the Timeline version of the [employer] SmartLink.      Patient's weights and intake/output reviewed    Daily Weight log at bedside, patient/family participation in use of log: \"yes    Patient's current weight today shows a difference of 3 lbs less than last documented weight.      Intake/Output Summary (Last 24

## 2024-03-26 NOTE — PROGRESS NOTES
Progress Note    Patient Ron Chirinos  MRN: [id number]  YOB: 1940 Age: 83 y.o. Sex: male  Room: [address] [place] [address]       Admitting Physician: Don Malone MD   Date of Admission: 3/22/2024 10:31 AM   Primary Care Physician: Marlo Pichardo MD     Subjective:  Ron Chirinos was seen and examined. We are following for Anemia.  -- no significant events overnight  -- Feeling well  -- No Black/bloody stools  -- Hgb 7.8 (7.5 overnight)    ROS:  Constitutional: Denies fever, no change in appetite  Respiratory: Denies cough or shortness of breath  Cardiovascular: Denies chest pain or edema    Objective:  Vital Signs:   Vitals:    03/26/24 0815   BP: 127/73   Pulse: 78   Resp: 16   Temp: 98.2 °F (36.8 °C)   SpO2: 100%         Physical Exam:  Constitutional: Alert and oriented x 4. No acute distress.   HEENT: Sclera anicteric, mucosal membranes moist  Cardiovascular: Regular rate and rhythm.  No murmurs.  Respiratory: Respirations nonlabored, no crepitus  GI: Abdomen nondistended, soft, and nontender.  Normal active bowel sounds.  No masses palpable.   Rectal: Deferred  Musculoskeletal:  No pitting edema of the lower legs.  Neurological: Gross memory appears intact.  No Asterixis    Intake/Output:    Intake/Output Summary (Last 24 hours) at 3/26/2024 0837  Last data filed at 3/26/2024 0816  Gross per 24 hour   Intake 2451.36 ml   Output 2025 ml   Net 426.36 ml        Current Medications:  Current Facility-Administered Medications   Medication Dose Route Frequency Provider Last Rate Last Admin    pantoprazole (PROTONIX) injection 40 mg  40 mg IntraVENous Daily Concepcion Garcia APRN        0.9 % sodium chloride infusion   IntraVENous PRN Concepcion Garcia APRNICO        benzocaine-menthol (CEPACOL SORE THROAT) lozenge 1 lozenge  1 lozenge Oral Q2H PRN Concepcion Garcia APRN   1 lozenge at 03/25/24 2101    atorvastatin (LIPITOR) tablet 20 mg  20 mg Oral Daily Concepcion Garcia APRN   20 mg at 03/25/24 0817     therapeutic multivitamin-minerals 1 tablet  1 tablet Oral Daily Jose Concepcion Pretty, APRN   1 tablet at 03/25/24 0817    0.9 % sodium chloride infusion   IntraVENous PRN Capo Ayala MD        sodium chloride flush 0.9 % injection 5-40 mL  5-40 mL IntraVENous 2 times per day Sha Hatfield APRN - CNP   10 mL at 03/25/24 2101    sodium chloride flush 0.9 % injection 5-40 mL  5-40 mL IntraVENous PRN Sha Hatfield APRN - CNP        0.9 % sodium chloride infusion   IntraVENous PRN Sha Hatfield APRN - GEORGE        ondansetron (ZOFRAN-ODT) disintegrating tablet 4 mg  4 mg Oral Q8H PRN Sha Hatfield APRN - CNP        Or    ondansetron (ZOFRAN) injection 4 mg  4 mg IntraVENous Q6H PRN Sha Hatfield APRN - GEORGE        acetaminophen (TYLENOL) tablet 650 mg  650 mg Oral Q6H PRN Sha Hatfield APRN - CNP        Or    acetaminophen (TYLENOL) suppository 650 mg  650 mg Rectal Q6H PRN Sha Hatfield APRN - CNP        glucose chewable tablet 16 g  4 tablet Oral PRN Sha Hatfeild APRN - CNP        dextrose bolus 10% 125 mL  125 mL IntraVENous PRN Sha Hatfield APRN - CNP        Or    dextrose bolus 10% 250 mL  250 mL IntraVENous PRN Sha Hatfield APRN - CNP        glucagon injection 1 mg  1 mg SubCUTAneous PRN Sha Hatfield APRN - CNP        dextrose 10 % infusion   IntraVENous Continuous PRN Sha Hatfield APRN - CNP        insulin lispro (HUMALOG) injection vial 0-4 Units  0-4 Units SubCUTAneous Q4H Sha Hatfield APRN - CNP        0.9 % sodium chloride infusion   IntraVENous PRN Kristen Curiel SCARL - GEORGE             Recent Imaging:   XR CHEST PORTABLE  Narrative: EXAMINATION:  ONE XRAY VIEW OF THE CHEST    3/22/2024 10:54 am    COMPARISON:  CXR dated 04/13/2023    HISTORY:  ORDERING SYSTEM PROVIDED HISTORY: chest pain  TECHNOLOGIST PROVIDED HISTORY:  Reason for exam:->chest pain  Reason for Exam: cp    FINDINGS:  Mediastinum/Heart: Sternotomy wires.  TAVR stent.  Surgical clips over the  heart.  The heart appears normal in size.    Lungs: The lungs

## 2024-03-26 NOTE — CARE COORDINATION
Spoke with patient at bedside for introduction.   He lives home alone and was independent prior to admission.  Discussed therapy rec's of home care but he states he is not interested in home care and does not feel as though he needs it.  He states he has family that can assist him at discharge if needed.   Encouraged him to please notify CM should he change his mind.  He verbalized understanding.

## 2024-03-26 NOTE — PROGRESS NOTES
Occupational Therapy  Facility/Department: [facility] A2 CARD TELEMETRY  Occupational Therapy Initial Assessment and Treatment    Name: Ron Chirinos  : 1940  MRN: [id number]  Date of Service: 3/26/2024    Discharge Recommendations:  24 hour supervision or assist, Home with Home health OT  OT Equipment Recommendations  Equipment Needed: No     If pt is unable to be seen after this session, please let this note serve as discharge summary.  Please see case management note for discharge disposition.  Thank you.    Patient Diagnosis(es): The primary encounter diagnosis was Melena. Diagnoses of Acute blood loss anemia, Chronic kidney disease, unspecified CKD stage, and Anemia, unspecified type were also pertinent to this visit.  Past Medical History:  has a past medical history of CAD (coronary artery disease), Diabetes mellitus (HCC), Hypertension, and Unspecified cerebral artery occlusion with cerebral infarction.  Past Surgical History:  has a past surgical history that includes Coronary angioplasty with stent; Cardiac surgery (); hernia repair; eye surgery (Left, 2016); Cardiac surgery (N/A, 2023); Cardiac surgery (N/A, 2023); Upper gastrointestinal endoscopy (N/A, 3/22/2024); Upper gastrointestinal endoscopy (N/A, 3/25/2024); and Upper gastrointestinal endoscopy (3/25/2024).           Assessment   Performance deficits / Impairments: Decreased functional mobility ;Decreased endurance;Decreased ADL status;Decreased posture;Decreased balance;Decreased strength;Decreased safe awareness  Assessment: Pt is 82 yo male presenting from home alone w/ GI bleed. PLOF IND with all ADLs and mobility w/ standing walker. Pt is functioning below baseline this date, requiring SBA for bed mobility and CGA for transfers and mobility w/ RW. He required CGA for LB dressing and toileting tasks, and SBA for standing grooming tasks. Pt is limited by weakness, fatigue, balance, endurance, safety awareness, and  bout)  Interventions: Safety awareness training;Verbal cues;Tactile cues  Base of Support: Narrowed;Center of gravity altered (center of gravity shifted slightly forward 2* pt's kyphotic posture & increased forward trunk flexion)  Speed/Yesy: Pace decreased (< 100 feet/min);Slow;Shuffled  Step Length: Left shortened;Right shortened  Gait Abnormalities: Decreased step clearance;Shuffling gait (increased forward trunk flexion, mild unsteadiness when amb without AD, balance improves & stride length increases slightly when using RW)     AROM: Generally decreased, functional  Strength: Generally decreased, functional  Coordination: Generally decreased, functional  Tone: Normal  Sensation: Intact  ADL  Feeding: Independent;Beverage management  Grooming: Stand by assistance  Grooming Skilled Clinical Factors: standing at sink w/ RW to perform hand hygiene  Toileting: Contact guard assistance  Toileting Skilled Clinical Factors: CGA for clothing management in standing, seated on toilet for bowel hgyiene  Functional Mobility: Contact guard assistance  Functional Mobility Skilled Clinical Factors: bathroom>chair     Activity Tolerance  Activity Tolerance: Patient tolerated evaluation without incident;Patient tolerated treatment well        Vision  Vision: Within Functional Limits  Hearing  Hearing: Within functional limits  Cognition  Overall Cognitive Status: WFL  Orientation  Overall Orientation Status: Within Functional Limits  Orientation Level: Oriented X4                  Education Given To: Patient  Education Provided: Role of Therapy;Plan of Care;Precautions;ADL Adaptive Strategies;Transfer Training;Energy Conservation  Education Method: Demonstration;Verbal  Barriers to Learning: Hearing  Education Outcome: Verbalized understanding;Continued education needed  LUE AROM (degrees)  LUE AROM : WFL  Left Hand AROM (degrees)  Left Hand AROM: WFL  RUE AROM (degrees)  RUE AROM : WFL  Right Hand AROM (degrees)  Right Hand

## 2024-03-26 NOTE — PROGRESS NOTES
[facility]   Daily Progress Note      Admit Date:  3/22/2024    Reason for follow up visit: Chest pain; elevated troponin    CC: \"I feel so much better. I haven't had any more chest pain.\"    83 y.o. man with PMH notable for CAD s/p CABG, AS s/p TAVR, h/o CVA, HTN, HLP, and DM admitted for melena and acute blood loss anemia with presenting Hgb of 8. Noted worsening weakness, fatigue and SOB. Recently stopped Eliquis. Seen by GI who recommend EGD and IV PPI gtt. Noted to have elevated troponin and cardiology consulted. EGD preformed on 3/25/24 demonstrated grade C esophagitis, hiatal hernia and Dieulafoy's ulcer in the fundus. Placed on PPI.    Interval History:  Pt. seen and examined; records reviewed  BP stable. Remains on room air  Denies chest pain, SOB, cough, palpitations, dizziness  Anxious to go home  Hgb 7.8 today    Subjective:  Pt with no acute overnight cardiac events.   A 12 point review of systems was completed. Pertinent positives were identified in the HPI/Interval history. All other review of symptoms negative unless otherwise noted below.    Objective:   /73   Pulse 78   Temp 98.2 °F (36.8 °C) (Oral)   Resp 16   Ht 1.854 m (6' 1\")   Wt 62.8 kg (138 lb 6.4 oz)   SpO2 100%   BMI 18.26 kg/m²     Intake/Output Summary (Last 24 hours) at 3/26/2024 0844  Last data filed at 3/26/2024 0816  Gross per 24 hour   Intake 2451.36 ml   Output 2025 ml   Net 426.36 ml     Wt Readings from Last 3 Encounters:   03/26/24 62.8 kg (138 lb 6.4 oz)   01/05/24 67.3 kg (148 lb 6.4 oz)   11/13/23 65 kg (143 lb 6.4 oz)       Physical Exam:  General: In no acute distress. Awake, alert, and oriented X4. Loquacious  Skin:  Warm and dry.   Neck:  Supple. No JVD appreciated.  Chest: Lungs clear to auscultation. No wheezes/rhonchi/rales  Cardiovascular:  irreg, irreg; I/VI systolic murmur  Abdomen:  soft, nontender, nondistended, +bowel sounds.   Extremities:  No LE edema. 2+ bilateral radial/DP pulses. Cap  retrograde filling to the mid-LAD.  C. SVG to diagonal:  The saphenous vein graft is patent with 30% proximal and another 30% stenosis in the mid-segment.  The diagonal artery bifurcates after the anastomosis and has minor luminal irregularities.  D. SVG to OM: The saphenous vein graft has a very angulated take-off from the aorta and has a 70-80% stenosis in the mid-segment.  The native OM is a relatively small caliber vessel that bifurcates after the anastomosis and has mild-moderate disease.    E. SVG to RCA: The saphenous vein graft is patent with a 40% ostial stenosis, 40% proximal stenosis, and another 30% stenosis in the mid-segment.  The RPDA beyond the anastomosis has minor luminal irregularities.  There is retrograde filling of the RPLB. There are right-to-left collaterals to an OM branch.     Impression:  1. Severe native multivessel coronary artery disease with  of ostial left main and  of mid-RCA.  2. Patent LIMA to LAD.  3. Patent SVG to diagonal.  4. Severely disease SVG to OM.  5. Patent SVG to RPDA.  6. Normal left-sided cardiac filling pressures.  7. Mildly elevated right-sided cardiac filling pressures.  8. Mild-moderate pulmonary hypertension.  9. Preserved Luther cardiac output and index.  10. Severe aortic stenosis by echo.    Telemetry: Atrial fib with occasional PVC; VR controlled  (Personally reviewed)    Assessment/Plan:  1) Elevated troponin  -likely d/t demand ischemia with acute blood loss anemia  -continue statin  -no evidence of an ACS/NSTEMI  -echo with LVEF 55% and no wall motion abnormalities  -no recurrent chest pain; continue medical management    2) Acute blood loss anemia  -on PPI gtt per GI  -melena  -maintain Hgb > 8  -S/P endoscopy on 3/25/24     3) Chronic atrial fib  -VR controlled  -not on OAC d/t GI bleed     4) SAE on CKD   -Cr 1.4    5) Coronary artery disease  -prior CABG  -angiogram in April 2023 with severe multivessel CAD with  of ostial left main and  of

## 2024-03-27 VITALS
WEIGHT: 139.1 LBS | OXYGEN SATURATION: 100 % | DIASTOLIC BLOOD PRESSURE: 75 MMHG | HEART RATE: 84 BPM | RESPIRATION RATE: 15 BRPM | BODY MASS INDEX: 18.43 KG/M2 | SYSTOLIC BLOOD PRESSURE: 120 MMHG | HEIGHT: 73 IN | TEMPERATURE: 97.9 F

## 2024-03-27 LAB
ACANTHOCYTES BLD QL SMEAR: ABNORMAL
ANION GAP SERPL CALCULATED.3IONS-SCNC: 7 MMOL/L (ref 3–16)
BASOPHILS # BLD: 0 K/UL (ref 0–0.2)
BASOPHILS NFR BLD: 0.6 %
BUN SERPL-MCNC: 31 MG/DL (ref 7–20)
CALCIUM SERPL-MCNC: 8.6 MG/DL (ref 8.3–10.6)
CHLORIDE SERPL-SCNC: 106 MMOL/L (ref 99–110)
CO2 SERPL-SCNC: 23 MMOL/L (ref 21–32)
CREAT SERPL-MCNC: 1.4 MG/DL (ref 0.8–1.3)
DACRYOCYTES BLD QL SMEAR: ABNORMAL
DEPRECATED RDW RBC AUTO: 15 % (ref 12.4–15.4)
EOSINOPHIL # BLD: 0.3 K/UL (ref 0–0.6)
EOSINOPHIL NFR BLD: 4.7 %
GFR SERPLBLD CREATININE-BSD FMLA CKD-EPI: 49 ML/MIN/{1.73_M2}
GLUCOSE BLD-MCNC: 113 MG/DL (ref 70–99)
GLUCOSE BLD-MCNC: 123 MG/DL (ref 70–99)
GLUCOSE BLD-MCNC: 127 MG/DL (ref 70–99)
GLUCOSE BLD-MCNC: 132 MG/DL (ref 70–99)
GLUCOSE SERPL-MCNC: 101 MG/DL (ref 70–99)
HCT VFR BLD AUTO: 20.5 % (ref 40.5–52.5)
HCT VFR BLD AUTO: 23.3 % (ref 40.5–52.5)
HGB BLD-MCNC: 7 G/DL (ref 13.5–17.5)
HGB BLD-MCNC: 7.8 G/DL (ref 13.5–17.5)
LYMPHOCYTES # BLD: 1.5 K/UL (ref 1–5.1)
LYMPHOCYTES NFR BLD: 26.4 %
MCH RBC QN AUTO: 31.8 PG (ref 26–34)
MCHC RBC AUTO-ENTMCNC: 34.3 G/DL (ref 31–36)
MCV RBC AUTO: 92.5 FL (ref 80–100)
MONOCYTES # BLD: 0.7 K/UL (ref 0–1.3)
MONOCYTES NFR BLD: 13 %
NEUTROPHILS # BLD: 3.1 K/UL (ref 1.7–7.7)
NEUTROPHILS NFR BLD: 55.3 %
PATH INTERP BLD-IMP: NORMAL
PATH INTERP BLD-IMP: YES
PERFORMED ON: ABNORMAL
PLATELET # BLD AUTO: 113 K/UL (ref 135–450)
PLATELET BLD QL SMEAR: ABNORMAL
PMV BLD AUTO: 8.6 FL (ref 5–10.5)
POLYCHROMASIA BLD QL SMEAR: ABNORMAL
POTASSIUM SERPL-SCNC: 3.7 MMOL/L (ref 3.5–5.1)
RBC # BLD AUTO: 2.21 M/UL (ref 4.2–5.9)
ROULEAUX BLD QL SMEAR: ABNORMAL
SLIDE REVIEW: ABNORMAL
SODIUM SERPL-SCNC: 136 MMOL/L (ref 136–145)
WBC # BLD AUTO: 5.5 K/UL (ref 4–11)

## 2024-03-27 PROCEDURE — 97535 SELF CARE MNGMENT TRAINING: CPT

## 2024-03-27 PROCEDURE — 80048 BASIC METABOLIC PNL TOTAL CA: CPT

## 2024-03-27 PROCEDURE — 97530 THERAPEUTIC ACTIVITIES: CPT

## 2024-03-27 PROCEDURE — 2580000003 HC RX 258: Performed by: NURSE PRACTITIONER

## 2024-03-27 PROCEDURE — 6360000002 HC RX W HCPCS: Performed by: NURSE PRACTITIONER

## 2024-03-27 PROCEDURE — 85025 COMPLETE CBC W/AUTO DIFF WBC: CPT

## 2024-03-27 PROCEDURE — 85014 HEMATOCRIT: CPT

## 2024-03-27 PROCEDURE — 6370000000 HC RX 637 (ALT 250 FOR IP): Performed by: NURSE PRACTITIONER

## 2024-03-27 PROCEDURE — C9113 INJ PANTOPRAZOLE SODIUM, VIA: HCPCS | Performed by: NURSE PRACTITIONER

## 2024-03-27 PROCEDURE — 85018 HEMOGLOBIN: CPT

## 2024-03-27 RX ORDER — PANTOPRAZOLE SODIUM 40 MG/1
40 TABLET, DELAYED RELEASE ORAL
Qty: 60 TABLET | Refills: 1 | Status: SHIPPED | OUTPATIENT
Start: 2024-03-27

## 2024-03-27 RX ADMIN — Medication 1 TABLET: at 09:32

## 2024-03-27 RX ADMIN — ATORVASTATIN CALCIUM 20 MG: 10 TABLET, FILM COATED ORAL at 09:33

## 2024-03-27 RX ADMIN — PANTOPRAZOLE SODIUM 40 MG: 40 INJECTION, POWDER, FOR SOLUTION INTRAVENOUS at 09:33

## 2024-03-27 RX ADMIN — SODIUM CHLORIDE, PRESERVATIVE FREE 10 ML: 5 INJECTION INTRAVENOUS at 09:33
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
## 2024-03-27 NOTE — PLAN OF CARE

## 2024-03-27 NOTE — PROGRESS NOTES
0920    therapeutic multivitamin-minerals 1 tablet  1 tablet Oral Daily JoseConcepcion Pretty, APRN   1 tablet at 03/26/24 0920    0.9 % sodium chloride infusion   IntraVENous PRN Capo Ayala MD        sodium chloride flush 0.9 % injection 5-40 mL  5-40 mL IntraVENous 2 times per day Sha Hatfield APRN - CNP   10 mL at 03/26/24 2016    sodium chloride flush 0.9 % injection 5-40 mL  5-40 mL IntraVENous PRN Sha Hatfield APRN - CNP        0.9 % sodium chloride infusion   IntraVENous PRN Sha Hatfield APRN - CNP        ondansetron (ZOFRAN-ODT) disintegrating tablet 4 mg  4 mg Oral Q8H PRN Sha Hatfield APRN - CNP        Or    ondansetron (ZOFRAN) injection 4 mg  4 mg IntraVENous Q6H PRN Sha Hatfield APRN - GEORGE        acetaminophen (TYLENOL) tablet 650 mg  650 mg Oral Q6H PRN Sha Hatfield APRN - CNP        Or    acetaminophen (TYLENOL) suppository 650 mg  650 mg Rectal Q6H PRN Sha Hatfield APRN - CNP        glucose chewable tablet 16 g  4 tablet Oral PRN Sha Hatfield APRN - CNP        dextrose bolus 10% 125 mL  125 mL IntraVENous PRN Sha Hatfield APRN - CNP        Or    dextrose bolus 10% 250 mL  250 mL IntraVENous PRN Sha Hatfield APRN - CNP        glucagon injection 1 mg  1 mg SubCUTAneous PRN Sha Hatfield APRN - CNP        dextrose 10 % infusion   IntraVENous Continuous PRN Sha Hatfield APRN - CNP        insulin lispro (HUMALOG) injection vial 0-4 Units  0-4 Units SubCUTAneous Q4H Sha Hatfield APRN - CNP        0.9 % sodium chloride infusion   IntraVENous PRN Kristen Curiel SCARL - GEORGE             Recent Imaging:   XR CHEST PORTABLE  Narrative: EXAMINATION:  ONE XRAY VIEW OF THE CHEST    3/22/2024 10:54 am    COMPARISON:  CXR dated 04/13/2023    HISTORY:  ORDERING SYSTEM PROVIDED HISTORY: chest pain  TECHNOLOGIST PROVIDED HISTORY:  Reason for exam:->chest pain  Reason for Exam: cp    FINDINGS:  Mediastinum/Heart: Sternotomy wires.  TAVR stent.  Surgical clips over the  heart.  The heart appears normal in size.    Lungs: The

## 2024-03-27 NOTE — DISCHARGE SUMMARY
Hospital Medicine Discharge Summary    Patient: Ron Chirinos   : 1940     Admit Date: 3/22/2024   Discharge Date: 3/27/2024   Disposition:  [x]Home   []HHC  []SNF  []ECF  []Acute Rehab  []LTAC  []Hospice  Code status:  [x]Full  []DNR/CCA  []Limited (DNR/CCA with Do Not Intubate)  []DNRCC  Condition at Discharge: Stable  Primary Care Provider: Marlo Pichardo MD    Admitting Provider: Don Malone MD  Discharge Provider: CARL Finney     Discharge Diagnoses:      Active Hospital Problems    Diagnosis     GIB (gastrointestinal bleeding) [K92.2]      Presenting Admission History:       83 y.o. male with a PMH of CAD s/p CABG,HLD, DM, HTN, AVS s/p TAVR, and CVA who presented to [facility] with a complaint of episode of black loose stool this am. He reports in usual state of health until this morning. He denies N/V/F/C or abd pain. Patient lives independently. No hx of gib, he is on aspirin 81mg daily, recently stopped Eliquis.     In ED, patient a/o, pale, hgb 7.0, +fobt, ecg afib, BUN 94/scr 1.6, GI consulted in ED, Trop 91-91, CXR stable. PPI gtt started. 1 L bolus adm.      Assessment/Plan:       Current Principal Problem:  GIB (gastrointestinal bleeding)     GI bleed due to LA grade C esophagitis  - +FOBT, BUN 94 on admission  - GI consulted-s/p EGD on 3/22/2024 with Dr. Ojeda which noted LA grade C esophagitis, hiatal hernia, and old blood in the stomach.  - Repeat EGD on 24 with Dr. Cash with LA grade C esophagitis, hiatal hernia, probable Dieulafoy's ulcer in the fundus with visible vessel  - PPI daily  - Repeat H/H this afternoon stable at 7.8/23.3.  Discussed case with GI team, okay for discharge and okay to resume home baby aspirin     HTN-continue home meds     HLD with history of CAD-resumed home statin.  Baby aspirin continued.  Cardiology consulted, appreciate their recommendations.     Chronic diastolic heart failure. Hx of AS S/P TAVR  - TTE on 2023 with LV   --    HGB 7.1*   < > 7.8* 7.5* 7.0* 7.8*   HCT 20.9*   < > 22.9* 22.0* 20.5* 23.3*   PLT 82*  --  114*  --  113*  --     < > = values in this interval not displayed.     Recent Labs     03/25/24  0525 03/26/24  0506 03/27/24  0449    137 136   K 3.6 3.4* 3.7    103 106   CO2 21 22 23   BUN 66* 39* 31*   CREATININE 1.6* 1.4* 1.4*   CALCIUM 8.3 8.7 8.6   MG  --  2.10  --      No results for input(s): \"PROBNP\", \"TROPHS\" in the last 72 hours.  No results for input(s): \"LABA1C\" in the last 72 hours.  No results for input(s): \"AST\", \"ALT\", \"BILIDIR\", \"BILITOT\", \"ALKPHOS\" in the last 72 hours.  No results for input(s): \"INR\", \"LACTA\", \"TSH\" in the last 72 hours.    Urine Cultures:   Lab Results   Component Value Date/Time    LABURIN No growth at 18 to 36 hours 08/17/2023 11:08 AM     Blood Cultures:   Lab Results   Component Value Date/Time    BC No Growth after 4 days of incubation. 03/22/2024 10:36 AM     Lab Results   Component Value Date/Time    BLOODCULT2 No Growth after 4 days of incubation. 03/22/2024 11:49 AM     Organism: No results found for: \"ORG\"    Signed:    Concepcion Garcia, CARL

## 2024-03-27 NOTE — PROGRESS NOTES
Pt d/c'd home via private vehicle.  Removed peripheral IV and stopped bleeding.  Catheter intact. Pt tolerated well. No redness noted at site.  Notified CMU and removed tele box. Reviewed d/c instructions, home meds, and  f/u information utilizing teach-back method.  Scripts sent to patient's home pharmacy. Patient verbalized understanding.

## 2024-04-23 ENCOUNTER — OFFICE VISIT (OUTPATIENT)
Dept: CARDIOLOGY CLINIC | Age: 84
End: 2024-04-23
Payer: MEDICARE

## 2024-04-23 VITALS
OXYGEN SATURATION: 99 % | BODY MASS INDEX: 19.83 KG/M2 | HEART RATE: 62 BPM | SYSTOLIC BLOOD PRESSURE: 126 MMHG | DIASTOLIC BLOOD PRESSURE: 78 MMHG | HEIGHT: 73 IN | WEIGHT: 149.6 LBS

## 2024-04-23 DIAGNOSIS — I10 PRIMARY HYPERTENSION: ICD-10-CM

## 2024-04-23 DIAGNOSIS — I25.10 CORONARY ARTERY DISEASE INVOLVING NATIVE CORONARY ARTERY OF NATIVE HEART WITHOUT ANGINA PECTORIS: Primary | ICD-10-CM

## 2024-04-23 PROCEDURE — 1111F DSCHRG MED/CURRENT MED MERGE: CPT | Performed by: NURSE PRACTITIONER

## 2024-04-23 PROCEDURE — G8420 CALC BMI NORM PARAMETERS: HCPCS | Performed by: NURSE PRACTITIONER

## 2024-04-23 PROCEDURE — G8427 DOCREV CUR MEDS BY ELIG CLIN: HCPCS | Performed by: NURSE PRACTITIONER

## 2024-04-23 PROCEDURE — 99214 OFFICE O/P EST MOD 30 MIN: CPT | Performed by: NURSE PRACTITIONER

## 2024-04-23 PROCEDURE — 1123F ACP DISCUSS/DSCN MKR DOCD: CPT | Performed by: NURSE PRACTITIONER

## 2024-04-23 PROCEDURE — 1036F TOBACCO NON-USER: CPT | Performed by: NURSE PRACTITIONER

## 2024-04-23 RX ORDER — ATORVASTATIN CALCIUM 20 MG/1
20 TABLET, FILM COATED ORAL DAILY
Qty: 90 TABLET | Refills: 3 | Status: SHIPPED | OUTPATIENT
Start: 2024-04-23

## 2024-04-23 NOTE — PROGRESS NOTES
[facility]   Cardiology Note              Date:  April 23, 2024  Patientname: Ron Chirinos  YOB: 1940    Primary Care physician: Marlo Pichardo MD    HISTORY OF PRESENT ILLNESS: Ron Chirinos is a 84 y.o. male with a history of CAD s/p CABG, aortic stenosis s/p TAVR, atrial fibrillation, HTN, HLD, DM.  He has a history of CABG 2005.  Echo 2/2023 showed EF 60-65%, moderate-severe AI, severe AS.  LHC 4/2023 showed severe native CAD, patent LIMA-LAD and SVG-diagonal, severely diseased SVG-OM, consider staged PCI.  He had TAVR 8/2023.  Cardiac monitor 12/2023 showed sinus rhythm, no atrial fibrillation, nocturnal block.  He was admitted 3/2024 for melena and anemia.  Echo showed EF > 55%, mild-moderate mitral stenosis.  EGD showed esophagitis, hiatal hernia, Dieulafoy's ulcer.    Today he presents for hospital follow-up for anemia, elevated troponin, CAD, AS.  Overall he feels well and denies any chest pain.  He has had no shortness of breath since TAVR.  He denies any dizziness, palpitations, syncope.      Office weight 4/24/2024: 149 lbs  Office weight 1/2024: 148 lbs    Cardiologist: Dr. Mitchell    Past Medical History:   has a past medical history of CAD (coronary artery disease), Diabetes mellitus (HCC), Hypertension, and Unspecified cerebral artery occlusion with cerebral infarction.    Past Surgical History:   has a past surgical history that includes Coronary angioplasty with stent; Cardiac surgery (2002); hernia repair; eye surgery (Left, 11/17/2016); Cardiac surgery (N/A, 8/29/2023); Cardiac surgery (N/A, 8/29/2023); Upper gastrointestinal endoscopy (N/A, 3/22/2024); Upper gastrointestinal endoscopy (N/A, 3/25/2024); and Upper gastrointestinal endoscopy (3/25/2024).     Home Medications:    Prior to Admission medications    Medication Sig Start Date End Date Taking? Authorizing Provider   pantoprazole (PROTONIX) 40 MG tablet Take 1 tablet by mouth every morning (before

## 2024-04-23 NOTE — PATIENT INSTRUCTIONS
Continue current medications  Check BP at home and call the office if consistently out of goal range  Stay active along with a healthy diet  Follow up as planned with Dr. Mitchell

## 2024-04-24 PROBLEM — I50.33 ACUTE ON CHRONIC DIASTOLIC (CONGESTIVE) HEART FAILURE (HCC): Status: ACTIVE | Noted: 2024-04-24

## 2024-04-24 ASSESSMENT — ENCOUNTER SYMPTOMS
RESPIRATORY NEGATIVE: 1
GASTROINTESTINAL NEGATIVE: 1

## 2024-06-19 ENCOUNTER — TELEPHONE (OUTPATIENT)
Dept: CARDIOLOGY CLINIC | Age: 84
End: 2024-06-19

## 2024-06-19 DIAGNOSIS — Z95.2 HISTORY OF TRANSCATHETER AORTIC VALVE REPLACEMENT (TAVR): Primary | ICD-10-CM

## 2024-06-19 NOTE — TELEPHONE ENCOUNTER
Frances, can you schedule pt for an echo at [place] on or close to his OV with Dr. Mitchell on 7/16/24 for his 1 year post TAVR check? (Echo can be done between 7/1/24 and 10/1/24 to meet registry guidelines). Kimberly CARO

## 2024-06-24 NOTE — TELEPHONE ENCOUNTER
Called pt, advised of needing yearly echo - offered several dates in July for AM appts - he prefers PM - therefore he is scheduled at [place] on 8-21 at 1:30 pm for TAVR Echo

## 2024-07-15 NOTE — PROGRESS NOTES
and lipitor 20mg daily.      3. Aortic valve disease: Noted severe AS and mod-severe AI on 2/23 ECHO and s/p TAVR per Dr. Feliciano in 8/23. Repeat ECHO 11/23 normal EF and TAVR functioning well. He will not need abx prophylaxis given all teeth pulled prior to TAVR. Note 3/24 ECHO during GIB admit with normal bio. AVR and EF. Recheck next March 2025.      4.     Lipids: I personally reviewed most recent labs from 4/25/23 in [facility] (see above). Well controlled and will continue lipitor 20mg daily for now. Repeat near future.       Plan:  Labs reviewed in epic and discussed with patient.  Medications reviewed in office. Medications refilled as warranted  Obtain echo in March 2025   Please obtain the following labs; -LIPID FASTING,     Follow up  9 months     Scribe's attestation:  This note was scribed in the presence of Dr. Baltazar Mitchell MD. By Alise Camacho RN      I, Dr. Baltazar Mitchell, personally performed the services described in this documentation, as scribed by the above signed scribe in my presence. It is both accurate and complete to my knowledge. I agree with the details independently gathered by the clinical support staff, while the remaining scribed note accurately describes my personal service to the patient.    Cost of prescription medications and patient compliance have been reviewed with patient. All questions answered.     Thank you for allowing me to participate in the care of this individual.    Baltazar Mitchell M.D., [facility]

## 2024-07-16 ENCOUNTER — OFFICE VISIT (OUTPATIENT)
Dept: CARDIOLOGY CLINIC | Age: 84
End: 2024-07-16
Payer: MEDICARE

## 2024-07-16 VITALS
HEIGHT: 73 IN | OXYGEN SATURATION: 100 % | SYSTOLIC BLOOD PRESSURE: 142 MMHG | WEIGHT: 149.2 LBS | HEART RATE: 56 BPM | DIASTOLIC BLOOD PRESSURE: 78 MMHG | BODY MASS INDEX: 19.78 KG/M2

## 2024-07-16 DIAGNOSIS — Z95.2 HISTORY OF TRANSCATHETER AORTIC VALVE REPLACEMENT (TAVR): ICD-10-CM

## 2024-07-16 DIAGNOSIS — E78.2 MIXED HYPERLIPIDEMIA: ICD-10-CM

## 2024-07-16 DIAGNOSIS — I35.0 SEVERE AORTIC STENOSIS: ICD-10-CM

## 2024-07-16 DIAGNOSIS — I25.10 CORONARY ARTERY DISEASE INVOLVING NATIVE CORONARY ARTERY OF NATIVE HEART WITHOUT ANGINA PECTORIS: ICD-10-CM

## 2024-07-16 DIAGNOSIS — I50.40 COMBINED SYSTOLIC AND DIASTOLIC HEART FAILURE, UNSPECIFIED HF CHRONICITY (HCC): ICD-10-CM

## 2024-07-16 DIAGNOSIS — I10 PRIMARY HYPERTENSION: Primary | ICD-10-CM

## 2024-07-16 LAB
FERRITIN: 69 NG/ML (ref 18–300)
IRON: 97
TOTAL IRON BINDING CAPACITY: 323

## 2024-07-16 PROCEDURE — 1123F ACP DISCUSS/DSCN MKR DOCD: CPT | Performed by: INTERNAL MEDICINE

## 2024-07-16 PROCEDURE — G8420 CALC BMI NORM PARAMETERS: HCPCS | Performed by: INTERNAL MEDICINE

## 2024-07-16 PROCEDURE — 1036F TOBACCO NON-USER: CPT | Performed by: INTERNAL MEDICINE

## 2024-07-16 PROCEDURE — 99214 OFFICE O/P EST MOD 30 MIN: CPT | Performed by: INTERNAL MEDICINE

## 2024-07-16 PROCEDURE — G8427 DOCREV CUR MEDS BY ELIG CLIN: HCPCS | Performed by: INTERNAL MEDICINE

## 2024-07-16 RX ORDER — LISINOPRIL 10 MG/1
10 TABLET ORAL DAILY
Qty: 90 TABLET | Refills: 3 | Status: SHIPPED | OUTPATIENT
Start: 2024-07-16

## 2024-07-16 RX ORDER — FERROUS SULFATE 325(65) MG
325 TABLET ORAL DAILY
COMMUNITY
Start: 2024-06-12

## 2024-07-16 RX ORDER — CARVEDILOL 3.12 MG/1
3.12 TABLET ORAL 2 TIMES DAILY WITH MEALS
Qty: 180 TABLET | Refills: 3 | Status: SHIPPED | OUTPATIENT
Start: 2024-07-16

## 2024-07-16 RX ORDER — FUROSEMIDE 20 MG/1
20 TABLET ORAL PRN
Qty: 30 TABLET | Refills: 5 | Status: SHIPPED | OUTPATIENT
Start: 2024-07-16

## 2024-07-16 NOTE — PATIENT INSTRUCTIONS
Labs reviewed in epic and discussed with patient.  Medications reviewed in office. Medications refilled as warranted  Obtain echo in March 2025  Please obtain the following labs; -LIPID FASTING,     Your provider has ordered testing for further evaluation.  An order/prescription has been included in your paper work.   To schedule outpatient testing, contact Central Scheduling by calling [address] [place], [place] [address]PAN ([phone]).

## 2024-07-19 ENCOUNTER — HOSPITAL ENCOUNTER (OUTPATIENT)
Age: 84
Discharge: HOME | End: 2024-07-19

## 2024-07-19 DIAGNOSIS — E78.2 MIXED HYPERLIPIDEMIA: ICD-10-CM

## 2024-07-19 LAB
CHOLEST SERPL-MCNC: 122 MG/DL (ref 0–199)
HDLC SERPL-MCNC: 58 MG/DL (ref 40–60)
LDLC SERPL CALC-MCNC: 47 MG/DL
TRIGL SERPL-MCNC: 83 MG/DL (ref 0–150)
VLDLC SERPL CALC-MCNC: 17 MG/DL

## (undated) DEVICE — CANNULA NSL L4M O2 AD FILTERLINE

## (undated) DEVICE — CANNULA NSL AD TBNG L7FT PVC STR NONFLARED PRNG O2 DEL W STD

## (undated) DEVICE — ELECTRODE,ECG,STRESS,FOAM,3PK: Brand: MEDLINE

## (undated) DEVICE — CONMED SCOPE SAVER BITE BLOCK, 20X27 MM: Brand: SCOPE SAVER

## (undated) DEVICE — FORCEPS BX L240CM DIA2.4MM L NDL RAD JAW 4 133340

## (undated) DEVICE — ELECTRODE PT RET AD L9FT HI MOIST COND ADH HYDRGEL CORDED

## (undated) DEVICE — OPTIFOAM GENTLE LIQUITRAP, SACRUM, 7"X7": Brand: MEDLINE

## (undated) DEVICE — Z DISCONTINUED NEEDLE HYPO 18GA L1.5IN THN WALL PIVOTING SHLD BVL ORIENTED

## (undated) DEVICE — FIAPC® PROBE W/ FILTER 2200 A OD 2.3MM/6.9FR; L 2.2M/7.2FT: Brand: ERBE

## (undated) DEVICE — ENDOSCOPIC KIT 2 12 FT OP4 DE2 GWN SYR

## (undated) DEVICE — GLOVE ORANGE PI 7 1/2   MSG9075